# Patient Record
Sex: FEMALE | Race: BLACK OR AFRICAN AMERICAN | HISPANIC OR LATINO | Employment: PART TIME | URBAN - METROPOLITAN AREA
[De-identification: names, ages, dates, MRNs, and addresses within clinical notes are randomized per-mention and may not be internally consistent; named-entity substitution may affect disease eponyms.]

---

## 2017-01-04 ENCOUNTER — TELEPHONE (OUTPATIENT)
Dept: PEDIATRICS | Facility: CLINIC | Age: 22
End: 2017-01-04

## 2017-01-04 NOTE — TELEPHONE ENCOUNTER
Reason for Call:  Other - Call Back    Detailed comments: Patient called and stated she does not want an appointment but she wants to know if Dr. Millan could see her today for a moment to discuss something they discussed at her last visit. Patient prefers if Dr. Millan calls her back to discuss.     Phone Number Patient can be reached at: Cell number on file:    Telephone Information:   Mobile [phone]       Best Time: Anytime    Can we leave a detailed message on this number? YES    Call taken on 1/4/2017 at 12:48 PM by Mi Cueto

## 2017-03-21 ENCOUNTER — TELEPHONE (OUTPATIENT)
Dept: NURSING | Facility: CLINIC | Age: 22
End: 2017-03-21

## 2017-03-22 NOTE — TELEPHONE ENCOUNTER
Call Type: Triage Call    Presenting Problem: Patient c/o urinary symptoms, says she has  urgency but then is just able to go a little bit. Denies burning or  hematuria. No back or flank pain.  Triage Note:  Guideline Title: Urinary Symptoms - Female  Recommended Disposition: See Provider within 24 hours  Original Inclination: Wanted to speak with a nurse  Override Disposition:  Intended Action: See /Dmitriy Appt  Physician Contacted: No  Has one or more urinary tract symptoms AND has not been previously evaluated ?  YES  Blood in urine ? NO  Abnormal vaginal discharge (such as increased quantity, abnormal color,  consistency, odor) ? NO  Flank pain ? NO  New or worsening signs and symptoms that may indicate shock ? NO  Any temperature elevation in a frail elderly, immunocompromised or pregnant person  ? NO  Unbearable abdominal/pelvic pain ? NO  Current or recent urinary tract instrumentation AND urinary tract symptoms OR no  urine flow ? NO  Urinary tract symptoms AND any flank or low back pain ? NO  Urinary tract symptoms AND fever 101.5 F (38.6 C) or higher or vomiting ? NO  No urination for 12 or more hours ? NO  Any other unexpected urinary symptoms following urinary tract or abdominal surgery  within timeframe specified by provider ? NO  Physician Instructions:  Care Advice: Tell provider medical history of renal disease  especially if have only one kidney.  Call provider if you develop flank or low back pain, fever, generally feel  sick.  CAUTIONS  SYMPTOM / CONDITION MANAGEMENT  Call provider if urine is pink, red, smoky or cola colored.  Systemic Inflammatory Response Syndrome (SIRS):   Watch for signs of a  generalized, whole body infection. Occurs within days of a localized  infection, especially of the urinary, GI, respiratory or nervous systems  or after a traumatic injury or invasive procedure.   - Call  if  symptoms have worsened, such as increasing confusion or unusual drowsiness  cold and  clammy skin  no urine output  rapid respiration (>30/min.) or slow respiration (<10/min.)  struggling to breathe.   - Go to the ED immediately for early symptoms of  rapid pulse >90/min. or rapid breathing >20/min. at rest  chills  oral temperature >100.4 F (38 C) or <96.8 F (36 C) when associated with  conditions noted.  Limit carbonated, alcoholic, and caffeinated beverages such as coffee, tea  and soda.  Avoid nonprescription cold and allergy medications that contain  caffeine.  Limit intake of tomatoes, fruit juices (except for unsweetened  cranberry juice), dairy products, spicy foods, sugar, and artificial  sweeteners (aspartame or saccharine).  Stop or decrease smoking.  Reducing  exposure to bladder irritants may help lessen urgency.  Increase intake of fluids. Try to drink 8 oz. (.2 liter) every hour when  awake, unless on restricted fluids for other medical reasons. Include at  least two 8 oz. (.2 liter) glasses of unsweetened cranberry juice each day.  Take sips of fluid or eat ice chips if nauseated or vomiting.  Tell your provider if you are taking warfarin, Coumadin, Pradaxa, Xarelto,  or any other blood thinner and drinking cranberry juice or taking cranberry  capsules.  Analgesic/Antipyretic Advice - NSAIDs: Consider aspirin, ibuprofen,  naproxen or ketoprofen for pain or fever as directed on label or by  pharmacist/provider. PRECAUTIONS: - You should not take this medicine for  more than 10 days unless recommended by your provider. EXCEPTIONS: - Should  not be used if taking blood thinners or have bleeding problems. - Do not  use if have history of sensitivity/allergy to any of these medications  or history of cardiovascular, ulcer, kidney, liver disease or diabetes  unless approved by provider. - Do not exceed recommended dose or frequency.  Analgesic/Antipyretic Advice - Acetaminophen: Consider acetaminophen as  directed on label or by pharmacist/provider for pain or fever. PRECAUTIONS:  - Use if  there is no history of liver disease, alcoholism, or intake of  three or more alcohol drinks per day - Only if approved by provider during  pregnancy or when breastfeeding - Do not exceed recommended dose or  frequency. Do not take more than 3000 milligrams (mg) in 24 hours. Do not  take this medicine for more than 10 days unless recommended by your  provider. - During pregnancy, acetaminophen should not be taken more than 3  consecutive days without telling provider - To make sure you don't take too  much, check other medicines you take to see if they also contain  acetaminophen.

## 2017-03-28 DIAGNOSIS — F90.0 ATTENTION DEFICIT HYPERACTIVITY DISORDER (ADHD), PREDOMINANTLY INATTENTIVE TYPE: ICD-10-CM

## 2017-03-28 RX ORDER — DEXTROAMPHETAMINE SACCHARATE, AMPHETAMINE ASPARTATE MONOHYDRATE, DEXTROAMPHETAMINE SULFATE AND AMPHETAMINE SULFATE 5; 5; 5; 5 MG/1; MG/1; MG/1; MG/1
20 CAPSULE, EXTENDED RELEASE ORAL DAILY
Qty: 30 CAPSULE | Refills: 0 | Status: CANCELLED | OUTPATIENT
Start: 2017-03-28

## 2017-03-28 RX ORDER — DEXTROAMPHETAMINE SACCHARATE, AMPHETAMINE ASPARTATE MONOHYDRATE, DEXTROAMPHETAMINE SULFATE AND AMPHETAMINE SULFATE 5; 5; 5; 5 MG/1; MG/1; MG/1; MG/1
20 CAPSULE, EXTENDED RELEASE ORAL DAILY
Qty: 30 CAPSULE | Refills: 0 | Status: SHIPPED | OUTPATIENT
Start: 2017-03-28 | End: 2017-08-14

## 2017-03-28 NOTE — TELEPHONE ENCOUNTER
Received fax from [facility] asking for refill of adderall.  It does not state if Rx should be mailed to pharmacy or mom to .  Coleen Huggins RN            Last visit date 12-21-16:  ASSESSMENT/PLAN:      1. Attention deficit hyperactivity disorder (ADHD), predominantly inattentive type  Will renew Rx for medication at current dose. Patient will call one week before running out of medication so that we can send Rx to pharmacy.  - amphetamine-dextroamphetamine (ADDERALL XR) 20 MG per 24 hr capsule; Take 1 capsule (20 mg) by mouth daily Dispense: 30 capsule; Refill: 0     2. MAGDALENA (generalized anxiety disorder)  Will continue patient on current dose of escitaopram (5 mg po q D).      Spent 25 minutes in face-to-face health counseling regarding anxiety and stress reduction. Total visit time: 40 minutes.         3. Screen for STD (sexually transmitted disease)  Last sexual activity was in February of this year. No symptoms. Will do screen for GC/ chlamydia.     - NEISSERIA GONORRHOEA PCR  - CHLAMYDIA TRACHOMATIS PCR     FOLLOW UP: If not improving or if worsening     Jose uLis Millan MD

## 2017-03-28 NOTE — TELEPHONE ENCOUNTER
Rx: amphetamine-dextroamphetamine (ADDERALL XR) 20 MG per 24 hr capsule has been placed at the  for pick-up. LVM for mother.Beatrice Cannon,

## 2017-03-29 ENCOUNTER — OFFICE VISIT (OUTPATIENT)
Dept: PEDIATRICS | Facility: CLINIC | Age: 22
End: 2017-03-29
Payer: COMMERCIAL

## 2017-03-29 VITALS
DIASTOLIC BLOOD PRESSURE: 70 MMHG | HEART RATE: 64 BPM | SYSTOLIC BLOOD PRESSURE: 120 MMHG | HEIGHT: 65 IN | BODY MASS INDEX: 42.22 KG/M2 | TEMPERATURE: 97.5 F | WEIGHT: 253.4 LBS

## 2017-03-29 DIAGNOSIS — Z65.8 PSYCHOSOCIAL STRESSORS: ICD-10-CM

## 2017-03-29 DIAGNOSIS — Z11.3 SCREEN FOR STD (SEXUALLY TRANSMITTED DISEASE): Primary | ICD-10-CM

## 2017-03-29 DIAGNOSIS — F90.0 ATTENTION DEFICIT HYPERACTIVITY DISORDER (ADHD), PREDOMINANTLY INATTENTIVE TYPE: ICD-10-CM

## 2017-03-29 LAB
ALBUMIN UR-MCNC: 30 MG/DL
APPEARANCE UR: CLEAR
BILIRUB UR QL STRIP: NEGATIVE
COLOR UR AUTO: YELLOW
GLUCOSE UR STRIP-MCNC: NEGATIVE MG/DL
HGB UR QL STRIP: ABNORMAL
KETONES UR STRIP-MCNC: NEGATIVE MG/DL
LEUKOCYTE ESTERASE UR QL STRIP: NEGATIVE
MICRO REPORT STATUS: ABNORMAL
NITRATE UR QL: NEGATIVE
NON-SQ EPI CELLS #/AREA URNS LPF: ABNORMAL /LPF
PH UR STRIP: 5.5 PH (ref 5–7)
RBC #/AREA URNS AUTO: >100 /HPF (ref 0–2)
SP GR UR STRIP: >1.03 (ref 1–1.03)
SPECIMEN SOURCE: ABNORMAL
URN SPEC COLLECT METH UR: ABNORMAL
UROBILINOGEN UR STRIP-ACNC: 0.2 EU/DL (ref 0.2–1)
WBC #/AREA URNS AUTO: ABNORMAL /HPF (ref 0–2)
WET PREP SPEC: ABNORMAL

## 2017-03-29 PROCEDURE — 87210 SMEAR WET MOUNT SALINE/INK: CPT | Performed by: PEDIATRICS

## 2017-03-29 PROCEDURE — 99215 OFFICE O/P EST HI 40 MIN: CPT | Performed by: PEDIATRICS

## 2017-03-29 PROCEDURE — 87491 CHLMYD TRACH DNA AMP PROBE: CPT | Performed by: PEDIATRICS

## 2017-03-29 PROCEDURE — 81001 URINALYSIS AUTO W/SCOPE: CPT | Performed by: PEDIATRICS

## 2017-03-29 PROCEDURE — 87591 N.GONORRHOEAE DNA AMP PROB: CPT | Performed by: PEDIATRICS

## 2017-03-29 RX ORDER — DEXTROAMPHETAMINE SACCHARATE, AMPHETAMINE ASPARTATE MONOHYDRATE, DEXTROAMPHETAMINE SULFATE AND AMPHETAMINE SULFATE 5; 5; 5; 5 MG/1; MG/1; MG/1; MG/1
20 CAPSULE, EXTENDED RELEASE ORAL DAILY
Qty: 30 CAPSULE | Refills: 0 | Status: SHIPPED | OUTPATIENT
Start: 2017-03-29 | End: 2017-04-28

## 2017-03-29 RX ORDER — DEXTROAMPHETAMINE SACCHARATE, AMPHETAMINE ASPARTATE MONOHYDRATE, DEXTROAMPHETAMINE SULFATE AND AMPHETAMINE SULFATE 5; 5; 5; 5 MG/1; MG/1; MG/1; MG/1
20 CAPSULE, EXTENDED RELEASE ORAL DAILY
Qty: 30 CAPSULE | Refills: 0 | Status: SHIPPED | OUTPATIENT
Start: 2017-04-29 | End: 2017-05-29

## 2017-03-29 RX ORDER — DEXTROAMPHETAMINE SACCHARATE, AMPHETAMINE ASPARTATE MONOHYDRATE, DEXTROAMPHETAMINE SULFATE AND AMPHETAMINE SULFATE 5; 5; 5; 5 MG/1; MG/1; MG/1; MG/1
20 CAPSULE, EXTENDED RELEASE ORAL DAILY
Qty: 30 CAPSULE | Refills: 0 | Status: SHIPPED | OUTPATIENT
Start: 2017-05-30 | End: 2017-06-29

## 2017-03-29 NOTE — PROGRESS NOTES
SUBJECTIVE:                                                    Juanita Gonzalez is a 21 year old female who presents to clinic today with self because of:    Chief Complaint   Patient presents with     A.D.H.D     medication follow up     UTI     UTI concern x4 day ago, frequency urinating.         HPI:  ADHD Follow-Up  Date of last ADHD office visit: 12/21/16  Status since last visit: Improving, more discipline with homework  Taking controlled (daily) medications as prescribed: Yes           Concerns UTI concern, frequent feeling of urgency, but then unable to urinate. Started on 3/21, 2 days later began menses and symptoms stopped. Currently menstrating. No vaginal discharge, back pain, flank pain.                                                                ADHD Medication     Amphetamines Disp Start End    amphetamine-dextroamphetamine (ADDERALL XR) 20 MG per 24 hr capsule 30 capsule 3/28/2017     Sig - Route: Take 1 capsule (20 mg) by mouth daily - Oral    Class: [employer]        School:  Name of SCHOOL: [employer]  Grade: 2nd YEAR, considering further education/training after finishing certificate this summer  School Concerns/Teacher Feedback: Worse  School services/Modifications: none  Homework: Worse, behind of projects  Grades: Worse, 3 months behind on projects, had to start classes late because of [employer] application problems    Sleep: no problems, going to bed earlier, usually at 10pm, 1am at latest  Home/Family Concerns: Stable  Peer Concerns: Stable    Co-Morbid Diagnosis: Marijuana use, anxiety  Currently in counseling: No    Medication Benefits:   Controlled symptoms: Attention span, Distractability and Accepting limits  Uncontrolled symptoms: School failure    Medication side effects:  Parent/Patient Concerns with Medications: None  Denies: appetite suppression, weight loss, insomnia, palpitations, stomach ache and headache    Psychosocial History:  Home: lives at home with family, stressful  relationship with mother; difficulty paying bills, trying to get a better job or second job to move to own place by end of year  Education: as above  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 Part of her daily habit now. Drinks alcohol a few times per month to get drunk. Has ridden in car with drunk  in past, feels bad about this, now has plans to stay at the place where they are drinking.  Sexual activity: Not currently sexually active, but in past. Most recent about 6 mo ago.  Suicide and mood: Anxiety about financial stress and school especially, cries often. Denies suicidal ideation  Safety: Discussed driving with drunk drivers as above    ROS:  Negative for constitutional, eye, ear, nose, throat, skin, respiratory, cardiac, and gastrointestinal other than those outlined in the HPI.    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  Has been seen by Dr. Toni Whitten in peds dermatology.         Seasonal allergies 05/15/2011     Managed with loratadine         Acanthosis nigricans 11/04/2009     Has had a glucose tolerance test with Endocrinology.       Disturbance in sleep behavior 09/12/2007     Delayed sleep phase, negatve PSG for apnea 9/2007.  Takes melatonin  "which helps.  Chronic sleep deprivation but able to have good sleep onset if she allows herself to stop activities at a set bedtime.    Problem list name updated by automated process. Provider to review       Obesity 2006     Normal Lipid panel 2010.  Should be rescreened every 3-5 years.  Glucose was normal but fasting insulin was borderline.  Should repeated every 1-2 years.    Problem list name updated by automated process. Provider to review       Attention deficit hyperactivity disorder (ADHD) 2006     Followed by Dr. Clement  On Concerta.  Problem list name updated by automated process. Provider to review        MEDICATIONS:  Current Outpatient Prescriptions   Medication Sig Dispense Refill     amphetamine-dextroamphetamine (ADDERALL XR) 20 MG per 24 hr capsule Take 1 capsule (20 mg) by mouth daily 30 capsule 0     drospirenone-ethinyl estradiol (ARELIS 28) 3-0.03 MG per tablet Take 1 tablet by mouth daily (Patient not taking: Reported on 3/29/2017) 90 tablet 3      ALLERGIES:  Allergies   Allergen Reactions     Bactrim Hives       Problem list and histories reviewed & adjusted, as indicated.    OBJECTIVE:                                                    /70 (BP Location: Left arm, Patient Position: Chair)  Temp 97.5  F (36.4  C) (Oral)  Ht 5' 4.76\" (1.645 m)  Wt 253 lb 6.4 oz (114.9 kg)  LMP 2017  BMI 42.48 kg/m2   Blood pressure percentiles are 83 % systolic and 71 % diastolic based on NHBPEP's 4th Report. Blood pressure percentile targets: 90: 123/78, 95: 127/82, 99 + 5 mmH/94.    GENERAL:  Alert and interactive., EYES:  Normal extra-ocular movements.  PERRLA, LUNGS:  Clear, HEART:  Normal rate and rhythm.  Normal S1 and S2.  No murmurs., ABDOMEN:  Soft, non-tender, no organomegaly. and NEURO:  No tics or tremor.  Normal tone and strength. Normal gait and balance.     DIAGNOSTICS:   Results for orders placed or performed in visit on 17 (from the past 24 " hour(s))   Wet prep   Result Value Ref Range    Specimen Description Vagina     Wet Prep No Trichomonas seen No yeast seen Clue cells seen (A)     Micro Report Status FINAL 03/29/2017    UA with Microscopic reflex to Culture   Result Value Ref Range    Color Urine Yellow     Appearance Urine Clear     Glucose Urine Negative NEG mg/dL    Bilirubin Urine Negative NEG    Ketones Urine Negative NEG mg/dL    Specific Gravity Urine >1.030 1.003 - 1.035    pH Urine 5.5 5.0 - 7.0 pH    Protein Albumin Urine 30 (A) NEG mg/dL    Urobilinogen Urine 0.2 0.2 - 1.0 EU/dL    Nitrite Urine Negative NEG    Blood Urine Large (A) NEG    Leukocyte Esterase Urine Negative NEG    Source Midstream Urine     WBC Urine O - 2 0 - 2 /HPF    RBC Urine >100 (A) 0 - 2 /HPF    Squamous Epithelial /LPF Urine Few FEW /LPF       ASSESSMENT/PLAN:                                                      1. Screen for STD (sexually transmitted disease)    2. Attention deficit hyperactivity disorder (ADHD), predominantly inattentive type    3. Psychosocial stressors      ADHD--inattentive only  ADHD Medications:   Adderall XR 20 mg in the morning     No change in medication. Continue on current Rx.  Goal for measurement at Follow-up (specific criteria): Homework and Improvements in Grades    Due to persistent anxiety and marijuana use, will plan for close follow-up at Juanita's earliest convienence to talk about strategies for improving anxiety. Juanita expressed concern about the amount of marijuana she using and appears to be in the contemplation vs action phase of cutting back of stopping use. It is also possible that her substance use and uncontrolled anxiety could be playing a significant role in her inattention.    Juanita has significant social stressors including difficulties with finances, financial aide for school that interferred with her ability to register for classes and is now behind in school. I will place a referral for Social Work. Initially  "Juanita was hesitant as she didn't want to \"burden the system\" as someone who \"should learn to do it on her own,\" but with reassurance that she would be an excellent candidate for some extra short-term help, she agreed.    With her history of urinary urgency and hesitancy and past sexual activity, obtained UA, vaginal self swab for Gonorrhea and Chlamydia and wet prep. UA without signs of UTI. STD testing pending.    FOLLOW UP: 3 mo for ADHD, earliest convenience for anxiety, and SW to contact to set up appt    Dong Bird MD MA  Pediatrics, PL-2  Pager [phone]    Patient seen and discussed with Dr. Millan.    Attending:  Patient seen, examined and discussed with resident.  Agree with above assessment and plan.  Spent over 50% of the visit in face-to face counseling.  Total visit time: 40 minutes.      Jose Luis Millan MD      "

## 2017-03-29 NOTE — PROGRESS NOTES
SUBJECTIVE:                                                    Juanita Gonzalez is a 21 year old female who presents to clinic today with self because of:    Chief Complaint   Patient presents with     Recheck Medication     medication follow up     UTI     UTI concern x4 day ago, frequency urinating.         HPI:  General Follow Up    Concern:medication follow up. UTI concerns; frequency urinating.   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  Has been seen by Dr. Toni Whitten in peds dermatology.         Seasonal allergies 05/15/2011     Managed with loratadine         Acanthosis nigricans 11/04/2009     Has had a glucose tolerance test with Endocrinology.       Disturbance in sleep behavior 09/12/2007     Delayed sleep phase, negatve PSG for apnea 9/2007.  Takes melatonin which helps.  Chronic sleep deprivation but able to have good sleep onset if she allows herself to stop activities at a set bedtime.    Problem list name updated by automated process. Provider to review       Obesity 07/12/2006     Normal Lipid panel Feb, 2010.  Should be rescreened every 3-5 years.  Glucose was normal but fasting insulin was borderline.  Should repeated every  "1-2 years.    Problem list name updated by automated process. Provider to review       Attention deficit hyperactivity disorder (ADHD) 2006     Followed by Dr. Peña.  On Concerta.  Problem list name updated by automated process. Provider to review        MEDICATIONS:  Current Outpatient Prescriptions   Medication Sig Dispense Refill     amphetamine-dextroamphetamine (ADDERALL XR) 20 MG per 24 hr capsule Take 1 capsule (20 mg) by mouth daily 30 capsule 0     drospirenone-ethinyl estradiol (ARELIS 28) 3-0.03 MG per tablet Take 1 tablet by mouth daily (Patient not taking: Reported on 3/29/2017) 90 tablet 3      ALLERGIES:  Allergies   Allergen Reactions     Bactrim Hives       Problem list and histories reviewed & adjusted, as indicated.    OBJECTIVE:                                                    {Note vitals & weights}  /70 (BP Location: Left arm, Patient Position: Chair)  Temp 97.5  F (36.4  C) (Oral)  Ht 5' 4.76\" (1.645 m)  Wt 253 lb 6.4 oz (114.9 kg)  LMP 2017  BMI 42.48 kg/m2   Blood pressure percentiles are 83 % systolic and 71 % diastolic based on NHBPEP's 4th Report. Blood pressure percentile targets: 90: 123/78, 95: 127/82, 99 + 5 mmH/94.    {Exam choices:714103}    DIAGNOSTICS: {Diagnostics:809023::\"None\"}    ASSESSMENT/PLAN:                                                    {Diagnosis Options:744792}    FOLLOW UP: { :538741}    Jose Luis Millan MD    "

## 2017-03-29 NOTE — MR AVS SNAPSHOT
"              After Visit Summary   3/29/2017    Juanita Gonzalez    MRN: [id number]           Patient Information     Date Of Birth          1995        Visit Information        Provider Department      3/29/2017 2:00 PM Jose Luis Millan MD [facility]        Today's Diagnoses     Screen for STD (sexually transmitted disease)    -  1    Attention deficit hyperactivity disorder (ADHD), predominantly inattentive type           Follow-ups after your visit        Your next 10 appointments already scheduled     Apr 05, 2017  3:20 PM CDT   SHORT with Jose Luis Millan MD   [facility] ([facility]    [address] [place] [id number] [phone]              Who to contact     If you have questions or need follow up information about today's clinic visit or your schedule please contact [facility] directly at [phone].  Normal or non-critical lab and imaging results will be communicated to you by MyChart, letter or phone within 4 business days after the clinic has received the results. If you do not hear from us within 7 days, please contact the clinic through MyChart or phone. If you have a critical or abnormal lab result, we will notify you by phone as soon as possible.  Submit refill requests through Illumio or call your pharmacy and they will forward the refill request to us. Please allow 3 business days for your refill to be completed.          Additional Information About Your Visit        MyChart Information     Illumio lets you send messages to your doctor, view your test results, renew your prescriptions, schedule appointments and more. To sign up, go to [email or web address] . Click on \"Log in\" on the left side of the screen, which will take you to the Welcome page. Then click on \"Sign up Now\" on the right side of the page.     You will be asked to enter the access code " "listed below, as well as some personal information. Please follow the directions to create your username and password.     Your access code is: [email or web address]  Expires: 2017  3:35 PM     Your access code will  in 90 days. If you need help or a new code, please call your [place] clinic or [phone].        Care EveryWhere ID     This is your Care EveryWhere ID. This could be used by other organizations to access your [place] medical records  [id number]        Your Vitals Were     Pulse Temperature Height Last Period BMI (Body Mass Index)       64 97.5  F (36.4  C) (Oral) 5' 4.76\" (1.645 m) 2017 42.48 kg/m2        Blood Pressure from Last 3 Encounters:   17 120/70   16 122/80   16 116/81    Weight from Last 3 Encounters:   17 253 lb 6.4 oz (114.9 kg) (>99 %)*   16 249 lb 6.4 oz (113.1 kg) (>99 %)*   16 247 lb 9.6 oz (112.3 kg) (>99 %)*     * Growth percentiles are based on CDC 2-20 Years data.              We Performed the Following     Chlamydia trachomatis PCR     Neisseria gonorrhoeae PCR     UA with Microscopic reflex to Culture     Wet prep          Today's Medication Changes          These changes are accurate as of: 3/29/17  3:35 PM.  If you have any questions, ask your nurse or doctor.               These medicines have changed or have updated prescriptions.        Dose/Directions    * amphetamine-dextroamphetamine 20 MG per 24 hr capsule   Commonly known as:  ADDERALL XR   This may have changed:  Another medication with the same name was added. Make sure you understand how and when to take each.   Used for:  Attention deficit hyperactivity disorder (ADHD), predominantly inattentive type   Changed by:  Jose Luis Millan MD        Dose:  20 mg   Take 1 capsule (20 mg) by mouth daily   Quantity:  30 capsule   Refills:  0       * amphetamine-dextroamphetamine 20 MG per 24 hr capsule   Commonly known as:  ADDERALL XR   This may have changed:  You were already " taking a medication with the same name, and this prescription was added. Make sure you understand how and when to take each.   Used for:  Attention deficit hyperactivity disorder (ADHD), predominantly inattentive type   Changed by:  Jose Luis Millan MD        Dose:  20 mg   Take 1 capsule (20 mg) by mouth daily   Quantity:  30 capsule   Refills:  0       * amphetamine-dextroamphetamine 20 MG per 24 hr capsule   Commonly known as:  ADDERALL XR   This may have changed:  You were already taking a medication with the same name, and this prescription was added. Make sure you understand how and when to take each.   Used for:  Attention deficit hyperactivity disorder (ADHD), predominantly inattentive type   Changed by:  Jose Luis Millan MD        Dose:  20 mg   Start taking on:  4/29/2017   Take 1 capsule (20 mg) by mouth daily   Quantity:  30 capsule   Refills:  0       * amphetamine-dextroamphetamine 20 MG per 24 hr capsule   Commonly known as:  ADDERALL XR   This may have changed:  You were already taking a medication with the same name, and this prescription was added. Make sure you understand how and when to take each.   Used for:  Attention deficit hyperactivity disorder (ADHD), predominantly inattentive type   Changed by:  Jose Luis Millan MD        Dose:  20 mg   Start taking on:  5/30/2017   Take 1 capsule (20 mg) by mouth daily   Quantity:  30 capsule   Refills:  0       * Notice:  This list has 4 medication(s) that are the same as other medications prescribed for you. Read the directions carefully, and ask your doctor or other care provider to review them with you.         Where to get your medicines      Some of these will need a paper prescription and others can be bought over the counter.  Ask your nurse if you have questions.     Bring a paper prescription for each of these medications     amphetamine-dextroamphetamine 20 MG per 24 hr capsule    amphetamine-dextroamphetamine 20 MG per 24 hr capsule     amphetamine-dextroamphetamine 20 MG per 24 hr capsule                Primary Care Provider Office Phone # Fax #    Jose Luis Millan [phone]       [facility] [address] [place] [id number]        Thank you!     Thank you for choosing [facility]  for your care. Our goal is always to provide you with excellent care. Hearing back from our patients is one way we can continue to improve our services. Please take a few minutes to complete the written survey that you may receive in the mail after your visit with us. Thank you!             Your Updated Medication List - Protect others around you: Learn how to safely use, store and throw away your medicines at www.disposemymeds.org.          This list is accurate as of: 3/29/17  3:35 PM.  Always use your most recent med list.                   Brand Name Dispense Instructions for use    * amphetamine-dextroamphetamine 20 MG per 24 hr capsule    ADDERALL XR    30 capsule    Take 1 capsule (20 mg) by mouth daily       * amphetamine-dextroamphetamine 20 MG per 24 hr capsule    ADDERALL XR    30 capsule    Take 1 capsule (20 mg) by mouth daily       * amphetamine-dextroamphetamine 20 MG per 24 hr capsule   Start taking on:  4/29/2017    ADDERALL XR    30 capsule    Take 1 capsule (20 mg) by mouth daily       * amphetamine-dextroamphetamine 20 MG per 24 hr capsule   Start taking on:  5/30/2017    ADDERALL XR    30 capsule    Take 1 capsule (20 mg) by mouth daily       drospirenone-ethinyl estradiol 3-0.03 MG per tablet    ARELIS 28    90 tablet    Take 1 tablet by mouth daily       * Notice:  This list has 4 medication(s) that are the same as other medications prescribed for you. Read the directions carefully, and ask your doctor or other care provider to review them with you.

## 2017-03-30 LAB
C TRACH DNA SPEC QL NAA+PROBE: NORMAL
N GONORRHOEA DNA SPEC QL NAA+PROBE: NORMAL
SPECIMEN SOURCE: NORMAL
SPECIMEN SOURCE: NORMAL

## 2017-04-03 ENCOUNTER — CARE COORDINATION (OUTPATIENT)
Dept: CARE COORDINATION | Facility: CLINIC | Age: 22
End: 2017-04-03

## 2017-04-03 NOTE — PROGRESS NOTES
Clinic Care Coordination Contact  OUTREACH    Referral Information:  Referral Source: PCP  Reason for Contact:  contact patient to follow up on referral for help with financial planning, organization and moving out. Patient states she does not need help with this at this time and stated that PCP had recommended she see someone to talk about her anxiety, but that she doesn't feel she needs to do that at this time. Patient stated she would like to take down 's phone number and call in the future with questions.   Care Conference: No     Universal Utilization:   ED Visits in last year: 0  Hospital visits in last year: 0  Last PCP appointment: 03/29/17  Concerns: no  Multiple Providers or Specialists: no    Clinical Concerns:  Current Medical Concerns: none discussed.    Current Behavioral Concerns: Per PCP note, patient struggling with ADHD, anxiety and utilizing marijuana to help with anxiety. She will be following up with PCP regarding these issues.       Functional Status:  Mobility Status: Independent  Equipment Currently Used at Home: none  Transportation: drives      Psychosocial:  Current living arrangement:: I live in a private home with family  Financial/Insurance: Limited finances, currently in school. No medical insurance listed.      Resources and Interventions:  Current Resources: Other (Comments);    Advanced Care Plans/Directives on file:: No    Patient/Caregiver understanding: Patient stated she does not want to discuss anxiety issues at this time. She also does not feel she needs help with resources at this time, but stated she will call in the future as needed.   Frequency of Care Coordination: as needed-patient would like to call when she is ready  Upcoming appointment: 04/05/17     Plan:   Patient to continue to see PCP. She will call  with needs.    Lucinda Quigley [facility]  Social Work Care Coordinator  [facility]  Ph:  [phone]

## 2017-05-23 ENCOUNTER — OFFICE VISIT (OUTPATIENT)
Dept: PEDIATRICS | Facility: CLINIC | Age: 22
End: 2017-05-23

## 2017-05-23 VITALS
HEART RATE: 80 BPM | TEMPERATURE: 98.4 F | WEIGHT: 256.2 LBS | HEIGHT: 65 IN | SYSTOLIC BLOOD PRESSURE: 125 MMHG | DIASTOLIC BLOOD PRESSURE: 69 MMHG | BODY MASS INDEX: 42.69 KG/M2

## 2017-05-23 DIAGNOSIS — Z11.3 SCREEN FOR STD (SEXUALLY TRANSMITTED DISEASE): ICD-10-CM

## 2017-05-23 DIAGNOSIS — F90.0 ATTENTION DEFICIT HYPERACTIVITY DISORDER (ADHD), PREDOMINANTLY INATTENTIVE TYPE: Primary | ICD-10-CM

## 2017-05-23 DIAGNOSIS — F32.0 MAJOR DEPRESSIVE DISORDER, SINGLE EPISODE, MILD (H): ICD-10-CM

## 2017-05-23 DIAGNOSIS — E23.6 RATHKE'S CLEFT CYST (H): ICD-10-CM

## 2017-05-23 PROCEDURE — 99215 OFFICE O/P EST HI 40 MIN: CPT | Performed by: PEDIATRICS

## 2017-05-23 PROCEDURE — 87591 N.GONORRHOEAE DNA AMP PROB: CPT | Performed by: PEDIATRICS

## 2017-05-23 PROCEDURE — 87491 CHLMYD TRACH DNA AMP PROBE: CPT | Performed by: PEDIATRICS

## 2017-05-23 NOTE — PROGRESS NOTES
SUBJECTIVE:                                                    Juanita Gonzalez is a 21 year old female who presents to clinic today with self because of:    Chief Complaint   Patient presents with     RECHECK     ADHD        HPI:  ADHD Follow-Up    Date of last ADHD office visit: 12/21/16  Status since last visit: Improving  Taking controlled (daily) medications as prescribed: Yes                                                                           ADHD Medication     Amphetamines Disp Start End    amphetamine-dextroamphetamine (ADDERALL XR) 20 MG per 24 hr capsule 30 capsule 3/28/2017     Sig - Route: Take 1 capsule (20 mg) by mouth daily - Oral    Class: Local Print    amphetamine-dextroamphetamine (ADDERALL XR) 20 MG per 24 hr capsule 30 capsule 4/29/2017 5/29/2017    Sig - Route: Take 1 capsule (20 mg) by mouth daily - Oral    Patient not taking:  Reported on 5/23/2017       Class: Local Print    amphetamine-dextroamphetamine (ADDERALL XR) 20 MG per 24 hr capsule 30 capsule 5/30/2017 6/29/2017    Sig - Route: Take 1 capsule (20 mg) by mouth daily - Oral    Patient not taking:  Reported on 5/23/2017       Class: Local Print          School:  Name of SCHOOL: [employer], doing 2-year certificate in Herbal medicine.  Passed all her classes except for one, which she needs to redo this summer.  Grade: 2nd   School Concerns/Teacher Feedback: Stable  School services/Modifications: none  Homework: Stable  Grades: Stable    Sleep: no problems  Home/Family Concerns: Improving  Peer Concerns: None    Co-Morbid Diagnosis: Depression, resolved.  Doing very well.  PHQ-2 is 0.    Currently in counseling: No      Medication Benefits:   Controlled symptoms: Attention span, Distractability and Finishing tasks  Uncontrolled symptoms: None    Medication side effects:  Parent/Patient Concerns with Medications: None  Denies: appetite suppression, weight loss, insomnia, tics, palpitations, stomach ache, headache, emotional  "lability, rebound irritability, drowsiness, \"zombie\" effect, growth suppression and dry mouth    Other:    2)  Would like screen for STD.  Had new exposure a week ago.  Used condom, but wants to be sure she didn't  an infection.  LMP was three weeks ago.      3) Rathke's cleft cyst.  Incidental benign finding on MRI of head.  No further work-up needed.    4)  Mild major depressive disorder.  In remission.  Doing well.  On no medication for this.  Is not in therapy.  PHQ-2 is 0 today.        ROS:  GENERAL: Fever - no; Poor appetite - no; Sleep disruption - no  SKIN: Rash - No; Hives - No; Eczema - No;  EYE: Pain - No; Discharge - No; Redness - No; Itching - No; Vision Problems - No;  ENT: Ear Pain - No; Runny nose - No; Congestion - No; Sore Throat - No;  RESP: Cough - No; Wheezing - No; Difficulty Breathing - No;  GI: Vomiting - No; Diarrhea - No; Abdominal Pain - No; Constipation - No;  NEURO: Headache - No; Weakness - No;      PROBLEM LIST:  Patient Active Problem List    Diagnosis Date Noted     MAGDALENA (generalized anxiety disorder) 12/21/2016     Priority: Medium     Rathke's cleft cyst (H) 03/09/2015     Priority: Medium     Osteoma 05/03/2015     Café au lait spot 05/03/2015     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  Has been seen by Dr. Toni Whitten in peds dermatology.         Seasonal allergies 05/15/2011     Managed with loratadine         Acanthosis nigricans 11/04/2009     Has had a glucose tolerance test with Endocrinology.       Disturbance in sleep behavior 09/12/2007     Delayed sleep phase, negatve PSG for apnea 9/2007.  Takes melatonin which helps.  Chronic sleep " "deprivation but able to have good sleep onset if she allows herself to stop activities at a set bedtime.    Problem list name updated by automated process. Provider to review       Obesity 2006     Normal Lipid panel 2010.  Should be rescreened every 3-5 years.  Glucose was normal but fasting insulin was borderline.  Should repeated every 1-2 years.    Problem list name updated by automated process. Provider to review       Attention deficit hyperactivity disorder (ADHD) 2006     Followed by Dr. Clement  On Concerta.  Problem list name updated by automated process. Provider to review        MEDICATIONS:  Current Outpatient Prescriptions   Medication Sig Dispense Refill     amphetamine-dextroamphetamine (ADDERALL XR) 20 MG per 24 hr capsule Take 1 capsule (20 mg) by mouth daily 30 capsule 0     amphetamine-dextroamphetamine (ADDERALL XR) 20 MG per 24 hr capsule Take 1 capsule (20 mg) by mouth daily (Patient not taking: Reported on 2017) 30 capsule 0     [START ON 2017] amphetamine-dextroamphetamine (ADDERALL XR) 20 MG per 24 hr capsule Take 1 capsule (20 mg) by mouth daily (Patient not taking: Reported on 2017) 30 capsule 0     drospirenone-ethinyl estradiol (ARELIS 28) 3-0.03 MG per tablet Take 1 tablet by mouth daily (Patient not taking: Reported on 2017) 90 tablet 3      ALLERGIES:  Allergies   Allergen Reactions     Bactrim Hives       Problem list and histories reviewed & adjusted, as indicated.    OBJECTIVE:                                                        /69 (BP Location: Left arm, Patient Position: Chair, Cuff Size: Adult Large)  Pulse 80  Temp 98.4  F (36.9  C) (Oral)  Ht 5' 4.57\" (1.64 m)  Wt 256 lb 3.2 oz (116.2 kg)  BMI 43.21 kg/m2   Blood pressure percentiles are 93 % systolic and 70 % diastolic based on NHBPEP's 4th Report. Blood pressure percentile targets: 90: 123/77, 95: 127/81, 99 + 5 mmH/94.    GENERAL: Active, alert, in no acute " distress.  SKIN: Clear. No significant rash, abnormal pigmentation or lesions  HEAD: Normocephalic.  EYES:  No discharge or erythema. Normal pupils and EOM.  EARS: Normal canals. Tympanic membranes are normal; gray and translucent.  NOSE: Normal without discharge.  MOUTH/THROAT: Clear. No oral lesions. Teeth intact without obvious abnormalities.  NECK: Supple, no masses.  LYMPH NODES: No adenopathy  LUNGS: Clear. No rales, rhonchi, wheezing or retractions  HEART: Regular rhythm. Normal S1/S2. No murmurs.  ABDOMEN: Soft, non-tender, not distended, no masses or hepatosplenomegaly. Bowel sounds normal.     DIAGNOSTICS: None    ASSESSMENT/PLAN:                                                    1. Attention deficit hyperactivity disorder (ADHD), predominantly inattentive type  Will continue on current medication.  Has not filled recent Rx because of change in insurance.  Will be able to refill medication in the next week or so.  Is out of school right now, and so not concerned about being off medication.  Will be doing summer school, starting in August.    2. Screen for STD (sexually transmitted disease)  No obvious symptoms, but new exposure last week.  Will send the following.  - Neisseria gonorrhoeae PCR  - Chlamydia trachomatis PCR    3. Rathke's cleft cyst (H)  Incidental finding on MRI of head in the past. No further evaluation needed.      4. Major depressive disorder, single episode, mild (H)  In remission.  PHQ-2 is 0 today.    Spent over 50% in face-to-face health counseling.  Total visit time: 40  minutes.  Follow up in 3 months, or sooner as needed.    FOLLOW UP: in 3 month(s)    Jose Luis Millan MD

## 2017-05-23 NOTE — MR AVS SNAPSHOT
"              After Visit Summary   2017    Juanita Gonzalez    MRN: [id number]           Patient Information     Date Of Birth          1995        Visit Information        Provider Department      2017 2:20 PM Jose Luis Millan MD [facility]         Follow-ups after your visit        Who to contact     If you have questions or need follow up information about today's clinic visit or your schedule please contact [facility] directly at [phone].  Normal or non-critical lab and imaging results will be communicated to you by MyChart, letter or phone within 4 business days after the clinic has received the results. If you do not hear from us within 7 days, please contact the clinic through [email or web address] or phone. If you have a critical or abnormal lab result, we will notify you by phone as soon as possible.  Submit refill requests through TrueLens or call your pharmacy and they will forward the refill request to us. Please allow 3 business days for your refill to be completed.          Additional Information About Your Visit        [email or web address] Information     TrueLens lets you send messages to your doctor, view your test results, renew your prescriptions, schedule appointments and more. To sign up, go to [email or web address] . Click on \"Log in\" on the left side of the screen, which will take you to the Welcome page. Then click on \"Sign up Now\" on the right side of the page.     You will be asked to enter the access code listed below, as well as some personal information. Please follow the directions to create your username and password.     Your access code is: [email or web address]  Expires: 2017  3:35 PM     Your access code will  in 90 days. If you need help or a new code, please call your [facility] or [phone].        Care EveryWhere ID     This is your Care EveryWhere ID. This could be used by other organizations to access your [place] " "medical records  [id number]        Your Vitals Were     Pulse Temperature Height BMI (Body Mass Index)          80 98.4  F (36.9  C) (Oral) 5' 4.57\" (1.64 m) 43.21 kg/m2         Blood Pressure from Last 3 Encounters:   05/23/17 125/69   04/05/17 120/70   03/29/17 120/70    Weight from Last 3 Encounters:   05/23/17 256 lb 3.2 oz (116.2 kg) (>99 %)*   04/05/17 255 lb 6.4 oz (115.8 kg) (>99 %)*   03/29/17 253 lb 6.4 oz (114.9 kg) (>99 %)*     * Growth percentiles are based on [facility] 2-20 Years data.              Today, you had the following     No orders found for display       Primary Care Provider Office Phone # Fax #    Jose Luis Millan [phone]       [facility] [address] [place] [address]  [facility] [id number]        Thank you!     Thank you for choosing [facility]  for your care. Our goal is always to provide you with excellent care. Hearing back from our patients is one way we can continue to improve our services. Please take a few minutes to complete the written survey that you may receive in the mail after your visit with us. Thank you!             Your Updated Medication List - Protect others around you: Learn how to safely use, store and throw away your medicines at www.disposemymeds.org.          This list is accurate as of: 5/23/17  2:45 PM.  Always use your most recent med list.                   Brand Name Dispense Instructions for use    * amphetamine-dextroamphetamine 20 MG per 24 hr capsule    ADDERALL XR    30 capsule    Take 1 capsule (20 mg) by mouth daily       * amphetamine-dextroamphetamine 20 MG per 24 hr capsule    ADDERALL XR    30 capsule    Take 1 capsule (20 mg) by mouth daily       * amphetamine-dextroamphetamine 20 MG per 24 hr capsule   Start taking on:  5/30/2017    ADDERALL XR    30 capsule    Take 1 capsule (20 mg) by mouth daily       drospirenone-ethinyl estradiol 3-0.03 MG per tablet    ARELIS 28    90 tablet    Take 1 tablet by " mouth daily       * Notice:  This list has 3 medication(s) that are the same as other medications prescribed for you. Read the directions carefully, and ask your doctor or other care provider to review them with you.

## 2017-05-23 NOTE — NURSING NOTE
"Chief Complaint   Patient presents with     RECHECK     ADHD       Initial /69 (BP Location: Left arm, Patient Position: Chair, Cuff Size: Adult Large)  Pulse 80  Temp 98.4  F (36.9  C) (Oral)  Ht 5' 4.57\" (1.64 m)  Wt 256 lb 3.2 oz (116.2 kg)  BMI 43.21 kg/m2 Estimated body mass index is 43.21 kg/(m^2) as calculated from the following:    Height as of this encounter: 5' 4.57\" (1.64 m).    Weight as of this encounter: 256 lb 3.2 oz (116.2 kg).  Medication Reconciliation: complete   Liza Dee      "

## 2017-05-30 ENCOUNTER — OFFICE VISIT (OUTPATIENT)
Dept: DERMATOLOGY | Facility: CLINIC | Age: 22
End: 2017-05-30
Attending: DERMATOLOGY

## 2017-05-30 DIAGNOSIS — L81.0 POST-INFLAMMATORY HYPERPIGMENTATION: Primary | ICD-10-CM

## 2017-05-30 DIAGNOSIS — L70.0 ACNE VULGARIS: ICD-10-CM

## 2017-05-30 PROCEDURE — 99212 OFFICE O/P EST SF 10 MIN: CPT | Mod: ZF

## 2017-05-30 RX ORDER — DROSPIRENONE AND ETHINYL ESTRADIOL 0.03MG-3MG
1 KIT ORAL DAILY
Qty: 84 TABLET | Refills: 3 | Status: SHIPPED | OUTPATIENT
Start: 2017-05-30 | End: 2017-08-30

## 2017-05-30 NOTE — NURSING NOTE
"Chief Complaint   Patient presents with     RECHECK     follow up Acne, and medication refill        Initial There were no vitals taken for this visit. Estimated body mass index is 43.21 kg/(m^2) as calculated from the following:    Height as of 5/23/17: 5' 4.57\" (164 cm).    Weight as of 5/23/17: 256 lb 3.2 oz (116.2 kg).  Medication Reconciliation: complete   Monica Feng LPN      "

## 2017-05-30 NOTE — PATIENT INSTRUCTIONS
[facility]- Pediatric Dermatology  Dr. Grace Whitten, Dr. Kristine Garrido, Dr. Luis Miguel Joy, Dr. Marisela Estes, Dr. Jimbo Lopez       Pediatric Appointment Scheduling and Call Center [phone]     Non Urgent -Triage Voicemail Line; [phone]- [place] and [place] RN's. Messages are checked periodically throughout the day and are returned as soon as possible.      Clinic Fax number: [phone]    If you need a prescription refill, please contact your pharmacy. They will send us an electronic request. Refills are approved or denied by our Physicians during normal business hours, Monday through Fridays    Per office policy, refills will not be granted if you have not been seen within the past year (or sooner depending on your child's condition)    *Radiology Scheduling- [phone]  *Sedation Unit Scheduling- [phone]  *[place] Scheduling- General [phone]; Pediatric Dermatology [phone]  *Main  Services: [phone]   Pakistani: [phone]   Turkmen: [phone]   Hmong/Turkish/Alfonso: [phone]    For urgent matters that cannot wait until the next business day, is over a holiday and/or a weekend please call [phone] and ask for the Dermatology Resident On-Call to be paged.

## 2017-05-30 NOTE — MR AVS SNAPSHOT
After Visit Summary   5/30/2017    Juanita Gonzalez    MRN: [id number]           Patient Information     Date Of Birth          1995        Visit Information        Provider Department      5/30/2017 3:15 PM Grace Whitten MD Peds Dermatology        Today's Diagnoses     Post-inflammatory hyperpigmentation    -  1    Acne vulgaris          Care Instructions    [facility]- Pediatric Dermatology  Dr. Grace Whitten, Dr. Kristine Garrido, Dr. Luis Miguel Joy, Dr. Marisela Estes, Dr. Jimbo Lopez       Pediatric Appointment Scheduling and Call Center [phone]     Non Urgent -Triage Voicemail Line; [phone]- [place] and [place] RN's. Messages are checked periodically throughout the day and are returned as soon as possible.      Clinic Fax number: [phone]    If you need a prescription refill, please contact your pharmacy. They will send us an electronic request. Refills are approved or denied by our Physicians during normal business hours, Monday through Fridays    Per office policy, refills will not be granted if you have not been seen within the past year (or sooner depending on your child's condition)    *Radiology Scheduling- [phone]  *Sedation Unit Scheduling- [phone]  *[facility]uling- General [phone]; Pediatric Dermatology [phone]  *Main  Services: [phone]   Luxembourgish: [phone]   Citizen of [place]: [phone]   Hmong/Montserratian/Uzbek: [phone]    For urgent matters that cannot wait until the next business day, is over a holiday and/or a weekend please call [phone] and ask for the Dermatology Resident On-Call to be paged.                         Follow-ups after your visit        Follow-up notes from your care team     Return for Dr. Quach for follow-up facial peel.      Who to contact     Please call your clinic at [phone] to:    Ask questions about your health    Make or cancel  appointments    Discuss your medicines    Learn about your test results    Speak to your doctor   If you have compliments or concerns about an experience at your clinic, or if you wish to file a complaint, please contact [facility] Patient Relations at [phone] or email us at [email or web address]         Additional Information About Your Visit        Pollsbhart Information     [employer] is an electronic gateway that provides easy, online access to your medical records. With [employer], you can request a clinic appointment, read your test results, renew a prescription or communicate with your care team.     To sign up for [employer] visit the website at [email or web address]   You will be asked to enter the access code listed below, as well as some personal information. Please follow the directions to create your username and password.     Your access code is: [email or web address]  Expires: 2017  3:35 PM     Your access code will  in 90 days. If you need help or a new code, please contact your [facility] or call [phone] for assistance.        Care EveryWhere ID     This is your Care EveryWhere ID. This could be used by other organizations to access your [place] medical records  [id number]         Blood Pressure from Last 3 Encounters:   17 125/69   17 120/70   17 120/70    Weight from Last 3 Encounters:   17 256 lb 3.2 oz (116.2 kg) (>99 %)*   17 255 lb 6.4 oz (115.8 kg) (>99 %)*   17 253 lb 6.4 oz (114.9 kg) (>99 %)*     * Growth percentiles are based on CDC 2-20 Years data.              Today, you had the following     No orders found for display         Where to get your medicines      These medications were sent to [facility] - [place], [place] - [address] [place] [address] Rd.  [address] [facility] [address]     Phone:  [phone]     drospirenone-ethinyl estradiol 3-0.03 MG  per tablet          Primary Care Provider Office Phone # Fax #    Jose Luis Millan [phone]       [facility] [address] [address]        Equal Access to Services     JASON GARRISON : Hadii aad ku hadyelenaswapnil Soenocali, waaxda luqadaha, qaybta kaalmada adeegyada, laina boyerdawood butterfieldtheresa fordeseth rendon. So [place] -[phone].    ATENCIÓN: Si habla español, tiene a braswell disposición servicios gratuitos de asistencia lingüística. Llame al [phone].    We comply with applicable federal civil rights laws and [place] laws. We do not discriminate on the basis of race, color, national origin, age, disability sex, sexual orientation or gender identity.            Thank you!     Thank you for choosing PEDS DERMATOLOGY  for your care. Our goal is always to provide you with excellent care. Hearing back from our patients is one way we can continue to improve our services. Please take a few minutes to complete the written survey that you may receive in the mail after your visit with us. Thank you!             Your Updated Medication List - Protect others around you: Learn how to safely use, store and throw away your medicines at www.disposemymeds.org.          This list is accurate as of: 5/30/17 11:59 PM.  Always use your most recent med list.                   Brand Name Dispense Instructions for use Diagnosis    * amphetamine-dextroamphetamine 20 MG per 24 hr capsule    ADDERALL XR    30 capsule    Take 1 capsule (20 mg) by mouth daily    Attention deficit hyperactivity disorder (ADHD), predominantly inattentive type       * amphetamine-dextroamphetamine 20 MG per 24 hr capsule    ADDERALL XR    30 capsule    Take 1 capsule (20 mg) by mouth daily    Attention deficit hyperactivity disorder (ADHD), predominantly inattentive type       drospirenone-ethinyl estradiol 3-0.03 MG per tablet    ARELIS 28    84 tablet    Take 1 tablet by mouth daily    Acne vulgaris       * Notice:   This list has 2 medication(s) that are the same as other medications prescribed for you. Read the directions carefully, and ask your doctor or other care provider to review them with you.

## 2017-05-30 NOTE — LETTER
5/30/2017      RE: Juanita Gonzalez  [address]  [facility] [address]       PEDIATRIC DERMATOLOGY FOLLOW-UP VISIT    HISTORY OF PRESENT ILLNESS:  Juanita is a 21 year old who returns to Pediatric Dermatology Clinic today for followup of acne.  She was last seen 08/16/2016, at which time she was restarted on Zoraida.  We also counseled her about being evaluated by a gynecologist, as she was maturing and considering changing her sexual behaviors.  Today she reports that her acne continues to be very well controlled.  She reports that her insurance has been inconsistent, so she has discontinued the Zoraida; however, she is interested in restarting this if possible.  She is most concerned about some pigment change on her face as well as a small papular component and whether or not this would respond to peels.  She has read about this online.      PAST MEDICAL HISTORY AND SOCIAL HISTORY:  Unchanged.      REVIEW OF SYSTEMS:  No history of fevers, chills, weight loss or gain, nausea, vomiting, diarrhea, chronic cough, bone or joint pain, headaches or urinary problems.  No other skin complaints other than noted in HPI.      OBJECTIVE:  There were no vitals taken for this visit.  She is well appearing, in no acute distress.  Skin exam of the scalp, face, neck, chest, abdomen and back was completed today.  Acne was clear.  She had very minimal dyspigmentation of the cheeks and chin area.  She had a few very small, white, atrophic papules.      ASSESSMENT AND PLAN:     1.  History of acne vulgaris with minimal post inflammatory pigment change and minimal scarring.  We reviewed that therapeutic peels can be beneficial for mild acne as well as for chronic changes related to a history of acne.  I think that this is reasonable for Juanita to investigate.  I do not personally perform these peels in our clinic, but my colleague Dr. Marisela Quach does.  I have referred Juanita to Dr. Quach, and we will discuss Juanita  with Dr. Quach prior to her appointment.  I did  that the risk of doing peels in patients with more skin pigment can be a risk of darkening the pigment or changing the pigment for quite some time.  The risk of post inflammatory pigment change is quite high.  Juanita understood this and would like to continue.   2.  Continued control of acne vulgaris.  We will represcribe Zoraida for her.  As this is part of health maintenance, this may be covered at a flat fee for her regardless of her insurance.      Thank you for allowing me to participate in her care.  Followup was arranged in 1 year unless she has any issues earlier.     Grace Whitten MD   , Departments of Dermatology & Pediatrics   Director, Pediatric Dermatology  [facility]  [phone]

## 2017-06-20 NOTE — PROGRESS NOTES
PEDIATRIC DERMATOLOGY FOLLOW-UP VISIT    HISTORY OF PRESENT ILLNESS:  Juanita is a 21 year old who returns to Pediatric Dermatology Clinic today for followup of acne.  She was last seen 08/16/2016, at which time she was restarted on Zoraida.  We also counseled her about being evaluated by a gynecologist, as she was maturing and considering changing her sexual behaviors.  Today she reports that her acne continues to be very well controlled.  She reports that her insurance has been inconsistent, so she has discontinued the Zoraida; however, she is interested in restarting this if possible.  She is most concerned about some pigment change on her face as well as a small papular component and whether or not this would respond to peels.  She has read about this online.      PAST MEDICAL HISTORY AND SOCIAL HISTORY:  Unchanged.      REVIEW OF SYSTEMS:  No history of fevers, chills, weight loss or gain, nausea, vomiting, diarrhea, chronic cough, bone or joint pain, headaches or urinary problems.  No other skin complaints other than noted in HPI.      OBJECTIVE:  There were no vitals taken for this visit.  She is well appearing, in no acute distress.  Skin exam of the scalp, face, neck, chest, abdomen and back was completed today.  Acne was clear.  She had very minimal dyspigmentation of the cheeks and chin area.  She had a few very small, white, atrophic papules.      ASSESSMENT AND PLAN:     1.  History of acne vulgaris with minimal post inflammatory pigment change and minimal scarring.  We reviewed that therapeutic peels can be beneficial for mild acne as well as for chronic changes related to a history of acne.  I think that this is reasonable for Juanita to investigate.  I do not personally perform these peels in our clinic, but my colleague Dr. Marisela Quach does.  I have referred Juanita to Dr. Quach, and we will discuss Juanita with Dr. Quach prior to her appointment.  I did  that the risk of doing peels in  patients with more skin pigment can be a risk of darkening the pigment or changing the pigment for quite some time.  The risk of post inflammatory pigment change is quite high.  Juanita understood this and would like to continue.   2.  Continued control of acne vulgaris.  We will represcribe Zoraida for her.  As this is part of health maintenance, this may be covered at a flat fee for her regardless of her insurance.      Thank you for allowing me to participate in her care.  Followup was arranged in 1 year unless she has any issues earlier.     Grace Whitten MD   , Departments of Dermatology & Pediatrics   Director, Pediatric Dermatology  [facility]  [phone]

## 2017-07-15 ENCOUNTER — NURSE TRIAGE (OUTPATIENT)
Dept: NURSING | Facility: CLINIC | Age: 22
End: 2017-07-15

## 2017-07-16 NOTE — TELEPHONE ENCOUNTER
Started taking BCP about 2 weeks ago. Has been using condoms for now until pill has some time to become effective. However, last night the condom broke. Pt asks if she needs to be concerned about pregnancy and should she get emergency contraception. Disc'd w/ pt that it is recommended that back up method of BC be used until pt has been on BCP for 1 full month. Difficult to say how much risk there is but there is some risk. Would recommend discussing possible ECP w/ provider asap, within 72 hrs of unprotected sex (condom breaking). Carmen Lala RN/FNA    Additional Information    Negative: [1] Vaginal bleeding AND [2] not on birth control pills    Negative: Vaginal discharge (other than bleeding) is main symptom    Negative: [1] SEVERE pain (e.g., excruciating) AND [2] present > 1 hour    Negative: [1] Pregnant AND [2] MODERATE-SEVERE abdominal pain    Negative: [1] Pregnant AND [2] MODERATE-SEVERE vaginal bleeding    Negative: SEVERE vaginal bleeding (i.e., soaking 2 pads or tampons per hour and present 2 or more hours)    Negative: Patient sounds very sick or weak to the triager    Negative: [1] Constant abdominal pain AND [2] present > 2 hours    Negative: DVT suspected (teen with unilateral painful thigh or calf AND edema distal to pain)    Negative: [1] Pregnant AND [2] MILD symptoms (e.g., vaginal spotting, mild abdominal cramping)    Negative: Caller has urgent question and triager unable to answer question    Negative: MODERATE vaginal bleeding (i.e., soaking 1 pad or tampon per hour and present > 6 hours)    Negative: Caller requests refill for birth control pills    Negative: Caller has NON-URGENT question and triager unable to answer question    Negative: [1] Recent unprotected sex (within last 5 days) AND [2] history of missed pills/inconsistent pill use    Negative: [1] Pregnant AND [2] NO vaginal bleeding or abdominal pain    Negative: [1] Vaginal bleeding with > 6 soaked pads or tampons per day AND  "[2] lasts > 7 days    Negative: Caller is concerned about a sexually transmitted infection (STI)    Negative: Missed 3 or more \"active\" pills in a cycle    Negative: [1] Vaginal bleeding with > 6 soaked pads or tampons per day BUT [2] lasts 7 days or less    Negative: Vaginal bleeding lasts > 7 days    Negative: [1] No monthly bleeding AND [2] stopped taking birth control pills 6 or more months ago    Negative: [1] Bleeding or spotting between regular periods AND [2] continues more than 3 months on birth control pills    Negative: [1] No monthly bleeding AND [2] taking progestin-only birth control pills (all triage questions negative)    Negative: [1] No monthly bleeding (or irregular periods) AND [2] stopped taking birth control pills < 6 months ago (all triage questions negative)    Negative: [1] Irregular vaginal bleeding or spotting AND [2] taking birth control pills with NO missed doses (all triage questions negative)    Negative: [1] Irregular vaginal bleeding or spotting AND [2] taking birth control pills and has missed doses (all triage questions negative)    Negative: Missed 1 or more pills, questions about (all triage questions negative)    Birth control pills, questions about    Protocols used: CONTRACEPTION - BIRTH CONTROL PILLS-PEDIATRIC-    "

## 2017-07-24 ENCOUNTER — CARE COORDINATION (OUTPATIENT)
Dept: DERMATOLOGY | Facility: CLINIC | Age: 22
End: 2017-07-24

## 2017-07-24 NOTE — PROGRESS NOTES
"Spoke with call center staff as they are trying to assist Juanita in scheduling a chemical peel and want to make sure it is done correctly. Upon review of the last visit note:    \"History of acne vulgaris with minimal post inflammatory pigment change and minimal scarring.  We reviewed that therapeutic peels can be beneficial for mild acne as well as for chronic changes related to a history of acne.  I think that this is reasonable for Juanita to investigate.  I do not personally perform these peels in our clinic, but my colleague Dr. Marisela Quach does.  I have referred Juanita to Dr. Quach, and we will discuss Juanita with Dr. Quach prior to her appointment.  I did  that the risk of doing peels in patients with more skin pigment can be a risk of darkening the pigment or changing the pigment for quite some time.  The risk of post inflammatory pigment change is quite high.  Juanita understood this and would like to continue.\"    Relayed to call center staff that RN would call patient back later this week after getting advisement from Dr. Quach on how to proceed. Information routed to Dr. Quach for advisement.  "

## 2017-08-01 NOTE — PROGRESS NOTES
Called patient and left message for patient to call back. Please advise her of cost of the chemical peel and please send to [facility] for scheduling. Lissy Wild MA

## 2017-08-08 NOTE — PROGRESS NOTES
Called home number and spoke with Mother Nina. She advised patient not available.  Asked for Juanita to call us back and left clinic number. Lissy Wild MA

## 2017-08-14 ENCOUNTER — TELEPHONE (OUTPATIENT)
Dept: PEDIATRICS | Facility: CLINIC | Age: 22
End: 2017-08-14

## 2017-08-14 DIAGNOSIS — F90.0 ATTENTION DEFICIT HYPERACTIVITY DISORDER (ADHD), PREDOMINANTLY INATTENTIVE TYPE: ICD-10-CM

## 2017-08-14 NOTE — TELEPHONE ENCOUNTER
Last visit 5-23-17  ASSESSMENT/PLAN:      1. Attention deficit hyperactivity disorder (ADHD), predominantly inattentive type  Will continue on current medication.  Has not filled recent Rx because of change in insurance.  Will be able to refill medication in the next week or so.  Is out of school right now, and so not concerned about being off medication.  Will be doing summer school, starting in August.  FOLLOW UP: in 3 month(s)     Jose Luis Millan MD

## 2017-08-15 RX ORDER — DEXTROAMPHETAMINE SACCHARATE, AMPHETAMINE ASPARTATE MONOHYDRATE, DEXTROAMPHETAMINE SULFATE AND AMPHETAMINE SULFATE 5; 5; 5; 5 MG/1; MG/1; MG/1; MG/1
20 CAPSULE, EXTENDED RELEASE ORAL DAILY
Qty: 30 CAPSULE | Refills: 0 | Status: SHIPPED | OUTPATIENT
Start: 2017-08-15 | End: 2017-09-06

## 2017-08-15 NOTE — TELEPHONE ENCOUNTER
Rx: amphetamine-dextroamphetamine (ADDERALL XR) 20 MG per 24 hr capsule has been placed at the  for pick-up. Unable to leave voicemail message on home number listed.Beatrice Cannon,

## 2017-08-16 NOTE — TELEPHONE ENCOUNTER
Patient calling in to state that she would like her script for adderall sent to the [facility].  She states she normally picks it up there.   If there are any questions please call Juanita on her cell phone:  [phone].  Thanks.    Serenity TOURE  Patient Representative  [place]

## 2017-08-30 ENCOUNTER — TELEPHONE (OUTPATIENT)
Dept: DERMATOLOGY | Facility: CLINIC | Age: 22
End: 2017-08-30

## 2017-08-30 DIAGNOSIS — L70.0 ACNE VULGARIS: ICD-10-CM

## 2017-08-30 RX ORDER — DROSPIRENONE AND ETHINYL ESTRADIOL 0.03MG-3MG
1 KIT ORAL DAILY
Qty: 90 TABLET | Refills: 3 | Status: SHIPPED | OUTPATIENT
Start: 2017-08-30 | End: 2018-08-08

## 2017-08-30 NOTE — TELEPHONE ENCOUNTER
----- Message from Namrata Brown sent at 8/30/2017  1:07 PM CDT -----  Regarding: Prescription Refill  Is an  Needed: no  Callers Name: Juanita  Callers Phone Number: [phone]  Relationship to Patient: Self  Best time of day to call: anytime  Is it ok to leave a detailed voicemail on this number: yes  Name of Medication: Zoraida  Name of Pharmacy(include location): [facility] [address]  Is this a Refill Request: Yes  Juanita Short was calling to request a medication refill for this medication. Thanks!!    Namrata

## 2017-08-30 NOTE — TELEPHONE ENCOUNTER
Returned phone call to pt who explained the pharmacy has only been dispensing one month at a time and now refills are out. Explained to pt she should have received 3 month supply each refill and should of had almost 1 years worth. But would pend new orders to Dr. Whitten to be signed tomorrow. Pt will follow up with her pharmacy tomorrow, pt verbalized understanding and denied questions or concerns. See separate RX encounter.

## 2017-09-06 ENCOUNTER — OFFICE VISIT (OUTPATIENT)
Dept: PEDIATRICS | Facility: CLINIC | Age: 22
End: 2017-09-06
Payer: COMMERCIAL

## 2017-09-06 VITALS
WEIGHT: 251.4 LBS | DIASTOLIC BLOOD PRESSURE: 69 MMHG | HEART RATE: 82 BPM | BODY MASS INDEX: 42.92 KG/M2 | SYSTOLIC BLOOD PRESSURE: 117 MMHG | TEMPERATURE: 97.8 F | HEIGHT: 64 IN

## 2017-09-06 DIAGNOSIS — F90.0 ATTENTION DEFICIT HYPERACTIVITY DISORDER (ADHD), PREDOMINANTLY INATTENTIVE TYPE: Primary | ICD-10-CM

## 2017-09-06 DIAGNOSIS — N92.6 IRREGULAR MENSES: ICD-10-CM

## 2017-09-06 DIAGNOSIS — F43.9 STRESS: ICD-10-CM

## 2017-09-06 DIAGNOSIS — Z11.3 SCREENING FOR STD (SEXUALLY TRANSMITTED DISEASE): ICD-10-CM

## 2017-09-06 PROCEDURE — 87591 N.GONORRHOEAE DNA AMP PROB: CPT | Performed by: PEDIATRICS

## 2017-09-06 PROCEDURE — 87491 CHLMYD TRACH DNA AMP PROBE: CPT | Performed by: PEDIATRICS

## 2017-09-06 PROCEDURE — 99215 OFFICE O/P EST HI 40 MIN: CPT | Performed by: PEDIATRICS

## 2017-09-06 RX ORDER — DEXTROAMPHETAMINE SACCHARATE, AMPHETAMINE ASPARTATE MONOHYDRATE, DEXTROAMPHETAMINE SULFATE AND AMPHETAMINE SULFATE 5; 5; 5; 5 MG/1; MG/1; MG/1; MG/1
20 CAPSULE, EXTENDED RELEASE ORAL DAILY
Qty: 30 CAPSULE | Refills: 0 | Status: SHIPPED | OUTPATIENT
Start: 2017-09-06 | End: 2017-11-28

## 2017-09-06 NOTE — PATIENT INSTRUCTIONS
I think you are dealing with an extreme amount of stress right now.  First off, I think you are handling things extremely well given how much you have going on.  I do recommend continuing to exercise and get at least 8 hours of sleep a night, journal (both good and bad journals) daily, and get re-established with your therapist.  Second we will do some basic testing today and will be in touch with those results.  Please continue to use protective measures in any encounters you have in your new relationship.  Lastly, given your irregular bleeding I have referred you to an Ob/Gyn for further assessment and possible testing at this time.  As you transition I recommend seeing either Dr. Jessica Corral at [facility] or myself at [facility].  Please call with questions or concerns!    Richar Santiago MD  [facility]

## 2017-09-06 NOTE — MR AVS SNAPSHOT
After Visit Summary   9/6/2017    Juanita Gonzalez    MRN: [id number]           Patient Information     Date Of Birth          1995        Visit Information        Provider Department      9/6/2017 5:20 PM Jose Luis Millan MD [facility]        Today's Diagnoses     Attention deficit hyperactivity disorder (ADHD), predominantly inattentive type    -  1    Screening for STD (sexually transmitted disease)        Irregular menses          Care Instructions    I think you are dealing with an extreme amount of stress right now.  First off, I think you are handling things extremely well given how much you have going on.  I do recommend continuing to exercise and get at least 8 hours of sleep a night, journal (both good and bad journals) daily, and get re-established with your therapist.  Second we will do some basic testing today and will be in touch with those results.  Please continue to use protective measures in any encounters you have in your new relationship.  Lastly, given your irregular bleeding I have referred you to an Ob/Gyn for further assessment and possible testing at this time.  As you transition I recommend seeing either Dr. Jessica Corral at [facility] or myself at [facility].  Please call with questions or concerns!    Richar Santiago MD  [facility]          Follow-ups after your visit        Additional Services     OB/GYN REFERRAL       Your provider has referred you to:  [facility]: [facility] [phone]   [email or web address]    Please be aware that coverage of these services is subject to the terms and limitations of your health insurance plan.  Call member services at your health plan with any benefit or coverage questions.      Please bring the following with you to your appointment:    (1) Any X-Rays, CTs or MRIs which have been performed.  Contact  "the facility where they were done to arrange for  prior to your scheduled appointment.   (2) List of current medications   (3) This referral request   (4) Any documents/labs given to you for this referral                  Who to contact     If you have questions or need follow up information about today's clinic visit or your schedule please contact [facility] directly at [phone].  Normal or non-critical lab and imaging results will be communicated to you by MyChart, letter or phone within 4 business days after the clinic has received the results. If you do not hear from us within 7 days, please contact the clinic through [employer] or phone. If you have a critical or abnormal lab result, we will notify you by phone as soon as possible.  Submit refill requests through [employer] or call your pharmacy and they will forward the refill request to us. Please allow 3 business days for your refill to be completed.          Additional Information About Your Visit        [employer] Information     [employer] lets you send messages to your doctor, view your test results, renew your prescriptions, schedule appointments and more. To sign up, go to [email or web address] . Click on \"Log in\" on the left side of the screen, which will take you to the Welcome page. Then click on \"Sign up Now\" on the right side of the page.     You will be asked to enter the access code listed below, as well as some personal information. Please follow the directions to create your username and password.     Your access code is: [email or web address]  Expires: 2017  6:35 PM     Your access code will  in 90 days. If you need help or a new code, please call your [facility] or [phone].        Care EveryWhere ID     This is your Care EveryWhere ID. This could be used by other organizations to access your [place] medical records  [id number]        Your Vitals Were     Pulse Temperature Height BMI (Body Mass " "Index)          82 97.8  F (36.6  C) (Oral) 5' 4.37\" (1.635 m) 42.66 kg/m2         Blood Pressure from Last 3 Encounters:   09/06/17 117/69   05/23/17 125/69   04/05/17 120/70    Weight from Last 3 Encounters:   09/06/17 251 lb 6.4 oz (114 kg) (>99 %)*   05/23/17 256 lb 3.2 oz (116.2 kg) (>99 %)*   04/05/17 255 lb 6.4 oz (115.8 kg) (>99 %)*     * Growth percentiles are based on [facility] 2-20 Years data.              We Performed the Following     CHLAMYDIA TRACHOMATIS PCR     NEISSERIA GONORRHOEA PCR     OB/GYN REFERRAL          Where to get your medicines      Some of these will need a paper prescription and others can be bought over the counter.  Ask your nurse if you have questions.     Bring a paper prescription for each of these medications     amphetamine-dextroamphetamine 20 MG per 24 hr capsule          Primary Care Provider Office Phone # Fax #    Jose Luis Millan [phone]        [place] [address]        Equal Access to Services     [facility]: Hadii jason Parker, juju fortune, qamisael merinoalmajarod bar, laina timmons . So [facility] [phone].    ATENCIÓN: Si habla español, tiene a braswell disposición servicios gratuitos de asistencia lingüística. LlJoint [facility] [phone].    We comply with applicable federal civil rights laws and [place] laws. We do not discriminate on the basis of race, color, national origin, age, disability sex, sexual orientation or gender identity.            Thank you!     Thank you for choosing [facility]  for your care. Our goal is always to provide you with excellent care. Hearing back from our patients is one way we can continue to improve our services. Please take a few minutes to complete the written survey that you may receive in the mail after your visit with us. Thank you!             Your Updated Medication List - Protect others around you: Learn how to safely use, store and " throw away your medicines at www.disposemymeds.org.          This list is accurate as of: 9/6/17  6:35 PM.  Always use your most recent med list.                   Brand Name Dispense Instructions for use Diagnosis    amphetamine-dextroamphetamine 20 MG per 24 hr capsule    ADDERALL XR    30 capsule    Take 1 capsule (20 mg) by mouth daily    Attention deficit hyperactivity disorder (ADHD), predominantly inattentive type       drospirenone-ethinyl estradiol 3-0.03 MG per tablet    ARELIS 28    90 tablet    Take 1 tablet by mouth daily DISPENSE 3 MONTH supply at a time please    Acne vulgaris

## 2017-09-06 NOTE — NURSING NOTE
"Chief Complaint   Patient presents with     A.D.H.D     med check       Initial /69  Pulse 82  Temp 97.8  F (36.6  C) (Oral)  Ht 5' 4.37\" (1.635 m)  Wt 251 lb 6.4 oz (114 kg)  BMI 42.66 kg/m2 Estimated body mass index is 42.66 kg/(m^2) as calculated from the following:    Height as of this encounter: 5' 4.37\" (1.635 m).    Weight as of this encounter: 251 lb 6.4 oz (114 kg).  Medication Reconciliation: complete    "

## 2017-09-06 NOTE — PROGRESS NOTES
SUBJECTIVE:                                                    Juanita Gonzalez is a 21 year old female who presents to clinic today  self because of:    Chief Complaint   Patient presents with     A.D.H.D     med check        HPI:  ADHD Follow-Up    Date of last ADHD office visit: 05/23/2017  Status since last visit: Improving  Taking controlled (daily) medications as prescribed: Yes                                                                           ADHD Medication     Amphetamines Disp Start End    amphetamine-dextroamphetamine (ADDERALL XR) 20 MG per 24 hr capsule 30 capsule 9/6/2017     Sig - Route: Take 1 capsule (20 mg) by mouth daily - Oral    Class: Local Print        She is having increased difficulty with attention span and being more figidity however not significantly more so than before.   She does say being forgetful than before; increased stress from being a  for [employer].  She is also missing a ton of appointments however improving with a planner.  She is most concerned during activities with prolonged sitting and listening specifically in class.  She is compliant with her medications taking the dose around 10 am.  Denies headaches, abdominal pain, issues with sleep.      She is actively trying to enroll in [employer] because the rest of her credits will need to be obtained here as opposed to [employer] and this has been a stressful task to accomplish.  She endorses lots of changes occurring really quickly which has been overwhelming.  She does have a therapist however does not see her regularly.  She does engage in postive thoughts and breathing techniques to help with her stress.  She goes to the gym a couple times a week.  She is happy with her sleep schedule currently 8 hours a night, appropriate sleep pattern.  She is having issues on her new car which is adding even more stress.        School:  Name of SCHOOL: [employer]  Grade:  3rd year   School Concerns/Teacher  Feedback: Stable  School services/Modifications: none  Homework: Stable  Grades: Stable    Sleep: no problems  Home/Family Concerns: Stable  Peer Concerns: Stable    Co-Morbid Diagnosis: Marijuana once daily and alcohol consumption at least once a week    Currently in counseling: Yes, but not regularly seeing a physician      Medication Benefits:   Controlled symptoms: Attention span and Finishing tasks  Uncontrolled symptoms: Short term memory    Medication side effects:  Parent/Patient Concerns with Medications: None  Denies: appetite suppression, insomnia, stomach ache, headache and drowsiness    She endorses a new sexual partner, unprotected activity at time.  No recent STD testing during new relationship.  Denies itching, irritation, vaginal discharge, spotting, and lower abdominal pain.  She has a regular menstrual cycle, lasting 7 days, heavy in nature.  She endorses 2 episodes of vaginal bleeding in between her cycle  Over the past year lasting one day and resulting in 1/2 cup of blood loss.      ROS:  Negative for constitutional, eye, ear, nose, throat, skin, respiratory, cardiac, and gastrointestinal other than those outlined in the HPI.    PROBLEM LIST:  Patient Active Problem List    Diagnosis Date Noted     Osteoma 05/03/2015     Priority: Medium     Café au lait spot 05/03/2015     Priority: Medium     Enchondroma of wrist or hand 04/10/2013     Priority: Medium     Anisometropia 04/17/2012     Priority: Medium     Dysmetabolic syndrome X 04/17/2012     Priority: Medium     Myopia 04/17/2012     Priority: Medium     Insulin resistance 01/04/2012     Priority: Medium     Passed oral glucose tolerance test         Vitamin D deficiency 10/25/2011     Priority: Medium     Eczema 09/02/2011     Priority: Medium     Acne 05/15/2011     Priority: Medium     Improved with use of acne creams.  Has been seen by Dr. Toni Whitten in peds dermatology.         Seasonal allergies 05/15/2011     Priority: Medium      "Managed with loratadine         Acanthosis nigricans 2009     Priority: Medium     Has had a glucose tolerance test with Endocrinology.       Disturbance in sleep behavior 2007     Priority: Medium     Delayed sleep phase, negatve PSG for apnea 2007.  Takes melatonin which helps.  Chronic sleep deprivation but able to have good sleep onset if she allows herself to stop activities at a set bedtime.    Problem list name updated by automated process. Provider to review       Obesity 2006     Priority: Medium     Normal Lipid panel 2010.  Should be rescreened every 3-5 years.  Glucose was normal but fasting insulin was borderline.  Should repeated every 1-2 years.    Problem list name updated by automated process. Provider to review       Attention deficit hyperactivity disorder (ADHD) 2006     Priority: Medium     Followed by Dr. Clement  On Concerta.  Problem list name updated by automated process. Provider to review        MEDICATIONS:  Current Outpatient Prescriptions   Medication Sig Dispense Refill     amphetamine-dextroamphetamine (ADDERALL XR) 20 MG per 24 hr capsule Take 1 capsule (20 mg) by mouth daily 30 capsule 0     drospirenone-ethinyl estradiol (ARELIS 28) 3-0.03 MG per tablet Take 1 tablet by mouth daily DISPENSE 3 MONTH supply at a time please 90 tablet 3      ALLERGIES:  Allergies   Allergen Reactions     Bactrim Hives       Problem list and histories reviewed & adjusted, as indicated.    OBJECTIVE:                                                      /69  Pulse 82  Temp 97.8  F (36.6  C) (Oral)  Ht 5' 4.37\" (1.635 m)  Wt 251 lb 6.4 oz (114 kg)  BMI 42.66 kg/m2   Blood pressure percentiles are 79 % systolic and 72 % diastolic based on NHBPEP's 4th Report. Blood pressure percentile targets: 90: 122/77, 95: 126/81, 99 + 5 mmH/93.    GENERAL:  Alert and interactive., EYES:  Normal extra-ocular movements.  PERRLA, LUNGS:  Clear, HEART:  Normal rate and " rhythm.  Normal S1 and S2.  No murmurs., ABDOMEN:  Soft, non-tender, no organomegaly. and NEURO:  No tics or tremor.  Normal tone and strength. Normal gait and balance. Skin: Hyperpigmented skin around nape of neck consistent with acanthosis nigrcans.      DIAGNOSTICS: GC vaginal swab pending.      ASSESSMENT/PLAN:                                                      1. Attention deficit hyperactivity disorder (ADHD), predominantly inattentive type    2. Screening for STD (sexually transmitted disease)    3. Irregular menses    4. Stress    - refilled adderall XR 20 mg once daily for ADHD, inattentive type  - lifestyle modifications including improved sleep hygiene with blue light filter/night mode on phone, increased physical activity, breathing/counting techniques, journaling, and therapist  - screen for GC, vaginal swab given new sexual partner  - Ob/Gyn referral given irregular menses despite endorsed compliance with OCP concerning for HPA axis dysregulation eg PCOS, low likelihood for trauma or local pathologic process at this time based on lack of signs/symptoms    FOLLOW UP: in 4-6 week(s) if symptoms not improving    The patient was seen and discussed with Dr. Jose Luis Millan, the attending, who agrees with the plan as stated above.    Richar Santiago MD  IM/PEDS, PGY3  pager [phone]    Attending:  Patient seen, examined and discussed with resident.  Agree with above assessment and plan.  Spent over 50% of the visit in face-to face counseling.  Total visit time: 40 minutes.    Jose Luis Millan MD

## 2017-09-07 LAB
C TRACH DNA SPEC QL NAA+PROBE: NEGATIVE
N GONORRHOEA DNA SPEC QL NAA+PROBE: NEGATIVE
SPECIMEN SOURCE: NORMAL
SPECIMEN SOURCE: NORMAL

## 2017-09-15 ENCOUNTER — TELEPHONE (OUTPATIENT)
Dept: PEDIATRICS | Facility: CLINIC | Age: 22
End: 2017-09-15

## 2017-11-28 DIAGNOSIS — F90.0 ATTENTION DEFICIT HYPERACTIVITY DISORDER (ADHD), PREDOMINANTLY INATTENTIVE TYPE: ICD-10-CM

## 2017-11-28 RX ORDER — DEXTROAMPHETAMINE SACCHARATE, AMPHETAMINE ASPARTATE MONOHYDRATE, DEXTROAMPHETAMINE SULFATE AND AMPHETAMINE SULFATE 5; 5; 5; 5 MG/1; MG/1; MG/1; MG/1
20 CAPSULE, EXTENDED RELEASE ORAL DAILY
Qty: 30 CAPSULE | Refills: 0 | Status: SHIPPED | OUTPATIENT
Start: 2017-11-28 | End: 2019-07-11

## 2017-11-28 NOTE — TELEPHONE ENCOUNTER
Reason for Call:  Medication or medication refill: Refill    Do you use a [facility]?  Yes  Name of the pharmacy and phone number for the current request:  [facility]    Name of the medication requested:  Adderall    Other request: Patient will  the script at the [facility]     Can we leave a detailed message on this number? YES    Phone number patient can be reached at: Cell number on file:    Telephone Information:   Mobile [phone]       Best Time: Any     Call taken on 11/28/2017 at 4:40 PM by Nellie Diop

## 2017-11-29 NOTE — TELEPHONE ENCOUNTER
Rx: amphetamine-dextroamphetamine (ADDERALL XR) 20 MG per 24 hr capsule has been placed at the  for pick-up..Beatrice Cannon,

## 2018-02-21 DIAGNOSIS — F90.0 ATTENTION DEFICIT HYPERACTIVITY DISORDER (ADHD), PREDOMINANTLY INATTENTIVE TYPE: ICD-10-CM

## 2018-02-21 RX ORDER — DEXTROAMPHETAMINE SACCHARATE, AMPHETAMINE ASPARTATE MONOHYDRATE, DEXTROAMPHETAMINE SULFATE AND AMPHETAMINE SULFATE 5; 5; 5; 5 MG/1; MG/1; MG/1; MG/1
20 CAPSULE, EXTENDED RELEASE ORAL DAILY
Qty: 30 CAPSULE | Refills: 0 | Status: CANCELLED | OUTPATIENT
Start: 2018-02-21

## 2018-02-27 ENCOUNTER — OFFICE VISIT (OUTPATIENT)
Dept: PEDIATRICS | Facility: CLINIC | Age: 23
End: 2018-02-27
Payer: MEDICAID

## 2018-02-27 VITALS
HEART RATE: 100 BPM | HEIGHT: 65 IN | SYSTOLIC BLOOD PRESSURE: 120 MMHG | BODY MASS INDEX: 42.69 KG/M2 | DIASTOLIC BLOOD PRESSURE: 75 MMHG | WEIGHT: 256.2 LBS | TEMPERATURE: 99.7 F

## 2018-02-27 DIAGNOSIS — F90.0 ATTENTION DEFICIT HYPERACTIVITY DISORDER (ADHD), PREDOMINANTLY INATTENTIVE TYPE: ICD-10-CM

## 2018-02-27 PROCEDURE — 99215 OFFICE O/P EST HI 40 MIN: CPT | Performed by: PEDIATRICS

## 2018-02-27 RX ORDER — DEXTROAMPHETAMINE SACCHARATE, AMPHETAMINE ASPARTATE MONOHYDRATE, DEXTROAMPHETAMINE SULFATE AND AMPHETAMINE SULFATE 5; 5; 5; 5 MG/1; MG/1; MG/1; MG/1
20 CAPSULE, EXTENDED RELEASE ORAL DAILY
Qty: 30 CAPSULE | Refills: 0 | Status: SHIPPED | OUTPATIENT
Start: 2018-05-02 | End: 2018-06-01

## 2018-02-27 RX ORDER — DEXTROAMPHETAMINE SACCHARATE, AMPHETAMINE ASPARTATE MONOHYDRATE, DEXTROAMPHETAMINE SULFATE AND AMPHETAMINE SULFATE 5; 5; 5; 5 MG/1; MG/1; MG/1; MG/1
20 CAPSULE, EXTENDED RELEASE ORAL DAILY
Qty: 30 CAPSULE | Refills: 0 | Status: SHIPPED | OUTPATIENT
Start: 2018-04-01 | End: 2018-05-01

## 2018-02-27 RX ORDER — DEXTROAMPHETAMINE SACCHARATE, AMPHETAMINE ASPARTATE MONOHYDRATE, DEXTROAMPHETAMINE SULFATE AND AMPHETAMINE SULFATE 5; 5; 5; 5 MG/1; MG/1; MG/1; MG/1
20 CAPSULE, EXTENDED RELEASE ORAL DAILY
Qty: 30 CAPSULE | Refills: 0 | Status: SHIPPED | OUTPATIENT
Start: 2018-03-01 | End: 2018-03-31

## 2018-02-27 NOTE — PROGRESS NOTES
"SUBJECTIVE:   Juanita Gonzalez is a 22 year old female who presents to clinic today on her own because of:    Chief Complaint   Patient presents with     Recheck Medication     ADHD        HPI  ADHD Follow-Up    Date of last ADHD office visit: 09/06/17  Status since last visit: Stable  Taking controlled (daily) medications as prescribed: Yes                       Parent/Patient Concerns with Medications: None  ADHD Medication     Amphetamines Disp Start End    amphetamine-dextroamphetamine (ADDERALL XR) 20 MG per 24 hr capsule 30 capsule 11/28/2017     Sig - Route: Take 1 capsule (20 mg) by mouth daily - Oral    Class: [employer]          School:  Name of  : [employer]  Grade: 3rd year    School services/Modifications: none  Homework: Improving  Grades: Improving    Sleep: no problems  Home/Family Concerns: Improving  Peer Concerns: Improving    Co-Morbid Diagnosis: None  Currently in counseling: No      Medication Benefits:   Controlled symptoms: Attention span, Distractability and Finishing tasks  Uncontrolled symptoms: None    Medication side effects:  Side effects noted: none  Denies: appetite suppression, weight loss, insomnia, tics, palpitations, stomach ache, headache, emotional lability, rebound irritability, drowsiness, \"zombie\" effect, growth suppression and dry mouth      No other concerns.     ROS  GENERAL:  NEGATIVE for fever, poor appetite, and sleep disruption.  SKIN:  NEGATIVE for rash, hives, and eczema.  EYE:  NEGATIVE for pain, discharge, redness, itching and vision problems.  ENT:  NEGATIVE for ear pain, runny nose, congestion and sore throat.  RESP:  NEGATIVE for cough, wheezing, and difficulty breathing.  CARDIAC:  NEGATIVE for chest pain and cyanosis.   GI:  NEGATIVE for vomiting, diarrhea, abdominal pain and constipation.  :  NEGATIVE for urinary problems.  NEURO:  NEGATIVE for headache and weakness.  ALLERGY:  As in Allergy History  MSK:  NEGATIVE for muscle problems and " joint problems.    PROBLEM LIST  Patient Active Problem List    Diagnosis Date Noted     Osteoma 05/03/2015     Priority: Medium     Café au lait spot 05/03/2015     Priority: Medium     Enchondroma of wrist or hand 04/10/2013     Priority: Medium     Anisometropia 04/17/2012     Priority: Medium     Dysmetabolic syndrome X 04/17/2012     Priority: Medium     Myopia 04/17/2012     Priority: Medium     Insulin resistance 01/04/2012     Priority: Medium     Passed oral glucose tolerance test         Vitamin D deficiency 10/25/2011     Priority: Medium     Eczema 09/02/2011     Priority: Medium     Acne 05/15/2011     Priority: Medium     Improved with use of acne creams.  Has been seen by Dr. Toni Whitten in peds dermatology.         Seasonal allergies 05/15/2011     Priority: Medium     Managed with loratadine         Acanthosis nigricans 11/04/2009     Priority: Medium     Has had a glucose tolerance test with Endocrinology.       Disturbance in sleep behavior 09/12/2007     Priority: Medium     Delayed sleep phase, negatve PSG for apnea 9/2007.  Takes melatonin which helps.  Chronic sleep deprivation but able to have good sleep onset if she allows herself to stop activities at a set bedtime.    Problem list name updated by automated process. Provider to review       Obesity 07/12/2006     Priority: Medium     Normal Lipid panel Feb, 2010.  Should be rescreened every 3-5 years.  Glucose was normal but fasting insulin was borderline.  Should repeated every 1-2 years.    Problem list name updated by automated process. Provider to review       Attention deficit hyperactivity disorder (ADHD) 07/12/2006     Priority: Medium     Followed by Dr. Peña.  On Concerta.  Problem list name updated by automated process. Provider to review        MEDICATIONS  Current Outpatient Prescriptions   Medication Sig Dispense Refill     amphetamine-dextroamphetamine (ADDERALL XR) 20 MG per 24 hr capsule Take 1 capsule (20 mg) by mouth  "daily 30 capsule 0     drospirenone-ethinyl estradiol (ARELIS 28) 3-0.03 MG per tablet Take 1 tablet by mouth daily DISPENSE 3 MONTH supply at a time please 90 tablet 3      ALLERGIES  Allergies   Allergen Reactions     Bactrim Hives       Reviewed and updated as needed this visit by clinical staff  Tobacco  Allergies  Meds  Med Hx  Surg Hx  Fam Hx  Soc Hx        Reviewed and updated as needed this visit by Provider       OBJECTIVE:       /75  Pulse 100  Temp 99.7  F (37.6  C) (Oral)  Ht 5' 4.57\" (1.64 m)  Wt 256 lb 3.2 oz (116.2 kg)  BMI 43.21 kg/m2  Normalized stature-for-age data not available for patients older than 20 years.  Normalized weight-for-age data not available for patients older than 20 years.  Normalized BMI data available only for age 0 to 20 years.  Normalized stature-for-age data not available for patients older than 20 years.    GENERAL: Active, alert, in no acute distress.  SKIN: Clear. No significant rash, abnormal pigmentation or lesions  HEAD: Normocephalic.  EYES:  No discharge or erythema. Normal pupils and EOM.  EARS: Normal canals. Tympanic membranes are normal; gray and translucent.  NOSE: Normal without discharge.  MOUTH/THROAT: Clear. No oral lesions. Teeth intact without obvious abnormalities.  NECK: Supple, no masses.  LYMPH NODES: No adenopathy  LUNGS: Clear. No rales, rhonchi, wheezing or retractions  HEART: Regular rhythm. Normal S1/S2. No murmurs.  ABDOMEN: Soft, non-tender, not distended, no masses or hepatosplenomegaly. Bowel sounds normal.     DIAGNOSTICS: None    ASSESSMENT/PLAN:   1. Attention deficit hyperactivity disorder (ADHD), predominantly inattentive type    - amphetamine-dextroamphetamine (ADDERALL XR) 20 MG per 24 hr capsule; Take 1 capsule (20 mg) by mouth daily  Dispense: 30 capsule; Refill: 0  - amphetamine-dextroamphetamine (ADDERALL XR) 20 MG per 24 hr capsule; Take 1 capsule (20 mg) by mouth daily  Dispense: 30 capsule; Refill: 0  - " amphetamine-dextroamphetamine (ADDERALL XR) 20 MG per 24 hr capsule; Take 1 capsule (20 mg) by mouth daily  Dispense: 30 capsule; Refill: 0    FOLLOW UP: As Juanita is 22 years old, she will be establishing with an Internist for care moving forward.    Spent over 50% in face-to-face lifestyle (sleep, nutrition, stress reduction, future life plans) counseling.  Total visit time: 40 minutes.     Jose Luis Millan MD

## 2018-02-27 NOTE — MR AVS SNAPSHOT
"              After Visit Summary   2/27/2018    Juanita Gonzalez    MRN: [id number]           Patient Information     Date Of Birth          1995        Visit Information        Provider Department      2/27/2018 5:20 PM Jose Luis Millan MD [facility]        Today's Diagnoses     Attention deficit hyperactivity disorder (ADHD), predominantly inattentive type           Follow-ups after your visit        Who to contact     If you have questions or need follow up information about today's clinic visit or your schedule please contact [facility] directly at [phone].  Normal or non-critical lab and imaging results will be communicated to you by Trust Micohart, letter or phone within 4 business days after the clinic has received the results. If you do not hear from us within 7 days, please contact the clinic through Evochat or phone. If you have a critical or abnormal lab result, we will notify you by phone as soon as possible.  Submit refill requests through [facility] Mailing or call your pharmacy and they will forward the refill request to us. Please allow 3 business days for your refill to be completed.          Additional Information About Your Visit        MyChart Information     [facility] Mailing gives you secure access to your electronic health record. If you see a primary care provider, you can also send messages to your care team and make appointments. If you have questions, please call your primary care clinic.  If you do not have a primary care provider, please call [phone] and they will assist you.        Care EveryWhere ID     This is your Care EveryWhere ID. This could be used by other organizations to access your [facility] medical records  [id number]        Your Vitals Were     Pulse Temperature Height BMI (Body Mass Index)          100 99.7  F (37.6  C) (Oral) 5' 4.57\" (1.64 m) 43.21 kg/m2         Blood Pressure from Last 3 Encounters:   02/27/18 120/75 "   09/06/17 117/69   05/23/17 125/69    Weight from Last 3 Encounters:   02/27/18 256 lb 3.2 oz (116.2 kg)   09/06/17 251 lb 6.4 oz (114 kg) (>99 %)*   05/23/17 256 lb 3.2 oz (116.2 kg) (>99 %)*     * Growth percentiles are based on [facility] 2-20 Years data.              Today, you had the following     No orders found for display         Today's Medication Changes          These changes are accurate as of 2/27/18  6:08 PM.  If you have any questions, ask your nurse or doctor.               These medicines have changed or have updated prescriptions.        Dose/Directions    * amphetamine-dextroamphetamine 20 MG per 24 hr capsule   Commonly known as:  ADDERALL XR   This may have changed:  Another medication with the same name was added. Make sure you understand how and when to take each.   Used for:  Attention deficit hyperactivity disorder (ADHD), predominantly inattentive type   Changed by:  Jose Luis Millan MD        Dose:  20 mg   Take 1 capsule (20 mg) by mouth daily   Quantity:  30 capsule   Refills:  0       * amphetamine-dextroamphetamine 20 MG per 24 hr capsule   Commonly known as:  ADDERALL XR   This may have changed:  You were already taking a medication with the same name, and this prescription was added. Make sure you understand how and when to take each.   Used for:  Attention deficit hyperactivity disorder (ADHD), predominantly inattentive type   Changed by:  Jose Luis Millan MD        Dose:  20 mg   Start taking on:  3/1/2018   Take 1 capsule (20 mg) by mouth daily   Quantity:  30 capsule   Refills:  0       * amphetamine-dextroamphetamine 20 MG per 24 hr capsule   Commonly known as:  ADDERALL XR   This may have changed:  You were already taking a medication with the same name, and this prescription was added. Make sure you understand how and when to take each.   Used for:  Attention deficit hyperactivity disorder (ADHD), predominantly inattentive type   Changed by:  Jose Luis Millan MD        Dose:  20 mg   Start  taking on:  4/1/2018   Take 1 capsule (20 mg) by mouth daily   Quantity:  30 capsule   Refills:  0       * amphetamine-dextroamphetamine 20 MG per 24 hr capsule   Commonly known as:  ADDERALL XR   This may have changed:  You were already taking a medication with the same name, and this prescription was added. Make sure you understand how and when to take each.   Used for:  Attention deficit hyperactivity disorder (ADHD), predominantly inattentive type   Changed by:  Jose Luis Millan MD        Dose:  20 mg   Start taking on:  5/2/2018   Take 1 capsule (20 mg) by mouth daily   Quantity:  30 capsule   Refills:  0       * Notice:  This list has 4 medication(s) that are the same as other medications prescribed for you. Read the directions carefully, and ask your doctor or other care provider to review them with you.         Where to get your medicines      Some of these will need a paper prescription and others can be bought over the counter.  Ask your nurse if you have questions.     Bring a paper prescription for each of these medications     amphetamine-dextroamphetamine 20 MG per 24 hr capsule    amphetamine-dextroamphetamine 20 MG per 24 hr capsule    amphetamine-dextroamphetamine 20 MG per 24 hr capsule                Primary Care Provider Office Phone # Fax #    Jose Luis Millan [phone]       [address] [place] [address]        Equal Access to Services     JASON GARRISON : Lisa winslowo Sogirish, waaxda luqadaha, qaybta kaalmada adeegyada, laina rendon. So [facility] [phone].    ATENCIÓN: Si habla español, tiene a braswell disposición servicios gratuitos de asistencia lingüística. Llame al [phone].    We comply with applicable federal civil rights laws and [place] laws. We do not discriminate on the basis of race, color, national origin, age, disability, sex, sexual orientation, or gender identity.            Thank you!     Thank you for choosing  [facility]  for your care. Our goal is always to provide you with excellent care. Hearing back from our patients is one way we can continue to improve our services. Please take a few minutes to complete the written survey that you may receive in the mail after your visit with us. Thank you!             Your Updated Medication List - Protect others around you: Learn how to safely use, store and throw away your medicines at www.disposemymeds.org.          This list is accurate as of 2/27/18  6:08 PM.  Always use your most recent med list.                   Brand Name Dispense Instructions for use Diagnosis    * amphetamine-dextroamphetamine 20 MG per 24 hr capsule    ADDERALL XR    30 capsule    Take 1 capsule (20 mg) by mouth daily    Attention deficit hyperactivity disorder (ADHD), predominantly inattentive type       * amphetamine-dextroamphetamine 20 MG per 24 hr capsule   Start taking on:  3/1/2018    ADDERALL XR    30 capsule    Take 1 capsule (20 mg) by mouth daily    Attention deficit hyperactivity disorder (ADHD), predominantly inattentive type       * amphetamine-dextroamphetamine 20 MG per 24 hr capsule   Start taking on:  4/1/2018    ADDERALL XR    30 capsule    Take 1 capsule (20 mg) by mouth daily    Attention deficit hyperactivity disorder (ADHD), predominantly inattentive type       * amphetamine-dextroamphetamine 20 MG per 24 hr capsule   Start taking on:  5/2/2018    ADDERALL XR    30 capsule    Take 1 capsule (20 mg) by mouth daily    Attention deficit hyperactivity disorder (ADHD), predominantly inattentive type       drospirenone-ethinyl estradiol 3-0.03 MG per tablet    ARELIS 28    90 tablet    Take 1 tablet by mouth daily DISPENSE 3 MONTH supply at a time please    Acne vulgaris       * Notice:  This list has 4 medication(s) that are the same as other medications prescribed for you. Read the directions carefully, and ask your doctor or other care provider to review  them with you.

## 2018-03-12 ENCOUNTER — THERAPY VISIT (OUTPATIENT)
Dept: CHIROPRACTIC MEDICINE | Facility: CLINIC | Age: 23
End: 2018-03-12
Payer: COMMERCIAL

## 2018-03-12 DIAGNOSIS — M99.03 SEGMENTAL DYSFUNCTION OF LUMBAR REGION: ICD-10-CM

## 2018-03-12 DIAGNOSIS — M54.50 LUMBAGO: ICD-10-CM

## 2018-03-12 DIAGNOSIS — M62.838 SPASM OF MUSCLE: ICD-10-CM

## 2018-03-12 DIAGNOSIS — M99.05 SEGMENTAL DYSFUNCTION OF PELVIC REGION: Primary | ICD-10-CM

## 2018-03-12 DIAGNOSIS — M99.02 THORACIC SEGMENT DYSFUNCTION: ICD-10-CM

## 2018-03-12 PROCEDURE — 98941 CHIROPRACT MANJ 3-4 REGIONS: CPT | Mod: AT | Performed by: CHIROPRACTOR

## 2018-03-12 PROCEDURE — 99203 OFFICE O/P NEW LOW 30 MIN: CPT | Mod: 25 | Performed by: CHIROPRACTOR

## 2018-03-12 NOTE — PROGRESS NOTES
Initial Chiropractic Clinic Visit    PCP: Jose Luis Millan Susannah Gonzalez is a 22 year old female who is seen  as a self referral presenting with low back pain . Patient reports that the onset was a few months ago insidiously. When asked, patient denies:, falling, slipping, bending and reaching or sleeping awkwardly. Juanita reports that she just started having low back pain and then after a week or so the pain subsided  She is her today because she is still having some residual stiffness..     Injury: none    Location of Pain: lower lumbar at the following level(s) L4  and L5   Duration of Pain: few month(s)  Rating of Pain at worst: 7/10  Rating of Pain Currently: 0/10  Symptoms are better with: Rest  Symptoms are worse with: sitting and standing  Additional Features:      Health History  as reported by the patient:    How does the patient rate their own health:   Good    Current or past medical history:   Asthma and Migraines/headaches    Medical allergies  Other: bactrim    Past Traumas/Surgeries  Orthopedic: hand surgery    Family History  The family history includes Asthma in her mother; CANCER in her maternal grandfather; Glaucoma in her maternal aunt and another family member; Hypertension in her maternal grandmother and paternal grandmother; Obesity in her father. There is no history of Macular Degeneration.    Medications:  other:  Birth control, adderall    Occupation:  barista    Primary job tasks:   Driving, Lifting/carrying, Prolonged standing and Repetitive tasks    Barriers as home/work:   none    Additional health Issues:                 Juanita was asked to complete the Oswestry Low Back Disability Index and Sophia Start Back screening tool, today in the office.  Disability score: 6%. Keel Start Total Score:1 Sub Score: 0     Review of Systems  Musculoskeletal: as above  Remainder of review of systems is negative including constitutional, CV, pulmonary, GI, Skin and Neurologic except as noted in  "HPI or medical history.    Past Medical History:   Diagnosis Date     Allergic conjunctivitis      Anisometropia      Asthma      Attention deficit disorder with hyperactivity(314.01)     stopped medication 1 year ago     Myopia      Nonorganic enuresis     voiding dysfunction      Obesity, unspecified      OCD (obsessive compulsive disorder)      Wears contact lenses      Past Surgical History:   Procedure Laterality Date     DENTAL SURGERY       GRAFT BONE TO FINGER  3/18/2013    Procedure: GRAFT BONE TO FINGER;  Right Second Metacarpal Curettage and Allograft;  Surgeon: Jeremiah Yu MD;  Location: [facility] OR     HC TOOTH EXTRACTION W/FORCEP       LE FORT ONE N/A 6/11/2015    Procedure: LE FORT ONE;  Surgeon: Vitaliy Yun DDS;  Location: [facility] OR     LE FORT ONE , OSTEOTOMY SAGITTAL SPLIT, COMBINED N/A 12/3/2014    Procedure: COMBINED LE FORT ONE, OSTEOTOMY SAGITTAL SPLIT;  Surgeon: Vitaliy Yun DDS;  Location: [facility] OR     TONGUE SURGERY  9/17/2014    tongue lesion removed     Objective  There were no vitals taken for this visit.      GENERAL APPEARANCE: healthy, alert and no distress   GAIT: NORMAL  SKIN: no suspicious lesions or rashes  NEURO: Normal strength and tone, mentation intact and speech normal  PSYCH:  mentation appears normal and affect normal/bright    Low back exam:    Inspection:  \"     no visible deformity in the low back       normal skin\",    ROM:       full flexion       full extension    Tender:       paraspinal muscles    Non Tender:       remainder of lumbar spine    Strength:       hip flexion 5/5 Normal       knee extension 5/5 Normal       ankle dorsiflexion 5/5 Normal       ankle plantarflexion 5/5 Normal       dorsiflexion of the great toe 5/5 Normal    Reflexes:       patellar (L3, L4) 2 bilaterally    Sensation:      grossly intact throughout lower extremities    Special tests:  SLR - Right negative and Left negative, Fabere - Right negative and Left negative, Yeoman's - " Right negative and Left negative, Tim - Right negative and Left negative and Ely's - Right negative and Left negative    Segmental spinal dysfunction/restrictions found at::  T10 Extension restriction  T11 Extension restriction  T12 Extension restriction  L4 Right rotation restricted  L5 Right rotation restricted  PSIS Right Extension restriction.    The following soft tissue hypotonicities were observed:Piriformis: right, referred pain: no    Trigger points were found in:Lumbar erector spine and Piriformis    Muscle spasm found in:Lumbar erector spine and Piriformis      Radiology:      Assessment:    1. Segmental dysfunction of pelvic region    2. Lumbago    3. Segmental dysfunction of lumbar region    4. Spasm of muscle    5. Thoracic segment dysfunction        RX ordered/plan of care  Anticipated outcomes  Possible risks and side effects    After discussing the risk and benefits of care, patient consented to treatment    Prognosis: Good      Patient's condition:  Patient had restrictions pre-manipulation    Treatment effectiveness:  Post manipulation there is better intersegmental movement and Patient claims to feel looser post manipulation      Plan:    Procedures:  Evaluation and Management:  59285 Moderate level exam 30 min    CMT:  19596 Chiropractic manipulative treatment 3-4 regions performed   Thoracic: Diversified, T10, T11, T12, Prone  Lumbar: Activator, L4, L5, Prone  Pelvis: Drop Table, PSIS Right , Prone    Modalities:  24853: Heat:   For 5 min to Piriformis  10931: MSTM:  To Piriformis  for 5 min    Therapeutic procedures:  None    Response to Treatment  Reduction in symptoms as reported by patient      Treatment plan and goals:  Goals:  NED: To change NED score from 14 to 0    Frequency of care  Duration of care is estimated to be 6 weeks, from the initial treatment.  It is estimated that the patient will need a total of 6 visits to resolve this episode.  For the initial therapeutic trial of  care, the frequency is recommended at 1 X week, once daily.  A reevaluation would be clinically appropriate in 6 visits, to determine progress and further course of care.    In-Office Treatment  Evaluation  Spinal Chiropractic Manipulative Therapy:    Modalities: Heat and MSTM        Recommendations:    Instructions:ice 20 minutes every other hour as needed    Follow-up:  Return to care in one week.       Discussed the assessment with the patient.      Disclaimer: This note consists of symbols derived from keyboarding, dictation and/or voice recognition software. As a result, there may be errors in the script that have gone undetected. Please consider this when interpreting information found in this chart.

## 2018-03-21 ENCOUNTER — THERAPY VISIT (OUTPATIENT)
Dept: CHIROPRACTIC MEDICINE | Facility: CLINIC | Age: 23
End: 2018-03-21
Payer: COMMERCIAL

## 2018-03-21 DIAGNOSIS — M99.02 THORACIC SEGMENT DYSFUNCTION: ICD-10-CM

## 2018-03-21 DIAGNOSIS — M62.838 SPASM OF MUSCLE: ICD-10-CM

## 2018-03-21 DIAGNOSIS — M54.50 LUMBAGO: ICD-10-CM

## 2018-03-21 DIAGNOSIS — M99.05 SEGMENTAL DYSFUNCTION OF PELVIC REGION: Primary | ICD-10-CM

## 2018-03-21 DIAGNOSIS — M99.03 SEGMENTAL DYSFUNCTION OF LUMBAR REGION: ICD-10-CM

## 2018-03-21 PROCEDURE — 98941 CHIROPRACT MANJ 3-4 REGIONS: CPT | Mod: AT | Performed by: CHIROPRACTOR

## 2018-03-21 NOTE — PROGRESS NOTES
Visit #:  2 of 6, based on treatment plan    Subjective:  Juanita Gonzalez is a 22 year old female who is seen in f/u up for:        Segmental dysfunction of pelvic region  Lumbago  Segmental dysfunction of lumbar region  Spasm of muscle  Thoracic segment dysfunction.     Since last visit on 3/12/2018,  Juanita Gonzalez reports the following changes: Pain immediately after last treatment: 0/10 and their pain level today 0/10.  Juanita reports that she has been feeling pretty good all week, she is just a little stiff today.    Area of chief complaint:  Lumbar :  Symptoms are graded at 0/10. The quality is described as stiff.  Motion has increased, but is still not normal. Patient feels that they are improved due to a reduction in symptoms.        Objective:  The following was observed:    P: palpatory tenderness, Piriformis R>>L    A: static palpation demonstrates intersegmental asymmetry , thoracic, lumbar, pelvis    R: motion palpation notes restricted motion, :  T10 Extension restriction  T11 Extension restriction  T12 Extension restriction  L4 Right rotation restricted  L5 Right rotation restricted  PSIS Right Extension restriction    T: hypertonicity at: Piriformis R>>L      Assessment:    Segmental spinal dysfunction/restrictions found at:  T10  T11  T12  L4  L5  PSIS Right    Diagnoses:      1. Segmental dysfunction of pelvic region    2. Lumbago    3. Segmental dysfunction of lumbar region    4. Spasm of muscle    5. Thoracic segment dysfunction        Patient's condition:  Patient had restrictions pre-manipulation    Treatment effectiveness:  Post manipulation there is better intersegmental movement and Patient claims to feel looser post manipulation      Procedures:  CMT:  02472 Chiropractic manipulative treatment 3-4 regions performed   Thoracic: Diversified, T10, T11, T12, Prone  Lumbar: Activator, L4, L5, Prone  Pelvis: Drop Table, PSIS Right , Prone    Modalities:  42863: MSTM:  To Piriformis   for 5 min    Therapeutic procedures:  None      Prognosis: Good    Progress towards Goals: Patient is making progress towards the goal     Response to Treatment:   Reduction in symptoms as reported by patient      Recommendations:    Instructions:ice 20 minutes every other hour as needed    Follow-up:   Return to care in two weeks.

## 2018-04-04 ENCOUNTER — THERAPY VISIT (OUTPATIENT)
Dept: CHIROPRACTIC MEDICINE | Facility: CLINIC | Age: 23
End: 2018-04-04
Payer: COMMERCIAL

## 2018-04-04 DIAGNOSIS — M54.50 LUMBAGO: ICD-10-CM

## 2018-04-04 DIAGNOSIS — M99.03 SEGMENTAL DYSFUNCTION OF LUMBAR REGION: ICD-10-CM

## 2018-04-04 DIAGNOSIS — M99.05 SEGMENTAL DYSFUNCTION OF PELVIC REGION: Primary | ICD-10-CM

## 2018-04-04 DIAGNOSIS — M99.02 THORACIC SEGMENT DYSFUNCTION: ICD-10-CM

## 2018-04-04 DIAGNOSIS — M62.838 SPASM OF MUSCLE: ICD-10-CM

## 2018-04-04 PROCEDURE — 98941 CHIROPRACT MANJ 3-4 REGIONS: CPT | Mod: AT | Performed by: CHIROPRACTOR

## 2018-04-04 NOTE — PROGRESS NOTES
Visit #:  4 of 6, based on treatment plan    Subjective:  Juanita Gonzalez is a 22 year old female who is seen in f/u up for:        Segmental dysfunction of pelvic region  Lumbago  Segmental dysfunction of lumbar region  Spasm of muscle  Thoracic segment dysfunction.     Since last visit on 3/21/2018,  Juanita Gonzalez reports the following changes: Pain immediately after last treatment: 0/10 and their pain level today 0/10.  Juanita reports that she has been feeling pretty good s  Area of chief complaint:steve last visit.  She does not have pain but she is feeling some pressure in her low back.  She reports that she feels like it need to 'crack'.      Lumbar :  Symptoms are graded at 0/10. The quality is described as stiff.  Motion has increased, but is still not normal. Patient feels that they are improved due to a reduction in symptoms.        Objective:  The following was observed:    P: palpatory tenderness, Piriformis R>>L    A: static palpation demonstrates intersegmental asymmetry , thoracic, lumbar, pelvis    R: motion palpation notes restricted motion, :  T10 Extension restriction  T11 Extension restriction  T12 Extension restriction  L4 Right rotation restricted  L5 Right rotation restricted  PSIS Right Extension restriction    T: hypertonicity at: Piriformis R>>L      Assessment:    Segmental spinal dysfunction/restrictions found at:  T10  T11  T12  L4  L5  PSIS Right    Diagnoses:      1. Segmental dysfunction of pelvic region    2. Lumbago    3. Segmental dysfunction of lumbar region    4. Spasm of muscle    5. Thoracic segment dysfunction        Patient's condition:  Patient had restrictions pre-manipulation    Treatment effectiveness:  Post manipulation there is better intersegmental movement and Patient claims to feel looser post manipulation      Procedures:  CMT:  42417 Chiropractic manipulative treatment 3-4 regions performed   Thoracic: Diversified, T10, T11, T12, Prone  Lumbar:  Activator, L4, L5, Prone  Pelvis: Drop Table, PSIS Right , Prone    Modalities:  36179: MSTM:  To Piriformis  for 5 min    Therapeutic procedures:  None      Prognosis: Good    Progress towards Goals: Patient is making progress towards the goal     Response to Treatment:   Reduction in symptoms as reported by patient      Recommendations:    Instructions:ice 20 minutes every other hour as needed    Follow-up:   Return to care in two weeks.

## 2018-04-10 ENCOUNTER — OFFICE VISIT (OUTPATIENT)
Dept: UROLOGY | Facility: CLINIC | Age: 23
End: 2018-04-10
Payer: COMMERCIAL

## 2018-04-10 VITALS — HEART RATE: 92 BPM | SYSTOLIC BLOOD PRESSURE: 127 MMHG | OXYGEN SATURATION: 98 % | DIASTOLIC BLOOD PRESSURE: 78 MMHG

## 2018-04-10 DIAGNOSIS — R35.0 URINARY FREQUENCY: ICD-10-CM

## 2018-04-10 DIAGNOSIS — N39.41 URGENCY INCONTINENCE: Primary | ICD-10-CM

## 2018-04-10 LAB
ALBUMIN UR-MCNC: NEGATIVE MG/DL
APPEARANCE UR: CLEAR
BILIRUB UR QL STRIP: NEGATIVE
COLOR UR AUTO: YELLOW
GLUCOSE UR STRIP-MCNC: NEGATIVE MG/DL
HGB UR QL STRIP: ABNORMAL
KETONES UR STRIP-MCNC: NEGATIVE MG/DL
LEUKOCYTE ESTERASE UR QL STRIP: NEGATIVE
NITRATE UR QL: NEGATIVE
NON-SQ EPI CELLS #/AREA URNS LPF: NORMAL /LPF
PH UR STRIP: 7 PH (ref 5–7)
RBC #/AREA URNS AUTO: NORMAL /HPF
SOURCE: ABNORMAL
SP GR UR STRIP: 1.02 (ref 1–1.03)
UROBILINOGEN UR STRIP-ACNC: 0.2 EU/DL (ref 0.2–1)
WBC #/AREA URNS AUTO: NORMAL /HPF

## 2018-04-10 PROCEDURE — 51798 US URINE CAPACITY MEASURE: CPT | Performed by: UROLOGY

## 2018-04-10 PROCEDURE — 81001 URINALYSIS AUTO W/SCOPE: CPT | Performed by: UROLOGY

## 2018-04-10 PROCEDURE — 99204 OFFICE O/P NEW MOD 45 MIN: CPT | Mod: 25 | Performed by: UROLOGY

## 2018-04-10 NOTE — PROGRESS NOTES
CC:    HPI:  Juanita Gonzalez is a 22 year old female who was seen in consultation  for urinary urgency.  This problem has been going on for about a year and has been getting worse.  It is associated with occasional urgency incontinence.  She has 0-1 episodes of incontinence per day .  She has  had no previous treatment for her condition in the past .  The patient voids q2 hours, nocturia X 0.  She drinks normally.  She denies any dysuria, nocturia, hematuria, pyuria, hesitency, intermittency, feeling of incomplete emptying, or any recent hx of UTI's or stones.    The patient has no constipation or splinting.  She is sexually  active and denies any dyspareunia or pelvic pain.   She denies any vaginal bulge. She has no Neurological or balance problems          Current Outpatient Prescriptions   Medication Sig Dispense Refill     amphetamine-dextroamphetamine (ADDERALL XR) 20 MG per 24 hr capsule Take 1 capsule (20 mg) by mouth daily 30 capsule 0     [START ON 5/2/2018] amphetamine-dextroamphetamine (ADDERALL XR) 20 MG per 24 hr capsule Take 1 capsule (20 mg) by mouth daily 30 capsule 0     amphetamine-dextroamphetamine (ADDERALL XR) 20 MG per 24 hr capsule Take 1 capsule (20 mg) by mouth daily 30 capsule 0     drospirenone-ethinyl estradiol (ARELIS 28) 3-0.03 MG per tablet Take 1 tablet by mouth daily DISPENSE 3 MONTH supply at a time please 90 tablet 3     Allergies   Allergen Reactions     Bactrim Hives     Past Medical History:   Diagnosis Date     Allergic conjunctivitis      Anisometropia      Asthma      Attention deficit disorder with hyperactivity(314.01)     stopped medication 1 year ago     Myopia      Nonorganic enuresis     voiding dysfunction      Obesity, unspecified      OCD (obsessive compulsive disorder)      Wears contact lenses      Past Surgical History:   Procedure Laterality Date     DENTAL SURGERY       GRAFT BONE TO FINGER  3/18/2013    Procedure: GRAFT BONE TO FINGER;  Right Second  Metacarpal Curettage and Allograft;  Surgeon: Jeremiah Yu MD;  Location: [facility]     HC TOOTH EXTRACTION W/FORCEP       LE FORT ONE N/A 6/11/2015    Procedure: LE FORT ONE;  Surgeon: Vitaliy Yun DDS;  Location: [facility]     LE FORT ONE , OSTEOTOMY SAGITTAL SPLIT, COMBINED N/A 12/3/2014    Procedure: COMBINED LE FORT ONE, OSTEOTOMY SAGITTAL SPLIT;  Surgeon: Vitaliy Yun DDS;  Location: [facility]     TONGUE SURGERY  9/17/2014    tongue lesion removed      Family History   Problem Relation Age of Onset     Asthma Mother      Obesity Father      Hypertension Maternal Grandmother      CANCER Maternal Grandfather      Hypertension Paternal Grandmother      Glaucoma Other      Mat Great Aunt/blindness     Glaucoma Maternal Aunt      Macular Degeneration No family hx of      Social History     Social History     Marital status: Single     Spouse name: N/A     Number of children: N/A     Years of education: N/A     Social History Main Topics     Smoking status: Never Smoker     Smokeless tobacco: Never Used     Alcohol use Yes      Comment: Occasional with friends.     Drug use: Yes      Comment: occas marijuana     Sexual activity: Yes     Partners: Male     Birth control/ protection: Condom      Comment: GC/ chlamydia sent 12/21/16.     Other Topics Concern     None     Social History Narrative    Lives with her mom. In college via [employer] technically a senior in high school       REVIEW OF SYSTEMS  =================  C: NEGATIVE for fever, chills, change in weight  I: NEGATIVE for worrisome rashes, moles or lesions  E/M: NEGATIVE for ear, mouth and throat problems  R: NEGATIVE for significant cough or SHORTNESS OF BREATH  CV:  NEGATIVE for chest pain, palpitations or peripheral edema  GI: NEGATIVE for nausea, abdominal pain, heartburn, or change in bowel habits  NEURO: NEGATIVE numbness/weakness  : see HPI  PSYCH: NEGATIVE depression/anxiety  LYmph: no new enlarged lymph nodes  Ortho: no new  trauma/movements      Physical Exam:  /78 (BP Location: Right arm, Patient Position: Chair, Cuff Size: Adult Large)  Pulse 92  SpO2 98%   Patient is pleasant, in no acute distress, good general condition.  HEENT:  Normalcephalic, atraumatic  Lung: no evidence of respiratory distress    Abdomen: Soft, nondistended, non tender. No masses. No rebound or guarding.  :  Normal external genitalia and introitus.     Neuro: grossly normal  Psych normal mood and affect  Musculoskeletal  moving all extremities    RU: minimal residual urine (42 ml)    UA dipstick: neg        Office Visit on 09/06/2017   Component Date Value Ref Range Status     Specimen Descrip 09/06/2017 Vagina   Final     N Gonorrhea PCR 09/06/2017 Negative  NEG^Negative Final    Comment: Negative for N. gonorrhoeae rRNA by transcription mediated amplification.  A negative result by transcription mediated amplification does not preclude   the presence of N. gonorrhoeae infection because results are dependent on   proper and adequate collection, absence of inhibitors, and sufficient rRNA to   be detected.       Specimen Description 09/06/2017 Vagina   Final     Chlamydia Trachomatis PCR 09/06/2017 Negative  NEG^Negative Final    Comment: Negative for C. trachomatis rRNA by transcription mediated amplification.  A negative result by transcription mediated amplification does not preclude   the presence of C. trachomatis infection because results are dependent on   proper and adequate collection, absence of inhibitors, and sufficient rRNA to   be detected.         IMAGING:    ASSESSMENT and PLAN:  This is a 22 year old female with OAB symptoms.  She has some weight gain this year.    Different management options were discussed with the patient including observation, Kegel exercises, behavior therapy biofeedback, PT, medication, PTNS, Botox, and Interstim.    Stop caffeine/alcohol/tea intake.    Referral made to physical therapy.    Discussed weight and  correlation to bladder symptoms.    Christopher Sosa MD

## 2018-04-10 NOTE — MR AVS SNAPSHOT
"              After Visit Summary   4/10/2018    Juanita Gonzalez    MRN: [id number]           Patient Information     Date Of Birth          1995        Visit Information        Provider Department      4/10/2018 3:15 PM Christopher Sosa MD [facility]        Today's Diagnoses     Urgency incontinence    -  1    Urinary frequency           Follow-ups after your visit        Additional Services     PHYSICAL THERAPY REFERRAL       *This therapy referral will be filtered to a centralized scheduling office at [facility] and the patient will receive a call to schedule an appointment at a [facility] location most convenient for them. *     [facility] provides Physical Therapy evaluation and treatment and many specialty services across the [facility] system.  If requesting a specialty program, please choose from the list below.    If you have not heard from the scheduling office within 2 business days, please call [phone] for all locations, with the exception of [place], please call [phone] and [facility], please call [phone]  Treatment: Evaluation & Treatment  Special Instructions/Modalities: Incontinence  Special Programs: Incontinence Pelvic Floor Program    Please be aware that coverage of these services is subject to the terms and limitations of your health insurance plan.  Call member services at your health plan with any benefit or coverage questions.      **Note to Provider:  If you are referring outside of [facility] for the therapy appointment, please list the name of the location in the \"special instructions\" above, print the referral and give to the patient to schedule the appointment.                  Your next 10 appointments already scheduled     Apr 11, 2018  2:30 PM CDT   [facility] Chiropractor with JELLY Dahl (NAKUL Velasco)    [id number]  [place] [id number] "   [phone]              Who to contact     If you have questions or need follow up information about today's clinic visit or your schedule please contact [facility] C[facility] directly at [phone].  Normal or non-critical lab and imaging results will be communicated to you by MyChart, letter or phone within 4 business days after the clinic has received the results. If you do not hear from us within 7 days, please contact the clinic through MyChart or phone. If you have a critical or abnormal lab result, we will notify you by phone as soon as possible.  Submit refill requests through [employer] or call your pharmacy and they will forward the refill request to us. Please allow 3 business days for your refill to be completed.          Additional Information About Your Visit        [employer] Information     [employer] gives you secure access to your electronic health record. If you see a primary care provider, you can also send messages to your care team and make appointments. If you have questions, please call your primary care clinic.  If you do not have a primary care provider, please call [phone] and they will assist you.        Care EveryWhere ID     This is your Care EveryWhere ID. This could be used by other organizations to access your [place] medical records  [id number]        Your Vitals Were     Pulse Pulse Oximetry                92 98%           Blood Pressure from Last 3 Encounters:   04/10/18 127/78   02/27/18 120/75   09/06/17 117/69    Weight from Last 3 Encounters:   02/27/18 116.2 kg (256 lb 3.2 oz)   09/06/17 114 kg (251 lb 6.4 oz) (>99 %)*   05/23/17 116.2 kg (256 lb 3.2 oz) (>99 %)*     * Growth percentiles are based on CDC 2-20 Years data.              We Performed the Following     MEASURE POST-VOID RESIDUAL URINE/BLADDER CAPACITY, US NON-IMAGING     PHYSICAL THERAPY REFERRAL     UA reflex to Microscopic and Culture     Urine Microscopic        Primary Care Provider Office Phone # Fax  #    Leticiai MD Monty [phone]       [address] [address]        Equal Access to Services     JASON GARRISON : Lisa Parker, juju fortune, amanda jimenesmajarod bar, waxnelia rinkuin hayaadawood butterfieldtheresa couch shanelle rendon. So [facility] [phone].    ATENCIÓN: Si habla español, tiene a braswell disposición servicios gratuitos de asistencia lingüística. Llame al [phone].    We comply with applicable federal civil rights laws and [place] laws. We do not discriminate on the basis of race, color, national origin, age, disability, sex, sexual orientation, or gender identity.            Thank you!     Thank you for choosing [facility]  for your care. Our goal is always to provide you with excellent care. Hearing back from our patients is one way we can continue to improve our services. Please take a few minutes to complete the written survey that you may receive in the mail after your visit with us. Thank you!             Your Updated Medication List - Protect others around you: Learn how to safely use, store and throw away your medicines at www.disposemymeds.org.          This list is accurate as of 4/10/18  4:25 PM.  Always use your most recent med list.                   Brand Name Dispense Instructions for use Diagnosis    * amphetamine-dextroamphetamine 20 MG per 24 hr capsule    ADDERALL XR    30 capsule    Take 1 capsule (20 mg) by mouth daily    Attention deficit hyperactivity disorder (ADHD), predominantly inattentive type       * amphetamine-dextroamphetamine 20 MG per 24 hr capsule    ADDERALL XR    30 capsule    Take 1 capsule (20 mg) by mouth daily    Attention deficit hyperactivity disorder (ADHD), predominantly inattentive type       * amphetamine-dextroamphetamine 20 MG per 24 hr capsule   Start taking on:  5/2/2018    ADDERALL XR    30 capsule    Take 1 capsule (20 mg) by mouth daily    Attention deficit hyperactivity disorder (ADHD), predominantly  inattentive type       drospirenone-ethinyl estradiol 3-0.03 MG per tablet    ARELIS 28    90 tablet    Take 1 tablet by mouth daily DISPENSE 3 MONTH supply at a time please    Acne vulgaris       * Notice:  This list has 3 medication(s) that are the same as other medications prescribed for you. Read the directions carefully, and ask your doctor or other care provider to review them with you.

## 2018-04-11 ENCOUNTER — THERAPY VISIT (OUTPATIENT)
Dept: CHIROPRACTIC MEDICINE | Facility: CLINIC | Age: 23
End: 2018-04-11
Payer: COMMERCIAL

## 2018-04-11 DIAGNOSIS — M62.838 SPASM OF MUSCLE: ICD-10-CM

## 2018-04-11 DIAGNOSIS — M99.03 SEGMENTAL DYSFUNCTION OF LUMBAR REGION: ICD-10-CM

## 2018-04-11 DIAGNOSIS — M54.6 PAIN IN THORACIC SPINE: ICD-10-CM

## 2018-04-11 DIAGNOSIS — M99.02 THORACIC SEGMENT DYSFUNCTION: Primary | ICD-10-CM

## 2018-04-11 DIAGNOSIS — M99.05 SEGMENTAL DYSFUNCTION OF PELVIC REGION: ICD-10-CM

## 2018-04-11 PROCEDURE — 98941 CHIROPRACT MANJ 3-4 REGIONS: CPT | Mod: AT | Performed by: CHIROPRACTOR

## 2018-04-11 NOTE — PROGRESS NOTES
Visit #:  5 of 6, based on treatment plan    Subjective:  Juanita Gonzalez is a 22 year old female who is seen in f/u up for:        Segmental dysfunction of pelvic region  Lumbago  Segmental dysfunction of lumbar region  Spasm of muscle  Thoracic segment dysfunction.     Since last visit on 3/21/2018,  Juanita Gonzalez reports the following changes: Pain immediately after last treatment: 0/10 and their pain level today 0/10.  Juanita reports that she has been feeling pretty good.  She is feeling some muscle tension I her upper back and her lower back is feeling pretty good.      Thoracic :  Symptoms are graded at 1/10. The quality is described as stiff.  Motion has increased, but is still not normal. Patient feels that they are improved due to a reduction in symptoms.        Objective:  The following was observed:    P: palpatory tenderness, Piriformis R>>L    A: static palpation demonstrates intersegmental asymmetry , thoracic, lumbar, pelvis    R: motion palpation notes restricted motion, :  T3 Extension restriction  T4 Extension restriction  T5 Extension restriction  L4 Right rotation restricted  L5 Right rotation restricted  PSIS Right Extension restriction    T: hypertonicity at: Piriformis R>>L      Assessment:    Segmental spinal dysfunction/restrictions found at:  T3  T4  T5  L4  L5  PSIS Right    Diagnoses:      1. Segmental dysfunction of pelvic region    2. Lumbago    3. Segmental dysfunction of lumbar region    4. Spasm of muscle    5. Thoracic segment dysfunction        Patient's condition:  Patient had restrictions pre-manipulation    Treatment effectiveness:  Post manipulation there is better intersegmental movement and Patient claims to feel looser post manipulation      Procedures:  CMT:  59002 Chiropractic manipulative treatment 3-4 regions performed   Thoracic: Diversified, T10, T11, T12, Prone  Lumbar: Activator, L4, L5, Prone  Pelvis: Drop Table, PSIS Right ,  Prone    Modalities:  82386: MSTM:  To traps  for 5 min    Therapeutic procedures:  None      Prognosis: Good    Progress towards Goals: Patient is making progress towards the goal     Response to Treatment:   Reduction in symptoms as reported by patient      Recommendations:    Instructions:ice 20 minutes every other hour as needed    Follow-up:   Return to care in two weeks.

## 2018-04-25 ENCOUNTER — THERAPY VISIT (OUTPATIENT)
Dept: CHIROPRACTIC MEDICINE | Facility: CLINIC | Age: 23
End: 2018-04-25
Payer: COMMERCIAL

## 2018-04-25 DIAGNOSIS — M99.05 SEGMENTAL DYSFUNCTION OF PELVIC REGION: ICD-10-CM

## 2018-04-25 DIAGNOSIS — M54.50 LUMBAGO: ICD-10-CM

## 2018-04-25 DIAGNOSIS — M62.838 SPASM OF MUSCLE: ICD-10-CM

## 2018-04-25 DIAGNOSIS — M99.03 SEGMENTAL DYSFUNCTION OF LUMBAR REGION: ICD-10-CM

## 2018-04-25 DIAGNOSIS — M54.6 PAIN IN THORACIC SPINE: ICD-10-CM

## 2018-04-25 DIAGNOSIS — M99.02 THORACIC SEGMENT DYSFUNCTION: Primary | ICD-10-CM

## 2018-04-25 PROCEDURE — 98941 CHIROPRACT MANJ 3-4 REGIONS: CPT | Mod: AT | Performed by: CHIROPRACTOR

## 2018-04-25 NOTE — PROGRESS NOTES
Visit #:  6 of 6, based on treatment plan    Subjective:  Juanita Gonzalez is a 22 year old female who is seen in f/u up for:        Segmental dysfunction of pelvic region  Lumbago  Segmental dysfunction of lumbar region  Spasm of muscle  Thoracic segment dysfunction.     Since last visit on 4/11/2018,  Juanita Gonzalez reports the following changes: Pain immediately after last treatment: 0/10 and their pain level today 4/10.  Juanita reports that she has been feeling pretty good until this past couple of days and they her back began to get stiff and sore.  She has been traveling for the past week in [place] and has been doing a lot of sitting and sleeping in different beds.  She feels this may be contributing to her symptoms.      Thoracic :  Symptoms are graded at 1/10. The quality is described as stiff.  Motion has increased, but is still not normal. Patient feels that they are improved due to a reduction in symptoms.        Objective:  The following was observed:    P: palpatory tenderness, Piriformis R>>L    A: static palpation demonstrates intersegmental asymmetry , thoracic, lumbar, pelvis    R: motion palpation notes restricted motion, :  T3 Extension restriction  T4 Extension restriction  T5 Extension restriction  L4 Right rotation restricted  L5 Right rotation restricted  PSIS Right Extension restriction    T: hypertonicity at: Piriformis R>>L      Assessment:    Segmental spinal dysfunction/restrictions found at:  T3  T4  T5  L4  L5  PSIS Right    Diagnoses:      1. Segmental dysfunction of pelvic region    2. Lumbago    3. Segmental dysfunction of lumbar region    4. Spasm of muscle    5. Thoracic segment dysfunction        Patient's condition:  Patient had restrictions pre-manipulation    Treatment effectiveness:  Post manipulation there is better intersegmental movement and Patient claims to feel looser post manipulation      Procedures:  CMT:  85459 Chiropractic manipulative treatment 3-4  regions performed   Thoracic: Diversified, T10, T11, T12, Prone  Lumbar: Activator, L4, L5, Prone  Pelvis: Drop Table, PSIS Right , Prone    Modalities:  75556: MSTM:  To traps  for 5 min    Therapeutic procedures:  None      Prognosis: Good    Progress towards Goals: Patient is making progress towards the goal     Response to Treatment:   Reduction in symptoms as reported by patient      Recommendations:    Instructions:ice 20 minutes every other hour as needed    Follow-up:   Return to care in one week.

## 2018-05-02 ENCOUNTER — THERAPY VISIT (OUTPATIENT)
Dept: CHIROPRACTIC MEDICINE | Facility: CLINIC | Age: 23
End: 2018-05-02
Payer: COMMERCIAL

## 2018-05-02 DIAGNOSIS — M54.50 LUMBAGO: ICD-10-CM

## 2018-05-02 DIAGNOSIS — M99.05 SEGMENTAL DYSFUNCTION OF PELVIC REGION: Primary | ICD-10-CM

## 2018-05-02 DIAGNOSIS — M62.838 SPASM OF MUSCLE: ICD-10-CM

## 2018-05-02 DIAGNOSIS — M99.03 SEGMENTAL DYSFUNCTION OF LUMBAR REGION: ICD-10-CM

## 2018-05-02 DIAGNOSIS — M99.02 THORACIC SEGMENT DYSFUNCTION: ICD-10-CM

## 2018-05-02 PROCEDURE — 98941 CHIROPRACT MANJ 3-4 REGIONS: CPT | Mod: AT | Performed by: CHIROPRACTOR

## 2018-05-02 NOTE — PROGRESS NOTES
Visit #:  7    Subjective:  Juanita Gonzalez is a 22 year old female who is seen in f/u up for:        Segmental dysfunction of pelvic region  Lumbago  Segmental dysfunction of lumbar region  Spasm of muscle  Thoracic segment dysfunction.     Since last visit on 4/25/2018,  Juanita Gonzalez reports the following changes: Pain immediately after last treatment: 0/10 and their pain level today 4/10.  Juanita reports that she has been feeling pretty good until this past couple of days and then her back began to get stiff and sore.  She is feeling some tightness in her upper thoracic region on the right.  Her low back is a little stiff and sore.    Thoracic :  Symptoms are graded at 4/10. The quality is described as stiff.  Motion has increased, but is still not normal. Patient feels that they are improved due to a reduction in symptoms.        Objective:  The following was observed:    P: palpatory tenderness, Piriformis R>>L    A: static palpation demonstrates intersegmental asymmetry , thoracic, lumbar, pelvis    R: motion palpation notes restricted motion, :  T3 Extension restriction  T4 Extension restriction  T5 Extension restriction  L4 Right rotation restricted  L5 Right rotation restricted  PSIS Right Extension restriction    T: hypertonicity at: Piriformis R>>L      Assessment:    Segmental spinal dysfunction/restrictions found at:  T3  T4  T5  L4  L5  PSIS Right    Diagnoses:      1. Segmental dysfunction of pelvic region    2. Lumbago    3. Segmental dysfunction of lumbar region    4. Spasm of muscle    5. Thoracic segment dysfunction        Patient's condition:  Patient had restrictions pre-manipulation    Treatment effectiveness:  Post manipulation there is better intersegmental movement and Patient claims to feel looser post manipulation      Procedures:  CMT:  43957 Chiropractic manipulative treatment 3-4 regions performed   Thoracic: Diversified, T10, T11, T12, Prone  Lumbar: Activator, L4, L5,  Prone  Pelvis: Drop Table, PSIS Right , Prone    Modalities:  34735: MSTM:  To traps  for 5 min    Therapeutic procedures:  None      Prognosis: Good    Progress towards Goals: Patient is making progress towards the goal     Response to Treatment:   Reduction in symptoms as reported by patient      Recommendations:    Instructions:ice 20 minutes every other hour as needed    Follow-up:   Return to care in one week.

## 2018-05-16 ENCOUNTER — THERAPY VISIT (OUTPATIENT)
Dept: CHIROPRACTIC MEDICINE | Facility: CLINIC | Age: 23
End: 2018-05-16
Payer: COMMERCIAL

## 2018-05-16 DIAGNOSIS — M62.838 SPASM OF MUSCLE: ICD-10-CM

## 2018-05-16 DIAGNOSIS — M99.02 THORACIC SEGMENT DYSFUNCTION: Primary | ICD-10-CM

## 2018-05-16 PROCEDURE — 98940 CHIROPRACT MANJ 1-2 REGIONS: CPT | Mod: AT | Performed by: CHIROPRACTOR

## 2018-05-16 NOTE — PROGRESS NOTES
Visit #:  7    Subjective:  Juanita Gonzalez is a 22 year old female who is seen in f/u up for:        Segmental dysfunction of pelvic region  Lumbago  Segmental dysfunction of lumbar region  Spasm of muscle  Thoracic segment dysfunction.     Since last visit on 5/2/2018,  Juanita Gonzalez reports the following changes: Pain immediately after last treatment: 0/10 and their pain level today 0/10.  Juanita reports that she has been feeling pretty good since last visit and is not having any pain.  She is slightly stiff but that is minimal.  Overall she is feeling very good.    Thoracic :  Symptoms are graded at 0/10. The quality is described as stiff at times.  Motion has increased, and has been consistent since last visit. Patient feels that they are improved due to a reduction in symptoms.        Objective:  The following was observed:    P: palpatory tenderness, T-spine para-spinal R>>L    A: static palpation demonstrates intersegmental asymmetry , thoracic, lumbar, pelvis    R: motion palpation notes restricted motion, :  T3 Extension restriction  T4 Extension restriction  T5 Extension restriction        T: hypertonicity at: T-Spine paraspinal R>>L      Assessment:    Segmental spinal dysfunction/restrictions found at:  T3  T4  T5    Diagnoses:      1. Segmental dysfunction of thoracic region    2. Spasm of muscle                   Patient's condition:  Patient had restrictions pre-manipulation    Treatment effectiveness:  Post manipulation there is better intersegmental movement and Patient claims to feel looser post manipulation      Procedures:  CMT:  71082 Chiropractic manipulative treatment 1-2 regions performed   Thoracic: Diversified, T3, T4, T5, Prone      Modalities:  none    Therapeutic procedures:  None      Prognosis: Good    Progress towards Goals: Patient is making progress towards the goal     Response to Treatment:   Reduction in symptoms as reported by  patient      Recommendations:    Instructions:ice 20 minutes every other hour as needed    Follow-up:   Return to care in one week.if symptoms persist

## 2018-05-25 ENCOUNTER — TELEPHONE (OUTPATIENT)
Dept: PEDIATRICS | Facility: CLINIC | Age: 23
End: 2018-05-25

## 2018-05-25 NOTE — TELEPHONE ENCOUNTER
Reason for Call:  Medication or medication refill: Adderall 20mg, 1 tablet daily        Do you use a [facility]?  Name of the pharmacy and phone number for the current request:  [facility]    Name of the medication requested: Adderall    Other request: patient states she has not yet established with another provider but intends to as discussed at her last office visit in February.  Hoping Dr Millan can refill one additional month until she establishes care elsewhere    Can we leave a detailed message on this number? YES    Phone number patient can be reached at: Home number on file [phone]  Best Time: any    Call taken on 5/25/2018 at 4:22 PM by Allyson Dexter

## 2018-05-25 NOTE — TELEPHONE ENCOUNTER
Dr. Millan, please advise. I called Juanita to inform her that you would not be in the clinic until Tuesday.  Jia Middleton RN

## 2018-05-29 NOTE — TELEPHONE ENCOUNTER
Millan, Nimi, MD   [facility] R[facility] Triage 3 minutes ago (1:18 PM)                 Please call patient and inform her that she needs to get this Rx from her new doctor.  Thanks.     Jose Luis (Routing comment)               Relayed this info to Juanita.  Juanita Mcfarland, RN

## 2018-06-05 ENCOUNTER — THERAPY VISIT (OUTPATIENT)
Dept: CHIROPRACTIC MEDICINE | Facility: CLINIC | Age: 23
End: 2018-06-05
Payer: COMMERCIAL

## 2018-06-05 DIAGNOSIS — M62.838 SPASM OF MUSCLE: ICD-10-CM

## 2018-06-05 DIAGNOSIS — M99.02 THORACIC SEGMENT DYSFUNCTION: Primary | ICD-10-CM

## 2018-06-05 DIAGNOSIS — M54.6 PAIN IN THORACIC SPINE: ICD-10-CM

## 2018-06-05 PROCEDURE — 98940 CHIROPRACT MANJ 1-2 REGIONS: CPT | Mod: AT | Performed by: CHIROPRACTOR

## 2018-06-05 NOTE — PROGRESS NOTES
Visit #:  8    Subjective:  Juanita Gonzalez is a 22 year old female who is seen in f/u up for:        Segmental dysfunction of pelvic region  Lumbago  Segmental dysfunction of lumbar region  Spasm of muscle  Thoracic segment dysfunction.     Since last visit on 5/16/2018,  Juanita Gonzalez reports the following changes: Pain immediately after last treatment: 0/10 and their pain level today 6/10.  Juanita reports that she has been feeling pretty good until last week when the pain and stiffness came back.  There was no trauma and she says that she did not do anything in particular to cause the pain.  She feels sleeping may be an issue.  She is also under a lot more stress as she started college.     Thoracic :  Symptoms are graded at 6/10. The quality is described as stiff at times.  Motion has increased, and has been consistent since last visit. Patient feels that they are improved due to a reduction in symptoms.        Objective:  The following was observed:    P: palpatory tenderness, T-spine para-spinal R>>L    A: static palpation demonstrates intersegmental asymmetry , thoracic, lumbar, pelvis    R: motion palpation notes restricted motion, :  T3 Extension restriction  T4 Extension restriction  T5 Extension restriction        T: hypertonicity at: T-Spine paraspinal R>>L      Assessment:    Segmental spinal dysfunction/restrictions found at:  T3  T4  T5    Diagnoses:      1. Segmental dysfunction of thoracic region    2. Spasm of muscle                   Patient's condition:  Patient had restrictions pre-manipulation    Treatment effectiveness:  Post manipulation there is better intersegmental movement and Patient claims to feel looser post manipulation      Procedures:  CMT:  03006 Chiropractic manipulative treatment 1-2 regions performed   Thoracic: Diversified, T3, T4, T5, Prone      Modalities:  none    Therapeutic procedures:  None      Prognosis: Good    Progress towards Goals: Patient is making  progress towards the goal     Response to Treatment:   Reduction in symptoms as reported by patient      Recommendations:    Instructions:ice 20 minutes every other hour as needed    Follow-up:   Return to care in one week.if symptoms persist

## 2018-06-14 ENCOUNTER — THERAPY VISIT (OUTPATIENT)
Dept: CHIROPRACTIC MEDICINE | Facility: CLINIC | Age: 23
End: 2018-06-14
Payer: COMMERCIAL

## 2018-06-14 DIAGNOSIS — M99.02 THORACIC SEGMENT DYSFUNCTION: Primary | ICD-10-CM

## 2018-06-14 DIAGNOSIS — M62.838 SPASM OF MUSCLE: ICD-10-CM

## 2018-06-14 DIAGNOSIS — M54.6 PAIN IN THORACIC SPINE: ICD-10-CM

## 2018-06-14 PROCEDURE — 98940 CHIROPRACT MANJ 1-2 REGIONS: CPT | Mod: AT | Performed by: CHIROPRACTOR

## 2018-06-14 NOTE — PROGRESS NOTES
Visit #:  9    Subjective:  Juanita Gonzalez is a 22 year old female who is seen in f/u up for:        Segmental dysfunction of pelvic region  Lumbago  Segmental dysfunction of lumbar region  Spasm of muscle  Thoracic segment dysfunction.     Since last visit on 6/5/2018,  Juanita Gonzalez reports the following changes: Pain immediately after last treatment: 0/10 and their pain level today 6/10.  Juanita reports that she has been feeling pretty good after last visit until today, when she got very stiff and locked up in her thoracic spine.  This could be related to posture and studying in a hunched over position.    Thoracic :  Symptoms are graded at 6/10. The quality is described as stiff at times.  Motion has increased, and has been consistent since last visit. Patient feels that they are improved due to a reduction in symptoms.        Objective:  The following was observed:    P: palpatory tenderness, T-spine para-spinal R>>L    A: static palpation demonstrates intersegmental asymmetry , thoracic, lumbar, pelvis    R: motion palpation notes restricted motion, :  T3 Extension restriction  T4 Extension restriction  T5 Extension restriction        T: hypertonicity at: T-Spine paraspinal R>>L      Assessment:    Segmental spinal dysfunction/restrictions found at:  T3  T4  T5    Diagnoses:      1. Segmental dysfunction of thoracic region    2. Spasm of muscle                   Patient's condition:  Patient had restrictions pre-manipulation    Treatment effectiveness:  Post manipulation there is better intersegmental movement and Patient claims to feel looser post manipulation      Procedures:  CMT:  10833 Chiropractic manipulative treatment 1-2 regions performed   Thoracic: Diversified, T3, T4, T5, Prone      Modalities:  none    Therapeutic procedures:  None      Prognosis: Good    Progress towards Goals: Patient is making progress towards the goal     Response to Treatment:   Reduction in symptoms as  reported by patient      Recommendations:    Instructions:ice 20 minutes every other hour as needed    Follow-up:   Return to care in one week.if symptoms persist

## 2018-06-21 ENCOUNTER — THERAPY VISIT (OUTPATIENT)
Dept: CHIROPRACTIC MEDICINE | Facility: CLINIC | Age: 23
End: 2018-06-21
Payer: COMMERCIAL

## 2018-06-21 DIAGNOSIS — M54.6 PAIN IN THORACIC SPINE: ICD-10-CM

## 2018-06-21 DIAGNOSIS — M99.02 THORACIC SEGMENT DYSFUNCTION: Primary | ICD-10-CM

## 2018-06-21 DIAGNOSIS — M62.838 SPASM OF MUSCLE: ICD-10-CM

## 2018-06-21 PROCEDURE — 98940 CHIROPRACT MANJ 1-2 REGIONS: CPT | Mod: AT | Performed by: CHIROPRACTOR

## 2018-06-21 NOTE — PROGRESS NOTES
Visit #:  10    Subjective:  Juanita Gonzalez is a 22 year old female who is seen in f/u up for:        Segmental dysfunction of pelvic region  Lumbago  Segmental dysfunction of lumbar region  Spasm of muscle  Thoracic segment dysfunction.     Since last visit on 6/14/2018,  Juanita Gonzalez reports the following changes: Pain immediately after last treatment: 0/10 and their pain level today 6/10.  Juanita reports that she has been feeling pretty good but is still having some pain in her mid thoracic region.  She thinks this is from studying and sitting in her car.      Thoracic :  Symptoms are graded at 6/10. The quality is described as stiff at times.  Motion has increased, and has been consistent since last visit. Patient feels that they are improved due to a reduction in symptoms.        Objective:  The following was observed:    P: palpatory tenderness, T-spine para-spinal R>>L    A: static palpation demonstrates intersegmental asymmetry , thoracic, lumbar, pelvis    R: motion palpation notes restricted motion, :  T3 Extension restriction  T4 Extension restriction  T5 Extension restriction        T: hypertonicity at: T-Spine paraspinal R>>L      Assessment:    Segmental spinal dysfunction/restrictions found at:  T3  T4  T5    Diagnoses:      1. Segmental dysfunction of thoracic region    2. Spasm of muscle                   Patient's condition:  Patient had restrictions pre-manipulation    Treatment effectiveness:  Post manipulation there is better intersegmental movement and Patient claims to feel looser post manipulation      Procedures:  CMT:  26842 Chiropractic manipulative treatment 1-2 regions performed   Thoracic: Diversified, T3, T4, T5, Prone      Modalities:  none    Therapeutic procedures:  None      Prognosis: Good    Progress towards Goals: Patient is making progress towards the goal     Response to Treatment:   Reduction in symptoms as reported by  patient      Recommendations:    Instructions:ice 20 minutes every other hour as needed    Follow-up:   Return to care in one week.if symptoms persist

## 2018-06-28 ENCOUNTER — THERAPY VISIT (OUTPATIENT)
Dept: CHIROPRACTIC MEDICINE | Facility: CLINIC | Age: 23
End: 2018-06-28
Payer: COMMERCIAL

## 2018-06-28 DIAGNOSIS — M54.6 PAIN IN THORACIC SPINE: ICD-10-CM

## 2018-06-28 DIAGNOSIS — M62.838 SPASM OF MUSCLE: ICD-10-CM

## 2018-06-28 DIAGNOSIS — M99.02 THORACIC SEGMENT DYSFUNCTION: Primary | ICD-10-CM

## 2018-06-28 PROCEDURE — 98940 CHIROPRACT MANJ 1-2 REGIONS: CPT | Mod: AT | Performed by: CHIROPRACTOR

## 2018-06-28 NOTE — PROGRESS NOTES
Visit #:  11    Subjective:  Juanita Gonzalez is a 22 year old female who is seen in f/u up for:        Segmental dysfunction of pelvic region  Lumbago  Segmental dysfunction of lumbar region  Spasm of muscle  Thoracic segment dysfunction.     Since last visit on 6/21/2018,  Juanita Gonzalez reports the following changes: Pain immediately after last treatment: 0/10 and their pain level today 2/10.  Juanita reports that she has been feeling better.  She has been focusing on standing up straight and watching out for her posture.  She has been in less pain and is able to do more.      Thoracic :  Symptoms are graded at 210. The quality is described as stiff at times.  Motion has increased, and has been consistent since last visit. Patient feels that they are improved due to a reduction in symptoms.        Objective:  The following was observed:    P: palpatory tenderness, T-spine para-spinal R>>L    A: static palpation demonstrates intersegmental asymmetry , thoracic, lumbar, pelvis    R: motion palpation notes restricted motion, :  T3 Extension restriction  T4 Extension restriction  T5 Extension restriction        T: hypertonicity at: T-Spine paraspinal R>>L      Assessment:    Segmental spinal dysfunction/restrictions found at:  T3  T4  T5    Diagnoses:      1. Segmental dysfunction of thoracic region    2. Spasm of muscle                   Patient's condition:  Patient had restrictions pre-manipulation    Treatment effectiveness:  Post manipulation there is better intersegmental movement and Patient claims to feel looser post manipulation      Procedures:  CMT:  77940 Chiropractic manipulative treatment 1-2 regions performed   Thoracic: Diversified, T3, T4, T5, Prone      Modalities:  none    Therapeutic procedures:  None      Prognosis: Good    Progress towards Goals: Patient is making progress towards the goal     Response to Treatment:   Reduction in symptoms as reported by  patient      Recommendations:    Instructions:ice 20 minutes every other hour as needed    Follow-up:   Return to care in one week.if symptoms persist

## 2018-07-12 ENCOUNTER — THERAPY VISIT (OUTPATIENT)
Dept: CHIROPRACTIC MEDICINE | Facility: CLINIC | Age: 23
End: 2018-07-12
Payer: COMMERCIAL

## 2018-07-12 DIAGNOSIS — M99.02 THORACIC SEGMENT DYSFUNCTION: Primary | ICD-10-CM

## 2018-07-12 DIAGNOSIS — M54.6 PAIN IN THORACIC SPINE: ICD-10-CM

## 2018-07-12 DIAGNOSIS — M62.838 SPASM OF MUSCLE: ICD-10-CM

## 2018-07-12 PROCEDURE — 98940 CHIROPRACT MANJ 1-2 REGIONS: CPT | Mod: AT | Performed by: CHIROPRACTOR

## 2018-07-12 NOTE — PROGRESS NOTES
Visit #:  12    Subjective:  Juanita Gonzalez is a 22 year old female who is seen in f/u up for:        Segmental dysfunction of pelvic region  Lumbago  Segmental dysfunction of lumbar region  Spasm of muscle  Thoracic segment dysfunction.     Since last visit on 6/28/2018,  Juanita Gonzalez reports the following changes: Pain immediately after last treatment: 0/10 and their pain level today 2/10.  Juanita reports that she has been feeling better.  She does report that she is having some muscular pain in between her shoulders.  She get relief but then the pain returns a few days later.    Thoracic :  Symptoms are graded at 2/0. The quality is described as stiff at times.  Motion has increased, and has been consistent since last visit. Patient feels that they are improved due to a reduction in symptoms.        Objective:  The following was observed:    P: palpatory tenderness, T-spine para-spinal R>>L    A: static palpation demonstrates intersegmental asymmetry , thoracic, lumbar, pelvis    R: motion palpation notes restricted motion, :  T3 Extension restriction  T4 Extension restriction  T5 Extension restriction        T: hypertonicity at: T-Spine paraspinal R>>L      Assessment:    Segmental spinal dysfunction/restrictions found at:  T3  T4  T5    Diagnoses:      1. Segmental dysfunction of thoracic region    2. Spasm of muscle                   Patient's condition:  Patient had restrictions pre-manipulation    Treatment effectiveness:  Post manipulation there is better intersegmental movement and Patient claims to feel looser post manipulation      Procedures:  CMT:  86868 Chiropractic manipulative treatment 1-2 regions performed   Thoracic: Diversified, T3, T4, T5, Prone      Modalities:  none    Therapeutic procedures:  None      Prognosis: Good    Progress towards Goals: Patient is making progress towards the goal     Response to Treatment:   Reduction in symptoms as reported by  patient      Recommendations:    Instructions:ice 20 minutes every other hour as needed    Follow-up:   Return to care in two weeks  Follow up with PT

## 2018-07-19 ENCOUNTER — THERAPY VISIT (OUTPATIENT)
Dept: PHYSICAL THERAPY | Facility: CLINIC | Age: 23
End: 2018-07-19
Payer: COMMERCIAL

## 2018-07-19 DIAGNOSIS — M54.6 BILATERAL THORACIC BACK PAIN: Primary | ICD-10-CM

## 2018-07-19 DIAGNOSIS — M54.2 NECK PAIN: ICD-10-CM

## 2018-07-19 PROCEDURE — 97161 PT EVAL LOW COMPLEX 20 MIN: CPT | Mod: GP | Performed by: PHYSICAL THERAPIST

## 2018-07-19 PROCEDURE — 97110 THERAPEUTIC EXERCISES: CPT | Mod: GP | Performed by: PHYSICAL THERAPIST

## 2018-07-19 PROCEDURE — 97112 NEUROMUSCULAR REEDUCATION: CPT | Mod: GP | Performed by: PHYSICAL THERAPIST

## 2018-07-19 NOTE — PROGRESS NOTES
[facility] Initial Evaluation -- Thoracic    Evaluation Date: July 19, 2018  Juanita Gonzalez is a 22 year old female with a thoracic spine condition.   Referral: Ajit Pinto DC  Work mechanical stresses:  - Standing, lifting, pushing/pulling  Employment status:  Working normal hours  Leisure mechanical stresses: No formal exercise  VAS score (0-10): 3/10  Patient goals/expectations:  Reduce pain and stiffness in neck/upper back    HISTORY:    Present symptoms: (B) mid thoracic/scapular region, (B) lower neck  Pain quality (sharp/shooting/stabbing/aching/burning/cramping):  Achy, tight  Paresthesia (yes/no):  No    Present since (onset date): March 2018. Symptoms (improving/unchanging/worsening):  unchanging.  Symptoms commenced as a result of: Bending forward to stretch   Condition occurred in the following environment: Home     Symptoms at onset: As above  Constant symptoms: None  Intermittent symptoms: As above    Symptoms are made worse with the following: Always Bending, Always Sitting, Always Rising, Always Turning trunk, Always Lying and Time of day - Always PM   Symptoms are made better with the following: Always On the move    Disturbed sleep (yes/no):  Yes, 1-2 x night    Pillows:  2  Sleeping postures(prone/sup/side R/L): Sides, back    Previous episodes (0/1-5/6-10/11+):  0  Year of first episode: NA    Previous history: No  Previous treatments: Chiro - Initially relieved but now plateaued    Specific Questions:   Cough/Sneeze/Deep Breath (pos/neg):  Neg  Gait (normal/abnormal):  Normal  Medications (nil/NSAIDS/analg/steroids/anticoag/other):  Other - Birth control  Medical allergies:  Bactrim  General health (excellent/good/fair/poor):  Good  Pertinent medical history:  Asthma, Overweight and Incontinence  Imaging (NA/Xray/MRI):  None  Recent or major surgery (yes/no):  Hand  Night pain (yes/no):  No  Accidents (yes/no):  No  Unexplained weight loss (yes/no):   No  Barriers at home:  None  Other red flags:  Incontinence - Bed wetter as child but still has increased frequency and urgency during day.  MD aware and states weakness in pelvic floor due to being overweight.    EXAMINATION    Posture:   Sitting (good/fair/poor): Fair   Standing (good/fair/poor): Fair  Protruded head (yes/no): No  Kyphosis (red/acc/normal): Acc    Correction of posture (better/worse/no effect): Better  Other observations:  NT    Neurological:    Motor deficit:  NT    Reflexes:  NT  Sensory deficit:  NT    Dural signs:  NT    Movement Loss:   Anderson Mod Min Nil Pain   Flexion   X  Tight   Extension  X   (B) rib cage   Rotation R   X  (R) thoracic   Rotation L   X  (L) thoracic   Other: Cervical         Ext - Min loss  Rotation - Rt WNL, Lt Min loss  SB - Rt WNL, Lt Min loss/ERP     Cervical Differential Testing:  Rep Pro    Rep Ret    Rep Ret Ext NE, NE, Inc ROM (B) rotation and SB w/less pain   Rep SB - R    Rep SB - L    Rep Rot - R    Rep Rot - L    Rep Flex      Test Movements:   During: produces, abolishes, increases, decreases, no effect, centralizing, peripheralizing  After: better, worse, no better, no worse, no effect, centralized, peripheralized    Pretest symptoms sittin/10 pain   Symptoms During Symptoms After ROM increased ROM decreased No Effect   FLEX        Rep FLEX        EXT Produces  (B) rib cage    No Worse         Rep EXT Produces  (B) rib cage then moves to central T/S with reps    No Worse    X     Pretest symptoms lying:     Symptoms During Symptoms After ROM increased ROM decreased No Effect   EIL (prone)        Rep EIL (prone)        EIL (supine)        Rep EIL (supine)        Pretest symptoms sitting:     Symptoms During Symptoms After ROM increased ROM decreased No Effect   ROT - R        Rep ROT - R        ROT - L        Rep ROT - L        Other          Static Tests:  Flexion:       Rotation R:    Extension (prone/supine):    Rotation L:       Other Tests:  NT    Provisional Classification: Derangement    Principle of Management:  Education:  Posture, centralization, specificity of exercise    Equipment provided:  None.  Use of rolled towel for posture correction  Mechanical therapy (Y/N):  Y    Extension principle:  Repeated C/S retr/Ext, T/S ext x 10 reps ea, every 2 hrs        ASSESSMENT/PLAN:    Patient is a 22 year old female with cervical and thoracic complaints.    Patient has the following significant findings with corresponding treatment plan.                Diagnosis 1:  Neck/Upper Back Pain    Pain -  self management, education, directional preference exercise and home program  Decreased ROM/flexibility - manual therapy, therapeutic exercise and home program  Decreased joint mobility - manual therapy, therapeutic exercise and home program  Decreased strength - therapeutic exercise, therapeutic activities and home program  Impaired muscle performance - neuro re-education and home program  Decreased function - therapeutic activities and home program  Impaired posture - neuro re-education and home program    Therapy Evaluation Codes:   1) History comprised of:   Personal factors that impact the plan of care:      None.    Comorbidity factors that impact the plan of care are:      Asthma and Overweight.     Medications impacting care: None.  2) Examination of Body Systems comprised of:   Body structures and functions that impact the plan of care:      Cervical spine and Thoracic Spine.   Activity limitations that impact the plan of care are:      Bending, Driving and Sitting.  3) Clinical presentation characteristics are:   Stable/Uncomplicated.  4) Decision-Making    Low complexity using standardized patient assessment instrument and/or measureable assessment of functional outcome.  Cumulative Therapy Evaluation is: Low complexity.    Previous and current functional limitations:  (See Goal Flow Sheet for this information)    Short term and Long term goals: (See  Goal Flow Sheet for this information)     Communication ability:  Patient appears to be able to clearly communicate and understand verbal and written communication and follow directions correctly.  Treatment Explanation - The following has been discussed with the patient:   RX ordered/plan of care  Anticipated outcomes  Possible risks and side effects  This patient would benefit from PT intervention to resume normal activities.   Rehab potential is good.    Frequency:  1 X week, once daily  Duration:  for 6 weeks  Discharge Plan:  Achieve all LTG.  Independent in home treatment program.  Reach maximal therapeutic benefit.    Please refer to the daily flowsheet for treatment today, total treatment time and time spent performing 1:1 timed codes.

## 2018-07-26 ENCOUNTER — THERAPY VISIT (OUTPATIENT)
Dept: CHIROPRACTIC MEDICINE | Facility: CLINIC | Age: 23
End: 2018-07-26
Payer: COMMERCIAL

## 2018-07-26 ENCOUNTER — THERAPY VISIT (OUTPATIENT)
Dept: PHYSICAL THERAPY | Facility: CLINIC | Age: 23
End: 2018-07-26
Payer: COMMERCIAL

## 2018-07-26 DIAGNOSIS — M54.2 NECK PAIN: ICD-10-CM

## 2018-07-26 DIAGNOSIS — M54.6 PAIN IN THORACIC SPINE: ICD-10-CM

## 2018-07-26 DIAGNOSIS — M62.838 SPASM OF MUSCLE: ICD-10-CM

## 2018-07-26 DIAGNOSIS — M99.02 THORACIC SEGMENT DYSFUNCTION: Primary | ICD-10-CM

## 2018-07-26 DIAGNOSIS — M54.6 BILATERAL THORACIC BACK PAIN: ICD-10-CM

## 2018-07-26 PROCEDURE — 97112 NEUROMUSCULAR REEDUCATION: CPT | Mod: GP | Performed by: PHYSICAL THERAPY ASSISTANT

## 2018-07-26 PROCEDURE — 97110 THERAPEUTIC EXERCISES: CPT | Mod: GP | Performed by: PHYSICAL THERAPY ASSISTANT

## 2018-07-26 PROCEDURE — 98940 CHIROPRACT MANJ 1-2 REGIONS: CPT | Mod: AT | Performed by: CHIROPRACTOR

## 2018-07-26 NOTE — PROGRESS NOTES
Visit #:  13    Subjective:  Juanita Gonzalez is a 22 year old female who is seen in f/u up for:        Segmental dysfunction of pelvic region  Lumbago  Segmental dysfunction of lumbar region  Spasm of muscle  Thoracic segment dysfunction.     Since last visit on 7/12/2018,  Juanita Gonzlaez reports the following changes: Pain immediately after last treatment: 0/10 and their pain level today 0-1/10.  Juanita reports that she has been feeling better. She has been to pt and has had a couple of session.  This has been very beneficial for her and she is not in as much pain.    Thoracic :  Symptoms are graded at 0-1/0. The quality is described as stiff at times.  Motion has increased, and has been consistent since last visit. Patient feels that they are improved due to a reduction in symptoms.        Objective:  The following was observed:    P: palpatory tenderness, T-spine para-spinal R>>L    A: static palpation demonstrates intersegmental asymmetry , thoracic, lumbar, pelvis    R: motion palpation notes restricted motion, :  T3 Extension restriction  T4 Extension restriction  T5 Extension restriction        T: hypertonicity at: T-Spine paraspinal R>>L      Assessment:    Segmental spinal dysfunction/restrictions found at:  T3  T4  T5    Diagnoses:      1. Segmental dysfunction of thoracic region    2. Spasm of muscle                   Patient's condition:  Patient had restrictions pre-manipulation    Treatment effectiveness:  Post manipulation there is better intersegmental movement and Patient claims to feel looser post manipulation      Procedures:  CMT:  18018 Chiropractic manipulative treatment 1-2 regions performed   Thoracic: Diversified, T3, T4, T5, Prone      Modalities:  none    Therapeutic procedures:  None      Prognosis: Good    Progress towards Goals: Patient is making progress towards the goal     Response to Treatment:   Reduction in symptoms as reported by  patient      Recommendations:    Instructions:ice 20 minutes every other hour as needed    Follow-up:   Return to care if symptoms persist

## 2018-08-02 ENCOUNTER — THERAPY VISIT (OUTPATIENT)
Dept: PHYSICAL THERAPY | Facility: CLINIC | Age: 23
End: 2018-08-02
Payer: COMMERCIAL

## 2018-08-02 DIAGNOSIS — M54.6 BILATERAL THORACIC BACK PAIN: ICD-10-CM

## 2018-08-02 DIAGNOSIS — M54.2 NECK PAIN: ICD-10-CM

## 2018-08-02 PROCEDURE — 97112 NEUROMUSCULAR REEDUCATION: CPT | Mod: GP | Performed by: PHYSICAL THERAPY ASSISTANT

## 2018-08-02 PROCEDURE — 97110 THERAPEUTIC EXERCISES: CPT | Mod: GP | Performed by: PHYSICAL THERAPY ASSISTANT

## 2018-08-08 ENCOUNTER — RADIANT APPOINTMENT (OUTPATIENT)
Dept: GENERAL RADIOLOGY | Facility: CLINIC | Age: 23
End: 2018-08-08
Attending: FAMILY MEDICINE
Payer: COMMERCIAL

## 2018-08-08 ENCOUNTER — OFFICE VISIT (OUTPATIENT)
Dept: FAMILY MEDICINE | Facility: CLINIC | Age: 23
End: 2018-08-08
Payer: COMMERCIAL

## 2018-08-08 VITALS
TEMPERATURE: 98.5 F | DIASTOLIC BLOOD PRESSURE: 70 MMHG | BODY MASS INDEX: 42.34 KG/M2 | SYSTOLIC BLOOD PRESSURE: 118 MMHG | OXYGEN SATURATION: 97 % | HEART RATE: 76 BPM | WEIGHT: 248 LBS | HEIGHT: 64 IN

## 2018-08-08 DIAGNOSIS — B37.2 YEAST INFECTION OF THE SKIN: ICD-10-CM

## 2018-08-08 DIAGNOSIS — Z00.01 ENCOUNTER FOR ROUTINE ADULT HEALTH EXAMINATION WITH ABNORMAL FINDINGS: Primary | ICD-10-CM

## 2018-08-08 DIAGNOSIS — R06.02 SOB (SHORTNESS OF BREATH): ICD-10-CM

## 2018-08-08 DIAGNOSIS — Z11.4 SCREENING FOR HIV (HUMAN IMMUNODEFICIENCY VIRUS): ICD-10-CM

## 2018-08-08 DIAGNOSIS — R05.9 COUGH: ICD-10-CM

## 2018-08-08 DIAGNOSIS — J30.2 SEASONAL ALLERGIC RHINITIS, UNSPECIFIED CHRONICITY, UNSPECIFIED TRIGGER: ICD-10-CM

## 2018-08-08 DIAGNOSIS — F90.0 ATTENTION DEFICIT HYPERACTIVITY DISORDER (ADHD), PREDOMINANTLY INATTENTIVE TYPE: ICD-10-CM

## 2018-08-08 DIAGNOSIS — Z11.3 SCREENING EXAMINATION FOR VENEREAL DISEASE: ICD-10-CM

## 2018-08-08 DIAGNOSIS — L98.9 BUMPS ON SKIN: ICD-10-CM

## 2018-08-08 DIAGNOSIS — J45.20 MILD INTERMITTENT ASTHMA WITHOUT COMPLICATION: ICD-10-CM

## 2018-08-08 DIAGNOSIS — L70.0 ACNE VULGARIS: ICD-10-CM

## 2018-08-08 LAB
FEF 25/75: NORMAL
FEV-1: NORMAL
FEV1/FVC: NORMAL
FVC: NORMAL

## 2018-08-08 PROCEDURE — 94010 BREATHING CAPACITY TEST: CPT | Performed by: FAMILY MEDICINE

## 2018-08-08 PROCEDURE — 71046 X-RAY EXAM CHEST 2 VIEWS: CPT | Mod: FY

## 2018-08-08 PROCEDURE — 99395 PREV VISIT EST AGE 18-39: CPT | Performed by: FAMILY MEDICINE

## 2018-08-08 PROCEDURE — 99214 OFFICE O/P EST MOD 30 MIN: CPT | Mod: 25 | Performed by: FAMILY MEDICINE

## 2018-08-08 RX ORDER — ALBUTEROL SULFATE 90 UG/1
2 AEROSOL, METERED RESPIRATORY (INHALATION) EVERY 6 HOURS
Qty: 1 INHALER | Refills: 0 | Status: SHIPPED | OUTPATIENT
Start: 2018-08-08 | End: 2019-07-11

## 2018-08-08 RX ORDER — MONTELUKAST SODIUM 10 MG/1
10 TABLET ORAL AT BEDTIME
Qty: 90 TABLET | Refills: 0 | Status: SHIPPED | OUTPATIENT
Start: 2018-08-08 | End: 2019-07-11

## 2018-08-08 RX ORDER — DEXTROAMPHETAMINE SACCHARATE, AMPHETAMINE ASPARTATE MONOHYDRATE, DEXTROAMPHETAMINE SULFATE AND AMPHETAMINE SULFATE 5; 5; 5; 5 MG/1; MG/1; MG/1; MG/1
20 CAPSULE, EXTENDED RELEASE ORAL DAILY
Qty: 30 CAPSULE | Refills: 0 | Status: CANCELLED | OUTPATIENT
Start: 2018-08-08

## 2018-08-08 RX ORDER — CETIRIZINE HYDROCHLORIDE 10 MG/1
10 TABLET ORAL DAILY
Qty: 30 TABLET | Refills: 1 | Status: SHIPPED | OUTPATIENT
Start: 2018-08-08 | End: 2019-07-11

## 2018-08-08 RX ORDER — DROSPIRENONE AND ETHINYL ESTRADIOL 0.03MG-3MG
1 KIT ORAL DAILY
Qty: 90 TABLET | Refills: 3 | Status: SHIPPED | OUTPATIENT
Start: 2018-08-08 | End: 2019-07-11

## 2018-08-08 RX ORDER — NYSTATIN 100000 [USP'U]/G
POWDER TOPICAL 3 TIMES DAILY PRN
Qty: 60 G | Refills: 1 | Status: SHIPPED | OUTPATIENT
Start: 2018-08-08 | End: 2019-07-11

## 2018-08-08 ASSESSMENT — ENCOUNTER SYMPTOMS
BREAST MASS: 0
JOINT SWELLING: 0
HEADACHES: 1
DYSURIA: 0
NERVOUS/ANXIOUS: 1
FREQUENCY: 1
WEAKNESS: 0
NAUSEA: 0
MYALGIAS: 0
EYE PAIN: 1
PARESTHESIAS: 0
FEVER: 0
DIZZINESS: 0
CHILLS: 0
CONSTIPATION: 0
DIARRHEA: 1
PALPITATIONS: 0
COUGH: 0
SORE THROAT: 0
SHORTNESS OF BREATH: 1
ABDOMINAL PAIN: 0
HEMATURIA: 0
ARTHRALGIAS: 1
HEARTBURN: 1
HEMATOCHEZIA: 0

## 2018-08-08 NOTE — MR AVS SNAPSHOT
After Visit Summary   8/8/2018    Juanita Gonzalez    MRN: [id number]           Patient Information     Date Of Birth          1995        Visit Information        Provider Department      8/8/2018 1:40 PM Eh Mack DO [facility]        Today's Diagnoses     Encounter for routine adult health examination with abnormal findings    -  1    Attention deficit hyperactivity disorder (ADHD), predominantly inattentive type        Mild intermittent asthma without complication        Screening examination for venereal disease        Screening for HIV (human immunodeficiency virus)        Acne vulgaris        Seasonal allergic rhinitis, unspecified chronicity, unspecified trigger        SOB (shortness of breath)        Cough        Bumps on skin        Yeast infection of the skin        Body mass index 40.0-44.9, adult (H)          Care Instructions    Start Singulair and Zyrtec for shortness of breath, chest pain and cough. Continue to use albuterol as needed.  Follow up in one to two weeks for recheck.      Apply Nystatin on areas of bumps on abdominal area as directed.     Follow up with weight management either at [facility], or at [facility] as discussed.     Preventive Health Recommendations  Female Ages 18 to 20     Yearly exam:     See your health care provider every year in order to  o Review health changes.   o Discuss preventive care.    o Review your medicines if your doctor has prescribed any.      You should be tested each year for STDs (sexually transmitted diseases).       After age 20, talk to your provider about how often you should have cholesterol testing.      If you are at risk for diabetes, you should have a diabetes test (fasting glucose).     Shots:     Get a flu shot each year.     Get a tetanus shot every 10 years.     Consider getting the shot (vaccine) that prevents cervical cancer (Gardasil).    Nutrition:     Eat at least 5  servings of fruits and vegetables each day.    Eat whole-grain bread, whole-wheat pasta and brown rice instead of white grains and rice.    Get adequate Calcium and Vitamin D.     Lifestyle    Exercise at least 150 minutes a week each week (30 minutes a day, 5 days a week). This will help you control your weight and prevent disease.    No smoking.     Wear sunscreen to prevent skin cancer.    See your dentist every six months for an exam and cleaning.    [facility]   Discharged by : Elenita BARROW CMA ([facility])    Paper scripts provided to patient : none      If you have any questions regarding your visit please contact your care team:     Team Gold                Clinic Hours Telephone Number     Dr. Leta Resendez, CNP 7am-7pm  Monday - Thursday   7am-5pm  Fridays  [phone]   (Appointment scheduling available 24/7)     RN Line  [phone] option 2     Urgent Care - [place] [facility] - 11am-9pm Monday-Friday Saturday-Sunday- 9am-5pm     [place] -   5pm-9pm Monday-Friday Saturday-Sunday- 9am-5pm    [phone] - [place]    [phone] - [place]       For a Price Quote for your services, please call our Consumer Price Line at [phone].     What options do I have for visits at the clinic other than the traditional office visit?     To expand how we care for you, many of our providers are utilizing electronic visits (e-visits) and telephone visits, when medically appropriate, for interactions with their patients rather than a visit in the clinic. We also offer nurse visits for many medical concerns. Just like any other service, we will bill your insurance company for this type of visit based on time spent on the phone with your provider. Not all insurance companies cover these visits. Please check with your medical insurance if this type of visit is covered. You will be responsible for any charges that  are not paid by your insurance.   E-visits via [employer]: generally incur a $35.00 fee.     Telephone visits:  Time spent on the phone: *charged based on time that is spent on the phone in increments of 10 minutes. Estimated cost:   5-10 mins $30.00   11-20 mins. $59.00   21-30 mins. $85.00       Use [employer] (secure email communication and access to your chart) to send your primary care provider a message or make an appointment. Ask someone on your Team how to sign up for [employer].     As always, Thank you for trusting us with your health care needs!      [facility]:    Scheduling Appointments  [facility]  Call: [phone]    Kelsey Trevizo [facility]  Call: [phone]    [facility]  Call: [phone]    For Gastroenterology referrals   [facility] Gastroenterology   Clinics and Surgery Center, 4th Floor   [address] [place] [address]   Appointments: [phone]    WHERE TO GO FOR CARE?  Clinic    Make an appointment if you:       Are sick (cold, cough, flu, sore throat, earache or in pain).       Have a small injury (sprain, small cut, burn or broken bone).       Need a physical exam, Pap smear, vaccine or prescription refill.       Have questions about your health or medicines.    To reach us:      Call [phone] ([phone]). Open 24 hours every day. (For counseling services, call [phone].)    Log into [employer] at [email or web address]. (Visit [email or web address] to create an account.) Hospital emergency room    An emergency is a serious or life- threatening problem that must be treated right away.    Call 279 or get to the hospital if you have:      Very bad or sudden:            - Chest pain or pressure         - Bleeding         - Head or belly pain         - Dizziness or trouble seeing, walking or                          Speaking      Problems breathing      Blood in your vomit or you are coughing up  blood      A major injury (knocked out, loss of a finger or limb, rape, broken bone protruding from skin)    A mental health crisis. (Or call the Mental Health Crisis line at 1-367.231.8352 or Suicide Prevention Hotline at 1-158.500.5780.)    Open 24 hours every day. You don't need an appointment.     Urgent care    Visit urgent care for sickness or small injuries when the clinic is closed. You don't need an appointment. To check hours or find an urgent care near you, visit [email or web address]. Online care    Get online care from [employer] for more than 70 common problems, like colds, allergies and infections. Open 24 hours every day at:   [email or web address]   Need help deciding?    For advice about where to be seen, you may call your clinic and ask to speak with a nurse. We're here for you 24 hours every day.         If you are deaf or hard of hearing, please let us know. We provide many free services including sign language interpreters, oral interpreters, TTYs, telephone amplifiers, note takers and written materials.                   Follow-ups after your visit        Additional Services     BARIATRIC ADULT REFERRAL       Your provider has referred you to: [facility]: [facility] [phone]. [email or web address]    Please be aware that coverage of these services is subject to the terms and limitations of your health insurance plan.  Call member services at your health plan with any benefit or coverage questions.      Please bring the following with you to your appointment:      (1) List of current medications   (2) This referral request   (3) Any documents/labs given to you for this referral                  Your next 10 appointments already scheduled     Aug 09, 2018  2:40 PM CDT   [facility] with Laura Crabtree PTA   [facility] ([facility])    [address] [place]  [address]  [facility] [id number] [phone]              Who to contact     If you have questions or need follow up information about today's clinic visit or your schedule please contact [facility] directly at [phone].  Normal or non-critical lab and imaging results will be communicated to you by MyChart, letter or phone within 4 business days after the clinic has received the results. If you do not hear from us within 7 days, please contact the clinic through [facility] [employer] or phone. If you have a critical or abnormal lab result, we will notify you by phone as soon as possible.  Submit refill requests through [employer] or call your pharmacy and they will forward the refill request to us. Please allow 3 business days for your refill to be completed.          Additional Information About Your Visit        [facility] Information     [employer] gives you secure access to your electronic health record. If you see a primary care provider, you can also send messages to your care team and make appointments. If you have questions, please call your primary care clinic.  If you do not have a primary care provider, please call [phone] and they will assist you.        Care EveryWhere ID     This is your Care EveryWhere ID. This could be used by other organizations to access your [facility] medical records  [id number]        Your Vitals Were     Pulse Temperature Height Last Period Pulse Oximetry BMI (Body Mass Index)    76 98.5  F (36.9  C) (Oral) 5' 4.17\" (1.63 m) 07/25/2018 97% 42.34 kg/m2       Blood Pressure from Last 3 Encounters:   08/08/18 118/70   04/10/18 127/78   02/27/18 120/75    Weight from Last 3 Encounters:   08/08/18 248 lb (112.5 kg)   02/27/18 256 lb 3.2 oz (116.2 kg)   09/06/17 251 lb 6.4 oz (114 kg) (>99 %)*     * Growth percentiles are based on CDC 2-20 Years data.              We Performed the Following     BARIATRIC ADULT REFERRAL     Spirometry, Breathing Capacity          Today's " Medication Changes          These changes are accurate as of 8/8/18  2:56 PM.  If you have any questions, ask your nurse or doctor.               Start taking these medicines.        Dose/Directions    albuterol 108 (90 Base) MCG/ACT Inhaler   Commonly known as:  PROAIR HFA   Used for:  Mild intermittent asthma without complication   Started by:  Eh Mack DO        Dose:  2 puff   Inhale 2 puffs into the lungs every 6 hours   Quantity:  1 Inhaler   Refills:  0       cetirizine 10 MG tablet   Commonly known as:  zyrTEC   Used for:  Mild intermittent asthma without complication, Seasonal allergic rhinitis, unspecified chronicity, unspecified trigger, SOB (shortness of breath), Cough   Started by:  Eh Mack DO        Dose:  10 mg   Take 1 tablet (10 mg) by mouth daily   Quantity:  30 tablet   Refills:  1       montelukast 10 MG tablet   Commonly known as:  SINGULAIR   Used for:  Seasonal allergic rhinitis, unspecified chronicity, unspecified trigger, SOB (shortness of breath), Cough   Started by:  Eh Mack DO        Dose:  10 mg   Take 1 tablet (10 mg) by mouth At Bedtime   Quantity:  90 tablet   Refills:  0       nystatin 501044 UNIT/GM Powd   Commonly known as:  MYCOSTATIN   Used for:  Bumps on skin, Yeast infection of the skin   Started by:  Eh Mack DO        Apply topically 3 times daily as needed   Quantity:  60 g   Refills:  1            Where to get your medicines      These medications were sent to [facility] [address]  [address] [facility], [facility] [address]     Phone:  [phone]     albuterol 108 (90 Base) MCG/ACT Inhaler    cetirizine 10 MG tablet    drospirenone-ethinyl estradiol 3-0.03 MG per tablet    montelukast 10 MG tablet    nystatin [id number] UNIT/GM Powd                Primary Care Provider Office Phone # Fax #    Jose Luis Millan [phone] [address] [facility]  [address]  [facility] [address]        Equal Access to Services     JASON GARRISON : Hadii aad ku hadyelenaswapnil Soenocali, waaxda luqadaha, qaybta kaalmada yosvany, laina rendon. So [facility] [phone].    ATENCIÓN: Si habla español, tiene a braswell disposición servicios gratuitos de asistencia lingüística. Jaye al [phone].    We comply with applicable federal civil rights laws and [place] laws. We do not discriminate on the basis of race, color, national origin, age, disability, sex, sexual orientation, or gender identity.            Thank you!     Thank you for choosing [facility]  for your care. Our goal is always to provide you with excellent care. Hearing back from our patients is one way we can continue to improve our services. Please take a few minutes to complete the written survey that you may receive in the mail after your visit with us. Thank you!             Your Updated Medication List - Protect others around you: Learn how to safely use, store and throw away your medicines at www.disposemymeds.org.          This list is accurate as of 8/8/18  2:56 PM.  Always use your most recent med list.                   Brand Name Dispense Instructions for use Diagnosis    albuterol 108 (90 Base) MCG/ACT Inhaler    PROAIR HFA    1 Inhaler    Inhale 2 puffs into the lungs every 6 hours    Mild intermittent asthma without complication       amphetamine-dextroamphetamine 20 MG per 24 hr capsule    ADDERALL XR    30 capsule    Take 1 capsule (20 mg) by mouth daily    Attention deficit hyperactivity disorder (ADHD), predominantly inattentive type       cetirizine 10 MG tablet    zyrTEC    30 tablet    Take 1 tablet (10 mg) by mouth daily    Mild intermittent asthma without complication, Seasonal allergic rhinitis, unspecified chronicity, unspecified trigger, SOB (shortness of breath), Cough       drospirenone-ethinyl estradiol 3-0.03 MG per tablet    ARELIS 28    90 tablet    Take 1  tablet by mouth daily DISPENSE 3 MONTH supply at a time please    Acne vulgaris       montelukast 10 MG tablet    SINGULAIR    90 tablet    Take 1 tablet (10 mg) by mouth At Bedtime    Seasonal allergic rhinitis, unspecified chronicity, unspecified trigger, SOB (shortness of breath), Cough       nystatin [id number] UNIT/GM Powd    MYCOSTATIN    60 g    Apply topically 3 times daily as needed    Bumps on skin, Yeast infection of the skin

## 2018-08-08 NOTE — PROGRESS NOTES
SUBJECTIVE:   CC: Juanita Gonzalez is an 22 year old woman who presents for preventive health visit.     Physical   Annual:     Getting at least 3 servings of Calcium per day:  Yes    Bi-annual eye exam:  NO    Dental care twice a year:  NO    Sleep apnea or symptoms of sleep apnea:  None    Diet:  Regular (no restrictions)    Frequency of exercise:  1 day/week    Duration of exercise:  15-30 minutes    Taking medications regularly:  Yes    Medication side effects:  None    Additional concerns today:  No      Other Concerns:  Would like to get back on Adderall- has been off medication x 3 months    Patient is new to the clinic and establishing care.     ADHD  Patient has a history of ADHD and reports that this may have been diagnosed at around first grade.  She stated that stopped taking adderall due to not establishing care with provider. She stated that in the past she took adderall during the school year only, but stated that since then taking daily. She reported that she is currently attending  [employer] and is doing well without adderall. However she stated that she wants to get back on adderall due to the comfort aspect of having adderall in place.   She reported that she uses mariajuana occasionally.   She stated that she has followed with therapist in the past for ADHD.   Patient reported that she has a family history of father with ADHD.     Sexual Activity and STD Screening and HIV Screening  Patient reported that she is currently sexually active. She reported that [facility] and half [facility] and completed HIV and STD screening that was negative. Declined HIV screening and STD screening today.    Denies current chance of pregnancy.       Social History   She reported that she has no unprotected sex. Denies tobacco use history. She reports that she uses occasional mariajuana.     Weight   Patient reported that she is making a conscious effort to lose weight,  "but weight loss is difficult.   She also stated that she has followed with weight management  and completed genetics program in middle school.     Contraception and  Acne Vulgaris   Patient is taking Zoraida 28 for acne vulgaris. I discussed with patient that oral contraception is not effective with people who weigh over 200 pounds. I also discussed about the risks of oral contraceptions and blood clots and ACOG guidelines.   She stated that she is interested in IUD placement.      Shortness of Breath and Asthma and Allergies    patient reports that she experiences symptoms of shortness of breath and episodes of difficulty taking in deep breaths, episodes of unable to catch her breath, and unable to breath \"properly\" at times. Denies coughs with shortness of breath. Patient stated that these symptoms has been going on for the past two months. She also noted that she has a history of allergies and asthma. She stated that asthma was more of a problem when she was younger and needed to use an inhaler. However she reported that as she got older, asthma has not been \"effecting\" her much and becomes symptomatic during when sick. She also reported that asthma symptoms are worse with exercise.     She reported that she has an old rescue inhaler that she uses during times of shortness of breath. She stated that she has not used rescue inhaler in the past couple weeks.      She reported that she was taking loratadine, but stopped due to someone telling her that she does not need to take this.       Skin   Patient reports of \"weird bumps\" on genital area for the past couples weeks. These bumps were reported to be painful, abnormal appearing, but slightly improving since onset.   She stated that [facility], but [facility]D Screening was negative. She stated that she was applying lidocaine cream on affected areas and [facility].      Menses   Patient reports that last menstrual " period was one week and half ago.      Today's PHQ-2 Score:   PHQ-2 ( 1999 Pfizer) 8/8/2018   Q1: Little interest or pleasure in doing things 0   Q2: Feeling down, depressed or hopeless 1   PHQ-2 Score 1   Q1: Little interest or pleasure in doing things Not at all   Q2: Feeling down, depressed or hopeless Several days   PHQ-2 Score 1       Abuse: Current or Past(Physical, Sexual or Emotional)- No  Do you feel safe in your environment - Yes    Social History   Substance Use Topics     Smoking status: Never Smoker     Smokeless tobacco: Never Used     Alcohol use Yes      Comment: Occasional with friends.     Alcohol Use 8/8/2018   If you drink alcohol do you typically have greater than 3 drinks per day OR greater than 7 drinks per week? No   No flowsheet data found.    Reviewed orders with patient.  Reviewed health maintenance and updated orders accordingly - Yes  Labs reviewed in EPIC  BP Readings from Last 3 Encounters:   08/08/18 118/70   04/10/18 127/78   02/27/18 120/75    Wt Readings from Last 3 Encounters:   08/08/18 248 lb (112.5 kg)   02/27/18 256 lb 3.2 oz (116.2 kg)   09/06/17 251 lb 6.4 oz (114 kg) (>99 %)*     * Growth percentiles are based on CDC 2-20 Years data.                  Patient Active Problem List   Diagnosis     Obesity     Attention deficit hyperactivity disorder (ADHD)     Disturbance in sleep behavior     Acanthosis nigricans     Acne     Seasonal allergies     Eczema     Vitamin D deficiency     Insulin resistance     Anisometropia     Dysmetabolic syndrome X     Myopia     Enchondroma of wrist or hand     Osteoma     Café au lait spot     Neck pain     Bilateral thoracic back pain     Past Surgical History:   Procedure Laterality Date     DENTAL SURGERY       GRAFT BONE TO FINGER  3/18/2013    Procedure: GRAFT BONE TO FINGER;  Right Second Metacarpal Curettage and Allograft;  Surgeon: Jeremiah Yu MD;  Location:  OR      TOOTH EXTRACTION W/FORCEP       [facility] N/A  6/11/2015    Procedure: LE FORT ONE;  Surgeon: Vitaliy Yun DDS;  Location: [facility] OR     LE FORT ONE , OSTEOTOMY SAGITTAL SPLIT, COMBINED N/A 12/3/2014    Procedure: COMBINED LE FORT ONE, OSTEOTOMY SAGITTAL SPLIT;  Surgeon: Vitaliy Yun DDS;  Location: [facility] OR     TONGUE SURGERY  9/17/2014    tongue lesion removed       Social History   Substance Use Topics     Smoking status: Never Smoker     Smokeless tobacco: Never Used     Alcohol use Yes      Comment: Occasional with friends.     Family History   Problem Relation Age of Onset     Asthma Mother      Obesity Father      Hypertension Maternal Grandmother      Cancer Maternal Grandfather      Hypertension Paternal Grandmother      Glaucoma Other      Mat Great Aunt/blindness     Glaucoma Maternal Aunt      Macular Degeneration No family hx of          Current Outpatient Prescriptions   Medication Sig Dispense Refill     albuterol (PROAIR HFA) 108 (90 Base) MCG/ACT Inhaler Inhale 2 puffs into the lungs every 6 hours 1 Inhaler 0     cetirizine (ZYRTEC) 10 MG tablet Take 1 tablet (10 mg) by mouth daily 30 tablet 1     drospirenone-ethinyl estradiol (ARELIS 28) 3-0.03 MG per tablet Take 1 tablet by mouth daily DISPENSE 3 MONTH supply at a time please 90 tablet 3     montelukast (SINGULAIR) 10 MG tablet Take 1 tablet (10 mg) by mouth At Bedtime 90 tablet 0     nystatin (MYCOSTATIN) 186377 UNIT/GM POWD Apply topically 3 times daily as needed 60 g 1     amphetamine-dextroamphetamine (ADDERALL XR) 20 MG per 24 hr capsule Take 1 capsule (20 mg) by mouth daily (Patient not taking: Reported on 8/8/2018) 30 capsule 0     Allergies   Allergen Reactions     Bactrim Hives     Recent Labs   Lab Test  09/23/15   2338  02/11/15   0739  06/11/13   1009   A1C   --   5.4   --    LDL   --    --   74   HDL   --    --   36*   TRIG   --    --   62   CR  0.70   --    --    GFRESTIMATED  >90  Non  GFR Calc     --    --    GFRESTBLACK  >90   GFR Calc     --     --    POTASSIUM  3.4   --    --    TSH   --   0.99  2.61        Mammogram not appropriate for this patient based on age.    Pertinent mammograms are reviewed under the imaging tab.  History of abnormal Pap smear: NO - under age 21, PAP not appropriate for age     Reviewed and updated as needed this visit by clinical staff  Tobacco  Allergies  Meds  Med Hx  Surg Hx  Fam Hx  Soc Hx        Reviewed and updated as needed this visit by Provider        Past Medical History:   Diagnosis Date     Allergic conjunctivitis      Anisometropia      Asthma      Attention deficit disorder with hyperactivity(314.01)     stopped medication 1 year ago     Myopia      Nonorganic enuresis     voiding dysfunction      Obesity, unspecified      OCD (obsessive compulsive disorder)      Wears contact lenses       Past Surgical History:   Procedure Laterality Date     DENTAL SURGERY       GRAFT BONE TO FINGER  3/18/2013    Procedure: GRAFT BONE TO FINGER;  Right Second Metacarpal Curettage and Allograft;  Surgeon: Jeremiah Yu MD;  [facility]: [facility] OR     HC TOOTH EXTRACTION W/FORCEP       LE FORT ONE N/A 6/11/2015    Procedure: LE FORT ONE;  Surgeon: Vitaliy Yun DDS;  Location: [facility] OR     LE FORT ONE , OSTEOTOMY SAGITTAL SPLIT, COMBINED N/A 12/3/2014    Procedure: COMBINED LE FORT ONE, OSTEOTOMY SAGITTAL SPLIT;  Surgeon: Vitaliy Yun DDS;  Location: [facility] OR     TONGUE SURGERY  9/17/2014    tongue lesion removed     Obstetric History     No data available          Review of Systems   Constitutional: Negative for chills and fever.   HENT: Positive for congestion. Negative for ear pain, hearing loss and sore throat.    Eyes: Positive for pain and visual disturbance.   Respiratory: Positive for shortness of breath. Negative for cough.    Cardiovascular: Positive for chest pain. Negative for palpitations and peripheral edema.   Gastrointestinal: Positive for diarrhea and heartburn. Negative for abdominal pain,  "constipation, hematochezia and nausea.   Breasts:  Negative for tenderness, breast mass and discharge.   Genitourinary: Positive for frequency, genital sores and vaginal discharge. Negative for dysuria, hematuria, pelvic pain, urgency and vaginal bleeding.   Musculoskeletal: Positive for arthralgias. Negative for joint swelling and myalgias.   Skin: Negative for rash.   Neurological: Positive for headaches. Negative for dizziness, weakness and paresthesias.   Psychiatric/Behavioral: Negative for mood changes. The patient is nervous/anxious.      This document serves as a record of the services and decisions personally performed and made by Eh Mack D.O. It was created on her behalf by Nghia Maldonado, a trained medical scribe. The creation of this document is based on the provider's statements to the medical scribe.  Nghia Maldonado August 8, 2018 2:01 PM         OBJECTIVE:   /70 (BP Location: Right arm, Patient Position: Sitting, Cuff Size: Adult Large)  Pulse 76  Temp 98.5  F (36.9  C) (Oral)  Ht 5' 4.17\" (1.63 m)  Wt 248 lb (112.5 kg)  LMP 07/25/2018  SpO2 97%  BMI 42.34 kg/m2  Physical Exam  GENERAL: alert, no distress and obese  EYES: Eyes grossly normal to inspection, PERRL and conjunctivae and sclerae normal  HENT: ear canals and TM's normal, nose and mouth without ulcers or lesions  NECK: no adenopathy, no asymmetry, masses, or scars and thyroid normal to palpation  RESP: lungs clear to auscultation - no rales, rhonchi or wheezes  BREAST: normal without masses, tenderness or nipple discharge and no palpable axillary masses or adenopathy  CV: regular rate and rhythm, normal S1 S2, no S3 or S4, no murmur, click or rub, no peripheral edema and peripheral pulses strong  ABDOMEN: soft, nontender, no hepatosplenomegaly, no masses and bowel sounds normal   (female): normal female external genitalia, normal urethral meatus, vaginal mucosa pink, moist, well rugated, and normal cervix/adnexa/uterus " without masses or discharge  MS: no gross musculoskeletal defects noted, no edema  SKIN: no suspicious lesions or rashes. Yeast infection (two to three papules noted with redness) located at the pannus.   NEURO: Normal strength and tone, mentation intact and speech normal  PSYCH: mentation appears normal, affect normal/bright    Diagnostic Test Results:  Xray - I independently visualized the xray: Chest Xray: XRAY OF AFFECTED AREA WAS UNREMARKABLE BUT WILL BE REVIEWED BY RADIOLOGY.  Spirometry, Breathing Capacity today showed  air way obstruction.     ASSESSMENT/PLAN:   1. Encounter for routine adult health examination with abnormal findings  See below.     2. Attention deficit hyperactivity disorder (ADHD), predominantly inattentive type  Patient reports that she has not been taking adderall for the past three months due to not establishing care, but is doing well at [employer]. She would like refill today for comfort aspect of having adderall in place. I did not refill adderall today due to patient having symptoms of shortness or breath and chest pain. In addition, patient reports occasional marijuana use.       3. Mild intermittent asthma without complication  Patient reported that she has history of asthma that has improved compared to when she was young. She also stated that she has allergies. She stated that she discontinued taking loratadine after being told she does not have to take this. She will begin taking Zyrtec for symptoms of  shortness of breath, coughs, and chest pain. She will also continue to use albuterol inhaler for symptoms of shortness of breath. She will follow up in one to two weeks for recheck on symptoms.   - cetirizine (ZYRTEC) 10 MG tablet; Take 1 tablet (10 mg) by mouth daily  Dispense: 30 tablet; Refill: 1  - albuterol (PROAIR HFA) 108 (90 Base) MCG/ACT Inhaler; Inhale 2 puffs into the lungs every 6 hours  Dispense: 1 Inhaler; Refill: 0    4. Screening examination for venereal  disease  [facility] and has completed STD Screening and HIV Screening that was negative. She declines either screening today.     5. Screening for HIV (human immunodeficiency virus)  HIV CDC Guidelines discussed with patient. Patient declined HIV Screening today.     6. Acne vulgaris  Patient has a BMI of 42.43 and is taking oral contraception. Patient is taking Zoraida 28 for acne vulgaris. I discussed with patient that oral contraception is not effective with people who weigh over 200 pounds. I advised condom use with intercourse.  I also discussed about the risks of oral contraceptions and blood clots and ACOG guidelines.   She stated that she is interested in IUD placement.    - drospirenone-ethinyl estradiol (ZORAIDA 28) 3-0.03 MG per tablet; Take 1 tablet by mouth daily DISPENSE 3 MONTH supply at a time please  Dispense: 90 tablet; Refill: 3    7. Seasonal allergic rhinitis, unspecified chronicity, unspecified trigger  Patient will begin Singulair and Zyrtec for shortness of breath, chest pain and cough symptoms. Spirometry, Breathing Capacity today showed  air way obstruction. Patient will follow up in one to two weeks for recheck on symptoms.   - Spirometry, Breathing Capacity  - montelukast (SINGULAIR) 10 MG tablet; Take 1 tablet (10 mg) by mouth At Bedtime  Dispense: 90 tablet; Refill: 0  - cetirizine (ZYRTEC) 10 MG tablet; Take 1 tablet (10 mg) by mouth daily  Dispense: 30 tablet; Refill: 1    8. SOB (shortness of breath)  9. Cough  Chest Xray: XRAY OF AFFECTED AREA WAS UNREMARKABLE BUT WILL BE REVIEWED BY RADIOLOGY. Patient will begin Singulair and Zyrtec for shortness of breath, chest pain and cough symptoms. Spirometry, Breathing Capacity today showed  air way obstruction. Patient will follow up in one to two weeks for recheck on symptoms.  - XR Chest 2 Views; Future  - Spirometry, Breathing Capacity  - montelukast (SINGULAIR) 10 MG  "tablet; Take 1 tablet (10 mg) by mouth At Bedtime  Dispense: 90 tablet; Refill: 0  - cetirizine (ZYRTEC) 10 MG tablet; Take 1 tablet (10 mg) by mouth daily  Dispense: 30 tablet; Refill: 1    10. Bumps on skin  11. Yeast infection of the skin  Patient will apply nystatin on affected areas located at the pannus.   - nystatin (MYCOSTATIN) 949809 UNIT/GM POWD; Apply topically 3 times daily as needed  Dispense: 60 g; Refill: 1    12. Body mass index 40.0-44.9, adult (H)  Patient stated that she will contact for coverage and will follow up with weight management either at [facility], or at [place] as discussed.   - BARIATRIC ADULT REFERRAL    COUNSELING:  Reviewed preventive health counseling, as reflected in patient instructions       Regular exercise       Healthy diet/nutrition       Immunizations               Contraception       Safe sex practices/STD prevention       HIV screeninx in teen years, 1x in adult years, and at intervals if high risk    BP Readings from Last 1 Encounters:   18 118/70     Estimated body mass index is 42.34 kg/(m^2) as calculated from the following:    Height as of this encounter: 5' 4.17\" (1.63 m).    Weight as of this encounter: 248 lb (112.5 kg).      Weight management plan: Discussed healthy diet and exercise guidelines and patient will follow up in 6 months in clinic to re-evaluate. Patient stated that she will contact for coverage and will follow up with weight management either at [facility], or at [place] as discussed.      reports that she has never smoked. She has never used smokeless tobacco.      Counseling Resources:  ATP IV Guidelines  Pooled Cohorts Equation Calculator  Breast Cancer Risk Calculator  FRAX Risk Assessment  ICSI Preventive Guidelines  Dietary Guidelines for Americans, 2010  USDA's MyPlate  ASA Prophylaxis  Lung CA Screening    The information in this document, created by the medical scribe for me, accurately reflects the " services I personally performed and the decisions made by me. I have reviewed and approved this document for accuracy prior to leaving the patient care area.  August 8, 2018 4:05 PM      Eh Mack DO  [facility]

## 2018-08-08 NOTE — PATIENT INSTRUCTIONS
Start Singulair and Zyrtec for shortness of breath, chest pain and cough. Continue to use albuterol as needed.  Follow up in one to two weeks for recheck.      Apply Nystatin on areas of bumps on abdominal area as directed.     Follow up with weight management either at [facility], or at [place] as discussed.     Preventive Health Recommendations  Female Ages 18 to 20     Yearly exam:     See your health care provider every year in order to  o Review health changes.   o Discuss preventive care.    o Review your medicines if your doctor has prescribed any.      You should be tested each year for STDs (sexually transmitted diseases).       After age 20, talk to your provider about how often you should have cholesterol testing.      If you are at risk for diabetes, you should have a diabetes test (fasting glucose).     Shots:     Get a flu shot each year.     Get a tetanus shot every 10 years.     Consider getting the shot (vaccine) that prevents cervical cancer (Gardasil).    Nutrition:     Eat at least 5 servings of fruits and vegetables each day.    Eat whole-grain bread, whole-wheat pasta and brown rice instead of white grains and rice.    Get adequate Calcium and Vitamin D.     Lifestyle    Exercise at least 150 minutes a week each week (30 minutes a day, 5 days a week). This will help you control your weight and prevent disease.    No smoking.     Wear sunscreen to prevent skin cancer.    See your dentist every six months for an exam and cleaning.    [facility]   Discharged by : Elenita BARROW CMA ([facility])    Paper scripts provided to patient : none      If you have any questions regarding your visit please contact your care team:     Team Gold                Clinic Hours Telephone Number     Dr. Leta Resendez, CNP 7am-7pm  Monday - Thursday   7am-5pm  Fridays  [phone]   (Appointment scheduling available 24/7)     RN  Line  [phone] option 2     Urgent Care - [place] - 11am-9pm Monday-Friday Saturday-Sunday- 9am-5pm     [place] -   5pm-9pm Monday-Friday Saturday-Sunday- 9am-5pm    [phone] - [place]    [phone] - [place]       For a Price Quote for your services, please call our Consumer Price Line at [phone].     What options do I have for visits at the clinic other than the traditional office visit?     To expand how we care for you, many of our providers are utilizing electronic visits (e-visits) and telephone visits, when medically appropriate, for interactions with their patients rather than a visit in the clinic. We also offer nurse visits for many medical concerns. Just like any other service, we will bill your insurance company for this type of visit based on time spent on the phone with your provider. Not all insurance companies cover these visits. Please check with your medical insurance if this type of visit is covered. You will be responsible for any charges that are not paid by your insurance.   E-visits via [employer]: generally incur a $35.00 fee.     Telephone visits:  Time spent on the phone: *charged based on time that is spent on the phone in increments of 10 minutes. Estimated cost:   5-10 mins $30.00   11-20 mins. $59.00   21-30 mins. $85.00       Use [employer] (secure email communication and access to your chart) to send your primary care provider a message or make an appointment. Ask someone on your Team how to sign up for [employer].     As always, Thank you for trusting us with your health care needs!      [facility]:    Scheduling Appointments  [place]  Call: [phone]    [facility]  Call: [phone]    [facility]  Call: [phone]    For Gastroenterology referrals   [facility]   Clinics and Surgery Center, 4th Floor   [address] [place] [address]   Appointments: [phone]    WHERE TO GO FOR CARE?  Clinic    Make an appointment if you:       Are sick (cold, cough, flu, sore throat, earache or in pain).       Have a small injury (sprain, small cut, burn or broken bone).       Need a physical exam, Pap smear, vaccine or prescription refill.       Have questions about your health or medicines.    To reach us:      Call [phone] ([phone]). Open 24 hours every day. (For counseling services, call [phone].)    Log into [employer] at [employer]. (Visit b3 bio to create an account.) Hospital emergency room    An emergency is a serious or life- threatening problem that must be treated right away.    Call 615 or get to the hospital if you have:      Very bad or sudden:            - Chest pain or pressure         - Bleeding         - Head or belly pain         - Dizziness or trouble seeing, walking or                          Speaking      Problems breathing      Blood in your vomit or you are coughing up blood      A major injury (knocked out, loss of a finger or limb, rape, broken bone protruding from skin)    A mental health crisis. (Or call the Mental Health Crisis line at 1-216.868.4631 or Suicide Prevention Hotline at 1-207.548.9341.)    Open 24 hours every day. You don't need an appointment.     Urgent care    Visit urgent care for sickness or small injuries when the clinic is closed. You don't need an appointment. To check hours or find an urgent care near you, visit [email or web address]. Online care    Get online care from [facility] for more than 70 common problems, like colds, allergies and infections. Open 24 hours every day at:   [email or web address]   Need help deciding?    For advice about where to be seen, you may call your clinic and ask to speak with a nurse. We're here for you 24 hours every day.         If you are deaf or hard of hearing, please let us know. We provide many free services  including sign language interpreters, oral interpreters, TTYs, telephone amplifiers, note takers and written materials.

## 2018-08-09 ENCOUNTER — THERAPY VISIT (OUTPATIENT)
Dept: PHYSICAL THERAPY | Facility: CLINIC | Age: 23
End: 2018-08-09
Payer: COMMERCIAL

## 2018-08-09 DIAGNOSIS — M54.2 NECK PAIN: ICD-10-CM

## 2018-08-09 DIAGNOSIS — M54.6 BILATERAL THORACIC BACK PAIN: ICD-10-CM

## 2018-08-09 PROCEDURE — 97110 THERAPEUTIC EXERCISES: CPT | Mod: GP | Performed by: PHYSICAL THERAPY ASSISTANT

## 2018-08-09 PROCEDURE — 97112 NEUROMUSCULAR REEDUCATION: CPT | Mod: GP | Performed by: PHYSICAL THERAPY ASSISTANT

## 2018-09-25 ENCOUNTER — OFFICE VISIT (OUTPATIENT)
Dept: OPHTHALMOLOGY | Facility: CLINIC | Age: 23
End: 2018-09-25
Payer: COMMERCIAL

## 2018-09-25 DIAGNOSIS — H02.889 MEIBOMIAN GLAND DYSFUNCTION: ICD-10-CM

## 2018-09-25 DIAGNOSIS — H52.13 MYOPIA OF BOTH EYES: Primary | ICD-10-CM

## 2018-09-25 ASSESSMENT — SLIT LAMP EXAM - LIDS
COMMENTS: FEW CAPPED GLANDS
COMMENTS: FEW CAPPED GLANDS

## 2018-09-25 ASSESSMENT — CONF VISUAL FIELD
OS_NORMAL: 1
METHOD: COUNTING FINGERS
OD_NORMAL: 1

## 2018-09-25 ASSESSMENT — REFRACTION_WEARINGRX
OS_SPHERE: -0.25
OS_CYLINDER: SPHERE
SPECS_TYPE: SVL
OD_SPHERE: -2.00
OD_CYLINDER: SPHERE

## 2018-09-25 ASSESSMENT — REFRACTION_MANIFEST
OD_SPHERE: -2.25
OS_SPHERE: -0.25
OD_CYLINDER: SPHERE
OS_CYLINDER: SPHERE

## 2018-09-25 ASSESSMENT — VISUAL ACUITY
CORRECTION_TYPE: GLASSES
OD_CC: J1+
METHOD: SNELLEN - LINEAR
OS_CC: J1+
OS_CC: 20/20
OD_CC: 20/20

## 2018-09-25 ASSESSMENT — TONOMETRY
OD_IOP_MMHG: 20
IOP_METHOD: ICARE
OS_IOP_MMHG: 19

## 2018-09-25 ASSESSMENT — EXTERNAL EXAM - LEFT EYE: OS_EXAM: NORMAL

## 2018-09-25 ASSESSMENT — CUP TO DISC RATIO
OS_RATIO: 0.2
OD_RATIO: 0.2

## 2018-09-25 ASSESSMENT — EXTERNAL EXAM - RIGHT EYE: OD_EXAM: NORMAL

## 2018-09-25 NOTE — MR AVS SNAPSHOT
After Visit Summary   9/25/2018    Juanita Gonzalez    MRN: [id number]           Patient Information     Date Of Birth          1995        Visit Information        Provider Department      9/25/2018 4:20 PM Demetrius Joshi OD [facility]        Today's Diagnoses     Myopia of both eyes    -  1    Meibomian gland dysfunction           Follow-ups after your visit        Follow-up notes from your care team     Return in about 1 year (around 9/25/2019) for Comprehensive Eye Exam.      Who to contact     Please call your clinic at [phone] to:    Ask questions about your health    Make or cancel appointments    Discuss your medicines    Learn about your test results    Speak to your doctor            Additional Information About Your Visit        [employer] Information     [employer] gives you secure access to your electronic health record. If you see a primary care provider, you can also send messages to your care team and make appointments. If you have questions, please call your primary care clinic.  If you do not have a primary care provider, please call [phone] and they will assist you.      [employer] is an electronic gateway that provides easy, online access to your medical records. With [employer], you can request a clinic appointment, read your test results, renew a prescription or communicate with your care team.     To access your existing account, please contact your [facility] or call [phone] for assistance.        Care EveryWhere ID     This is your Care EveryWhere ID. This could be used by other organizations to access your [facility] medical records  [id number]         Blood Pressure from Last 3 Encounters:   08/08/18 118/70   04/10/18 127/78   02/27/18 120/75    Weight from Last 3 Encounters:   08/08/18 112.5 kg (248 lb)   02/27/18 116.2 kg (256 lb 3.2 oz)   09/06/17 114 kg (251 lb 6.4 oz) (>99 %)*     * Growth percentiles are based  on [facility] 2-20 Years data.              We Performed the Following     REFRACTION [57403]        Primary Care Provider Office Phone # Fax #    Jose Luis Millan [phone]       [address] [address]        Equal Access to Services     ANILELIANA BLADIMIR : Hadii aad ku hadyelenao Soomaali, waaxda luqadaha, qaybta kaalmada adeegyada, waxay idiin hayaan adetheresa couch lanathaliedawood rendon. So [facility] [phone].    ATENCIÓN: Si habla español, tiene a braswell disposición servicios gratuitos de asistencia lingüística. Llame al [phone].    We comply with applicable federal civil rights laws and [place] laws. We do not discriminate on the basis of race, color, national origin, age, disability, sex, sexual orientation, or gender identity.            Thank you!     Thank you for choosing [facility]  for your care. Our goal is always to provide you with excellent care. Hearing back from our patients is one way we can continue to improve our services. Please take a few minutes to complete the written survey that you may receive in the mail after your visit with us. Thank you!             Your Updated Medication List - Protect others around you: Learn how to safely use, store and throw away your medicines at www.disposemymeds.org.          This list is accurate as of 9/25/18  5:41 PM.  Always use your most recent med list.                   Brand Name Dispense Instructions for use Diagnosis    albuterol 108 (90 Base) MCG/ACT inhaler    PROAIR HFA    1 Inhaler    Inhale 2 puffs into the lungs every 6 hours    Mild intermittent asthma without complication       amphetamine-dextroamphetamine 20 MG per 24 hr capsule    ADDERALL XR    30 capsule    Take 1 capsule (20 mg) by mouth daily    Attention deficit hyperactivity disorder (ADHD), predominantly inattentive type       cetirizine 10 MG tablet    zyrTEC    30 tablet    Take 1 tablet (10 mg) by mouth daily    Mild intermittent asthma without complication,  Seasonal allergic rhinitis, unspecified chronicity, unspecified trigger, SOB (shortness of breath), Cough       drospirenone-ethinyl estradiol 3-0.03 MG per tablet    ARELIS 28    90 tablet    Take 1 tablet by mouth daily DISPENSE 3 MONTH supply at a time please    Acne vulgaris       montelukast 10 MG tablet    SINGULAIR    90 tablet    Take 1 tablet (10 mg) by mouth At Bedtime    Seasonal allergic rhinitis, unspecified chronicity, unspecified trigger, SOB (shortness of breath), Cough       nystatin [id number] UNIT/GM Powd    MYCOSTATIN    60 g    Apply topically 3 times daily as needed    Bumps on skin, Yeast infection of the skin

## 2018-09-25 NOTE — NURSING NOTE
Chief Complaints and History of Present Illnesses   Patient presents with     Annual Eye Exam     HPI    Affected eye(s):  Both   Symptoms:     No blurred vision      Frequency:  Constant       Do you have eye pain now?:  No      Comments:  Vision a little worse both eyes. Used to just be the right eye. Patient denies eye pain or irritation. Does not use any eye drops.    Does get headaches from squinting at distance, once a week or so.    Astrid Ledezma COT 4:20 PM September 25, 2018

## 2018-09-25 NOTE — PROGRESS NOTES
Assessment/Plan  (H52.13) Myopia of both eyes  (primary encounter diagnosis)  Comment: Myopia OU  Plan: REFRACTION [23306]         Educated patient on condition and clinical findings. Dispensed spectacle prescription for full time wear. Educated patient on possibility of adaptation period, if symptoms do not improve return to clinic for further testing.    Recommended full time wear given blurred vision without correction. Patient to follow-up for contact lens fitting as needed.    (H02.89) Meibomian gland dysfunction  Comment: Mild signs, possibly causing intermittent blur  Plan:  Recommended warm compresses and artificial tears as needed. Monitor annually, or sooner if symptoms worsen.    Return to clinic in 1 year for comprehensive eye exam.    Complete documentation of historical and exam elements from today's encounter can  be found in the full encounter summary report (not reduplicated in this progress  note). I personally obtained the chief complaint(s) and history of present illness. I  confirmed and edited as necessary the review of systems, past medical/surgical  history, family history, social history, and examination findings as documented by  others; and I examined the patient myself. I personally reviewed the relevant tests,  images, and reports as documented above. I formulated and edited as necessary the  assessment and plan and discussed the findings and management plan with the  patient and family.    Demetrius Joshi OD, FAAO

## 2018-12-01 ENCOUNTER — HEALTH MAINTENANCE LETTER (OUTPATIENT)
Age: 23
End: 2018-12-01

## 2018-12-05 ENCOUNTER — THERAPY VISIT (OUTPATIENT)
Dept: CHIROPRACTIC MEDICINE | Facility: CLINIC | Age: 23
End: 2018-12-05
Payer: COMMERCIAL

## 2018-12-05 DIAGNOSIS — M99.02 THORACIC SEGMENT DYSFUNCTION: Primary | ICD-10-CM

## 2018-12-05 DIAGNOSIS — M54.6 PAIN IN THORACIC SPINE: ICD-10-CM

## 2018-12-05 DIAGNOSIS — M62.838 SPASM OF MUSCLE: ICD-10-CM

## 2018-12-05 PROCEDURE — 98940 CHIROPRACT MANJ 1-2 REGIONS: CPT | Mod: AT | Performed by: CHIROPRACTOR

## 2018-12-05 NOTE — PROGRESS NOTES
Visit #:  13    Subjective:  Juanita Gonzalez is a 23 year old female who is seen in f/u up for:        Thoracic segment dysfunction  Pain in thoracic spine  Spasm of muscle.     Since last visit on 7/26/2018,  Juanita Gonzalez reports:    Area of chief complaint:  Thoracic :  Symptoms are graded at 4/10. The quality is described as stiff, achey.  Motion has decreased over the last couple of weeks. Patient feels that they were doing well until her back began to stiffen up due to work and school stress.        Objective:  The following was observed:    P: palpatory tendernessT-spine paraspinal Bilaterally  A: static palpation demonstrates intersegmental asymmetry , thoracic  R: motion palpation notes restricted motion, T4 , T5 , T6  and T7   T: hypertonicity at: T-spine paraspinal Bilaterally    Segmental spinal dysfunction/restrictions found at:  T4 , T5 , T6  and T7       Assessment:    Diagnoses:      1. Thoracic segment dysfunction    2. Pain in thoracic spine    3. Spasm of muscle        Patient's condition:  Patient had restrictions pre-manipulation    Treatment effectiveness:  Post manipulation there is better intersegmental movement and Patient claims to feel looser post manipulation      Procedures:  CMT:  90943 Chiropractic manipulative treatment 1-2 regions performed   Thoracic: Diversified, T5, T6, T7, T8, Prone    Modalities:  None performed this visit    Therapeutic procedures:  None    Response to Treatment  Reduction in symptoms as reported by patient    Prognosis: Good    Progress towards Goals:      Recommendations:    Instructions:  ice 20 minutes every other hour as needed    Follow-up:    Return to care if symptoms persist.

## 2019-01-03 NOTE — TELEPHONE ENCOUNTER
Form received.  Given to Team Elizabeth RN for review.  Please mail forms via attached self addresses stamped envelope after completion.    Elham Cooper,       
Forms completed, signed, copy made for chart and mailed as indicated.    Elham Cooper,       
Insurance Form received via drop-off. Form to be completed and mailed to Patient (attatched envelope). Form placed in Jose Luis Millan M.D. green folder at the .    Last [facility]: 11/30/16   Provider: Monty  Sibling (? Of ?): 1 of 1  CARYN attached (Y/N)? no          
Placed in PCP folder for signature.  Jia Middleton RN    
English

## 2019-02-27 ENCOUNTER — ALLIED HEALTH/NURSE VISIT (OUTPATIENT)
Dept: NURSING | Facility: CLINIC | Age: 24
End: 2019-02-27

## 2019-02-27 DIAGNOSIS — Z23 NEED FOR TD VACCINE: Primary | ICD-10-CM

## 2019-02-27 PROCEDURE — 90714 TD VACC NO PRESV 7 YRS+ IM: CPT

## 2019-02-27 PROCEDURE — 86580 TB INTRADERMAL TEST: CPT

## 2019-02-27 PROCEDURE — 90471 IMMUNIZATION ADMIN: CPT

## 2019-02-27 NOTE — NURSING NOTE
Screening Questionnaire for Adult Immunization    Are you sick today?   No   Do you have allergies to medications, food, a vaccine component or latex?   Yes   Have you ever had a serious reaction after receiving a vaccination?   No   Do you have a long-term health problem with heart disease, lung disease, asthma, kidney disease, metabolic disease (e.g. diabetes), anemia, or other blood disorder?   No   Do you have cancer, leukemia, HIV/AIDS, or any other immune system problem?   No   In the past 3 months, have you taken medications that affect  your immune system, such as prednisone, other steroids, or anticancer drugs; drugs for the treatment of rheumatoid arthritis, Crohn s disease, or psoriasis; or have you had radiation treatments?   No   Have you had a seizure, or a brain or other nervous system problem?   No   During the past year, have you received a transfusion of blood or blood     products, or been given immune (gamma) globulin or antiviral drug?   No   For women: Are you pregnant or is there a chance you could become        pregnant during the next month?   No   Have you received any vaccinations in the past 4 weeks?   No     Immunization questionnaire was positive for at least one answer.  Notified Dr. Doyle.        Per orders of Dr. Gutierrez, injection of TD and Mantoux Placement given by Maximino Valencia. Patient instructed to remain in clinic for 15 minutes afterwards, and to report any adverse reaction to me immediately.       Screening performed by Maximino Valencia on 2/27/2019 at 10:42 AM.   Prior to injection, verified patient identity using patient's name and date of birth.  Due to injection administration, patient instructed to remain in clinic for 15 minutes  afterwards, and to report any adverse reaction to me immediately.    TD Vaccine    Drug Amount Wasted:  None.  Vial/Syringe: Syringe  Expiration Date:  10/23/2020  Maximino Valencia MA on 2/27/2019 at 11:01 AM

## 2019-02-27 NOTE — PROGRESS NOTES
The patient is asked the following questions today and these are her answers:    -Have you had a mantoux administered in the past 30 days?    No  -Have you had a previous positive Mantoux.  No  -Have you received BCG in the past.  No  -Have you had a live vaccine  (MMR, Varicella, OPV, Yellow Fever) in the last 6 weeks.  No  -Have you had and active  viral or bacterial infection in the past 6 weeks.  No  -Have you received corticosteroids or immunosuppressive agents in the past 6 weeks.  No  -Have you been diagnosed with HIV?  No  -Do you have a maglinancy?  No     Prior to injection, verified patient identity using patient's name and date of birth.  Due to injection administration, patient instructed to remain in clinic for 15 minutes  afterwards, and to report any adverse reaction to me immediately.    Mantoux Placement    Drug Amount Wasted:  None.  Vial/Syringe: Multi dose vial  Expiration Date:  04/08/2021  Maximino Valecnia MA on 2/27/2019 at 10:59 AM

## 2019-03-01 ENCOUNTER — ALLIED HEALTH/NURSE VISIT (OUTPATIENT)
Dept: NURSING | Facility: CLINIC | Age: 24
End: 2019-03-01

## 2019-03-01 DIAGNOSIS — Z11.1 VISIT FOR MANTOUX TEST: Primary | ICD-10-CM

## 2019-03-01 LAB
PPDINDURATION: 0 MM (ref 0–5)
PPDREDNESS: 0 MM

## 2019-03-01 PROCEDURE — 99207 ZZC NO CHARGE NURSE ONLY: CPT

## 2019-03-01 NOTE — LETTER
3/1/19      Regarding Juanita Gonzalez birthdate 1995.    Patients mantoux read was negative today.    Etta Galloway, PAULETTEN,RN [facility] Triage.

## 2019-03-01 NOTE — NURSING NOTE
Mantoux results: No induration.  No swelling.  No redness.    Etta Galloway, RN [facility] Triage.

## 2019-03-25 ENCOUNTER — THERAPY VISIT (OUTPATIENT)
Dept: CHIROPRACTIC MEDICINE | Facility: CLINIC | Age: 24
End: 2019-03-25
Payer: MEDICAID

## 2019-03-25 DIAGNOSIS — M99.03 SEGMENTAL DYSFUNCTION OF LUMBAR REGION: ICD-10-CM

## 2019-03-25 DIAGNOSIS — M54.6 PAIN IN THORACIC SPINE: ICD-10-CM

## 2019-03-25 DIAGNOSIS — M99.05 SEGMENTAL DYSFUNCTION OF PELVIC REGION: ICD-10-CM

## 2019-03-25 DIAGNOSIS — M54.50 LUMBAGO: ICD-10-CM

## 2019-03-25 DIAGNOSIS — M99.02 THORACIC SEGMENT DYSFUNCTION: Primary | ICD-10-CM

## 2019-03-25 PROCEDURE — 98941 CHIROPRACT MANJ 3-4 REGIONS: CPT | Mod: AT | Performed by: CHIROPRACTOR

## 2019-03-25 NOTE — PROGRESS NOTES
Visit #:  1 in 2019    Subjective:  Juanita Gonzalez is a 23 year old female who is seen in f/u up for:        Thoracic segment dysfunction  Pain in thoracic spine  Segmental dysfunction of lumbar region  Lumbago  Segmental dysfunction of pelvic region.     Since last visit on 12/5/2018,  Juanita Gonzalez reports:    Area of chief complaint:  Thoracic and Lumbar :  Symptoms are graded at 6/10. The quality is described as stiff, achey.  Motion has decreased over the past couple of months. Patient feels that they were doing well until this past month when her back started stiffening up.  She relates this to the stress of schools and finals.      Objective:  The following was observed:    P: palpatory tenderness Piriformis and Traps R>>L and Bilaterally  A: static palpation demonstrates intersegmental asymmetry , thoracic, lumbar, pelvis  R: motion palpation notes restricted motion, T4 , T5 , T6 , T7 , L4 , L5  and PSIS Right   T: hypertonicity at: Piriformis and Traps R>>L and Bilaterally    Segmental spinal dysfunction/restrictions found at:  T4 , T5 , T6 , T7 , L4 , L5  and PSIS Right       Assessment:    Diagnoses:      1. Thoracic segment dysfunction    2. Pain in thoracic spine    3. Segmental dysfunction of lumbar region    4. Lumbago    5. Segmental dysfunction of pelvic region        Patient's condition:  Patient had restrictions pre-manipulation    Treatment effectiveness:  Post manipulation there is better intersegmental movement and Patient claims to feel looser post manipulation      Procedures:  CMT:  03846 Chiropractic manipulative treatment 3-4 regions performed   Thoracic: Diversified, T5, T6, T7, Supine  Lumbar: Diversified, L4, L5, Side posture  Pelvis: Drop Table, PSIS Right , Side posture    Modalities:  None performed this visit    Therapeutic procedures:  None    Response to Treatment  Reduction in symptoms as reported by patient    Prognosis: Good    Progress towards Goals: Patient  is making progress towards the goal.     Recommendations:    Instructions:  ice 20 minutes every other hour as needed    Follow-up:    Return to care as symptoms persist.

## 2019-04-15 ENCOUNTER — OFFICE VISIT (OUTPATIENT)
Dept: FAMILY MEDICINE | Facility: CLINIC | Age: 24
End: 2019-04-15
Payer: MEDICAID

## 2019-04-15 VITALS
HEART RATE: 87 BPM | BODY MASS INDEX: 42.99 KG/M2 | TEMPERATURE: 98.2 F | OXYGEN SATURATION: 99 % | DIASTOLIC BLOOD PRESSURE: 77 MMHG | HEIGHT: 65 IN | SYSTOLIC BLOOD PRESSURE: 124 MMHG | WEIGHT: 258 LBS

## 2019-04-15 DIAGNOSIS — G56.03 BILATERAL CARPAL TUNNEL SYNDROME: ICD-10-CM

## 2019-04-15 DIAGNOSIS — T78.40XA ALLERGIC REACTION, INITIAL ENCOUNTER: Primary | ICD-10-CM

## 2019-04-15 PROCEDURE — 99214 OFFICE O/P EST MOD 30 MIN: CPT | Performed by: FAMILY MEDICINE

## 2019-04-15 RX ORDER — CETIRIZINE HYDROCHLORIDE 10 MG/1
10 TABLET ORAL DAILY
Qty: 90 TABLET | Refills: 1 | Status: SHIPPED | OUTPATIENT
Start: 2019-04-15 | End: 2019-07-11

## 2019-04-15 RX ORDER — DICLOFENAC SODIUM 75 MG/1
75 TABLET, DELAYED RELEASE ORAL 2 TIMES DAILY PRN
Qty: 40 TABLET | Refills: 0 | Status: SHIPPED | OUTPATIENT
Start: 2019-04-15 | End: 2019-07-11

## 2019-04-15 RX ORDER — TRIAMCINOLONE ACETONIDE 1 MG/G
CREAM TOPICAL
Qty: 80 G | Refills: 0 | Status: SHIPPED | OUTPATIENT
Start: 2019-04-15 | End: 2019-07-11

## 2019-04-15 ASSESSMENT — PAIN SCALES - GENERAL: PAINLEVEL: NO PAIN (0)

## 2019-04-15 ASSESSMENT — MIFFLIN-ST. JEOR: SCORE: 1929.28

## 2019-04-15 NOTE — PATIENT INSTRUCTIONS
Patient Education     Discharge Instructions for Carpal Tunnel Release  You had a carpal tunnel release procedure to help ease the symptoms of carpal tunnel syndrome. In carpal tunnel syndrome, a nerve in the wrist is compressed and irritated. This causes numbness and pain in the fingers and hand. Carpal tunnel release eases the compression of the nerve. Here are instructions that will help you care for your arm and wrist when you are at home.  Home care    Don't  objects tightly or lift with your affected arm.    Wear your bandage, splint, or cast as directed by your doctor.    Always keep the dressing, splint, or cast dry and clean.    When showering, cover your hand and wrist with plastic and use tape or rubber bands to keep the dressing, splint, or cast dry. Shower as needed.      Use an ice pack, bag of frozen peas, or something similar wrapped in a thin towel on your wrist. Use it to reduce swelling for the first 48 hours. Leave the ice pack on for 20 minutes, then take it off for 20 minutes. Repeat as needed.    Keep your arm elevated above your heart for 24 to 48 hours after surgery.    Do the exercises you learned in the hospital, or as instructed by your doctor.    Take pain medicine as directed.    Don t drive until your doctor says it s OK. Never drive while you are taking opioid pain medicine.    Ask your doctor when you can return to work. If your job requires heavy lifting, you may not be able to begin working again for several weeks.  Follow-up care  Make a follow-up appointment as directed by your doctor.  Call 911  Call 911 right away if you have any of the following:    Chest pain    Shortness of breath  When to call your healthcare provider  Call your healthcare provider right away if you have any of the following:    A splint, cast, or dressing that is wet    Increased bleeding or drainage from the cut (incision)    Opening of the incision    Fever of 100.4 F (38 C) or higher, or as  directed by your healthcare provider    Shaking chills    Any new numbness in the fingers or thumb    Blue hand or fingers    Pain gets worse with or without activity    Redness, tenderness, or swelling of the incision gets worse   Date Last Reviewed: 6/1/2018 2000-2018 [employer]. [address] [place] [address]. All rights reserved. This information is not intended as a substitute for professional medical care. Always follow your healthcare professional's instructions.           Patient Education     Carpal Tunnel Syndrome Prevention Tips  Carpal tunnel syndrome is a painful condition in the hand and wrist. It occurs when there is too much pressure on the median nerve at the wrist. The median nerve runs from your forearm to the palm of your hand. It may get squeezed or pressed when it passes through the carpal tunnel from your wrist to your hand. You may then feel numbness, tingling, pain, or weakness in your hand and up your forearm.  Doing the same hand activities over and over can put you at higher risk for carpal tunnel syndrome. But you can reduce your risk. Learn how to change the way you use your hands. Below are tips for at home and on the job. Also follow the hand and wrist safety policies at your workplace.      Keep your wrist in a straight (neutral) position when exercising.      Keep your wrist in neutral  Keep a straight (neutral) wrist position as often as you can. Don t use your wrist in a bent (flexed) position for long periods of time. This includes extended or twisted positions.  When you sleep, don't have your wrist flexed (don't sleep all curled up on your side). And don't put extra pressure on your wrist for long periods of time (don't sleep on your stomach with your hands under you).  Watch your   Don t use only your thumb and index finger to grasp or lift something. This can put stress on your wrist. When you can, use your whole hand and all its fingers to  grasp an object.  Minimize repetition  Don t move your arms or hands the same way for long periods of time. And don't hold an object in the same way for long periods of time. Even simple, light tasks can cause injury this way. Instead, switch tasks or switch hands.  Rest your hands  Give your hands a break from time to time with a rest. Even a few minutes once an hour can help.  Reduce speed and force  Slow down when you do a forceful, repetitive motion. This gives your wrist time to recover from the effort. Use power tools to help reduce the force.  Strengthen the muscles  Weak muscles may lead to a poor wrist or arm position. Exercises will make your hand and arm muscles stronger. This can help you keep a better position.  Date Last Reviewed: 1/1/2018 2000-2018 [employer]. [address] [place] [address]. All rights reserved. This information is not intended as a substitute for professional medical care. Always follow your healthcare professional's instructions.           Patient Education     Carpal Tunnel Syndrome    Carpal tunnel syndrome is a painful condition of the wrist and arm. It is caused by pressure on the median nerve. The median nerve is one of the nerves that give feeling and movement to the hand. It passes through a tunnel in the wrist called the carpal tunnel. This tunnel is made up of bones and ligaments. Narrowing of this tunnel or swelling of the tissues inside the tunnel puts pressure on the median nerve. This causes numbness, pins and needles, or electric shooting pains in your hand and forearm. Often the pain is worse at night and may wake you when you are asleep.  Carpal tunnel syndrome may occur during pregnancy and with use of birth control pills. It is more common in workers who must often bend their wrists. It is also common in people who work with power tools that cause strong vibrations.  Home care    Rest the painful wrist. Avoid repeated bending of the  wrist back and forth. This puts pressure on the median nerve. Avoid using power tools with strong vibrations.    If you were given a splint, wear it at night while you sleep. You may also wear it during the day for comfort.    Move your fingers and wrists often to prevent stiffness.    Elevate your arms on pillows when you lie down.    Try using the unaffected hand more.    Try not to hold your wrists in a bent, downward position.    Sometimes changes in the work place may ease symptoms. If you type most of the day, it may help to change the position of your keyboard or add a wrist support. Your wrist should be in a neutral position and not bent back when typing.    You may use over-the-counter pain medicine to treat pain and inflammation, unless another medicine was prescribed. Anti-inflammatory pain medicines, such as ibuprofen or naproxen may be more effective than acetaminophen, which treats pain, but not inflammation. If you have chronic liver or kidney disease or ever had a stomach ulcer or gastrointestinal bleeding, talk with your healthcare provider before using these medicines.    Opioid pain medicine will only give temporary relief and does not treat the problem. If pain continues, you may need a shot of a steroid drug into your wrist.    If the above methods fail, you may need surgery. This will open the carpal tunnel and release the pressure on the trapped nerve.  Follow-up care  Follow up with your healthcare provider, or as advised. If X-rays were taken, you will be notified of any new findings that may affect your care.     When to seek medical advice  Call your healthcare provider right away if any of these occur:    Pain not improving with the above treatment    Fingers or hand become cold, blue, numb, or tingly    Your whole arm becomes swollen or weak   Date Last Reviewed: 5/1/2018 2000-2018 [employer]. [address] [place] [address], [place] [address]. All rights reserved. This  information is not intended as a substitute for professional medical care. Always follow your healthcare professional's instructions.

## 2019-04-15 NOTE — PROGRESS NOTES
SUBJECTIVE:   Juanita Gonzalez is a 23 year old female who presents to clinic today for the following   health issues:  Patient comes in today with allergic reaction to mosquito, insect bites she reports she has traveled to [place] over the weekend.  She had a lot of mosquito bite.  She reports right upper hand and has been swelling and redness.  She reports she tried Benadryl is not helping.  She denies any fever, she has no chills or night sweats.    She also concerned about carpal tunnel she reports that both of her hand pain, tingling.  She reports her left hand is worse than her right hand.  She reports numbness and tingling worse at night when she tries to sleep.  She denies any joint stiffness or pain she has no elbow pain.    Concern - Bug bite  Onset: Friday    Description:   Swollen right arm    Intensity: severe    Progression of Symptoms:  same    Accompanying Signs & Symptoms:  Itching    Previous history of similar problem:   Yes    Precipitating factors:   Worsened by: movement or pressure to arm    Alleviating factors:  Improved by: None    Therapies Tried and outcome: OTC allergy medication and Ibuprofen; did not work      Additional history: as documented    Reviewed  and updated as needed this visit by clinical staff  Tobacco  Allergies  Meds  Med Hx  Surg Hx  Fam Hx  Soc Hx        Reviewed and updated as needed this visit by Provider         Patient Active Problem List   Diagnosis     Obesity     Attention deficit hyperactivity disorder (ADHD)     Disturbance in sleep behavior     Acanthosis nigricans     Acne     Seasonal allergies     Eczema     Vitamin D deficiency     Insulin resistance     Anisometropia     Dysmetabolic syndrome X     Myopia     Enchondroma of wrist or hand     Osteoma     Café au lait spot     Neck pain     Bilateral thoracic back pain     Past Surgical History:   Procedure Laterality Date     DENTAL SURGERY       GRAFT BONE TO FINGER  3/18/2013    Procedure:  GRAFT BONE TO FINGER;  Right Second Metacarpal Curettage and Allograft;  Surgeon: Jeremiah Yu MD;  [facility]     HC TOOTH EXTRACTION W/FORCEP       LE FORT ONE N/A 6/11/2015    Procedure: LE FORT ONE;  Surgeon: Vitaliy Yun DDS;  [facility]     LE FORT ONE , OSTEOTOMY SAGITTAL SPLIT, COMBINED N/A 12/3/2014    Procedure: COMBINED LE FORT ONE, OSTEOTOMY SAGITTAL SPLIT;  Surgeon: Vitaliy Yun DDS;  Location[facility]     TONGUE SURGERY  9/17/2014    tongue lesion removed       Social History     Tobacco Use     Smoking status: Never Smoker     Smokeless tobacco: Never Used   Substance Use Topics     Alcohol use: Yes     Comment: Occasional with friends.     Family History   Problem Relation Age of Onset     Asthma Mother      Obesity Father      Hypertension Maternal Grandmother      Cancer Maternal Grandfather      Hypertension Paternal Grandmother      Glaucoma Other         Mat Great Aunt/blindness     Glaucoma Maternal Aunt      Macular Degeneration No family hx of          Current Outpatient Medications   Medication Sig Dispense Refill     cetirizine (ZYRTEC) 10 MG tablet Take 1 tablet (10 mg) by mouth daily 90 tablet 1     diclofenac (VOLTAREN) 75 MG EC tablet Take 1 tablet (75 mg) by mouth 2 times daily as needed for moderate pain 40 tablet 0     order for DME Equipment being ordered: Cock up , wrist splints   Carpal tunnel syndrome, bilaterally. 2 Units 0     triamcinolone (KENALOG) 0.1 % external cream Apply to area tid for 5- 7 days 80 g 0     albuterol (PROAIR HFA) 108 (90 Base) MCG/ACT Inhaler Inhale 2 puffs into the lungs every 6 hours (Patient not taking: Reported on 4/15/2019) 1 Inhaler 0     amphetamine-dextroamphetamine (ADDERALL XR) 20 MG per 24 hr capsule Take 1 capsule (20 mg) by mouth daily (Patient not taking: Reported on 8/8/2018) 30 capsule 0     cetirizine (ZYRTEC) 10 MG tablet Take 1 tablet (10 mg) by mouth daily (Patient not taking: Reported on 4/15/2019) 30  "tablet 1     drospirenone-ethinyl estradiol (ARELIS 28) 3-0.03 MG per tablet Take 1 tablet by mouth daily DISPENSE 3 MONTH supply at a time please (Patient not taking: Reported on 4/15/2019) 90 tablet 3     montelukast (SINGULAIR) 10 MG tablet Take 1 tablet (10 mg) by mouth At Bedtime (Patient not taking: Reported on 4/15/2019) 90 tablet 0     nystatin (MYCOSTATIN) 357346 UNIT/GM POWD Apply topically 3 times daily as needed (Patient not taking: Reported on 4/15/2019) 60 g 1     Allergies   Allergen Reactions     Bactrim Hives     Recent Labs   Lab Test 09/23/15  2338 02/11/15  0739 06/11/13  1009   A1C  --  5.4  --    LDL  --   --  74   HDL  --   --  36*   TRIG  --   --  62   CR 0.70  --   --    GFRESTIMATED >90  Non  GFR Calc    --   --    GFRESTBLACK >90   GFR Calc    --   --    POTASSIUM 3.4  --   --    TSH  --  0.99 2.61        ROS:  Constitutional, HEENT, cardiovascular, pulmonary, gi and gu systems are negative, except as otherwise noted.    OBJECTIVE:     /77 (BP Location: Right arm, Patient Position: Chair, Cuff Size: Adult Large)   Pulse 87   Temp 98.2  F (36.8  C) (Oral)   Ht 1.64 m (5' 4.57\")   Wt 117 kg (258 lb)   SpO2 99%   Breastfeeding? No   BMI 43.51 kg/m    Body mass index is 43.51 kg/m .  GENERAL APPEARANCE: healthy, alert and no distress  CV: regular rates and rhythm  ORTHO: Wrist Exam: WRIST:  Inspection: no swelling  Palpation: Tender: Tenderness: over carpal tunnel ligaments.  Non-tender: rest of wrist exam  Range of Motion: normal  Strength: no deficits  Special tests: positive Tinel's at carpal tunnel.  , positive Phalen's.  Bilaterally.    ELBOW:  elbow exam not done    SKIN: Right arm, forearm, spot of redness, and warm to touch.  NEURO: Normal strength and tone and mentation intact    Diagnostic Test Results:  none     ASSESSMENT/PLAN:       ICD-10-CM    1. Allergic reaction, initial encounter T78.40XA triamcinolone (KENALOG) 0.1 % external " cream     cetirizine (ZYRTEC) 10 MG tablet   2. Bilateral carpal tunnel syndrome G56.03 diclofenac (VOLTAREN) 75 MG EC tablet     order for DME   She does have allergic reaction likely to insect bite.  She was advised to take Zyrtec 1 tablet daily.  In addition, I gave her Kenalog cream use has been prescribed.    #2 Bilateral Carpal Tunl. left worse than the right.  She was advised with home exercise.  She was fitted with a Cockup splint he was has been prescribed.  She was given diclofenac use has been prescribed with food.    I did discuss with the patient to do with daily home exercise with her cock-up splint.  She could follow-up in 4-6 weeks if symptoms worsening or no improvement    Patient Instructions       Patient Education     Discharge Instructions for Carpal Tunnel Release  You had a carpal tunnel release procedure to help ease the symptoms of carpal tunnel syndrome. In carpal tunnel syndrome, a nerve in the wrist is compressed and irritated. This causes numbness and pain in the fingers and hand. Carpal tunnel release eases the compression of the nerve. Here are instructions that will help you care for your arm and wrist when you are at home.  Home care    Don't  objects tightly or lift with your affected arm.    Wear your bandage, splint, or cast as directed by your doctor.    Always keep the dressing, splint, or cast dry and clean.    When showering, cover your hand and wrist with plastic and use tape or rubber bands to keep the dressing, splint, or cast dry. Shower as needed.      Use an ice pack, bag of frozen peas, or something similar wrapped in a thin towel on your wrist. Use it to reduce swelling for the first 48 hours. Leave the ice pack on for 20 minutes, then take it off for 20 minutes. Repeat as needed.    Keep your arm elevated above your heart for 24 to 48 hours after surgery.    Do the exercises you learned in the hospital, or as instructed by your doctor.    Take pain medicine as  directed.    Don t drive until your doctor says it s OK. Never drive while you are taking opioid pain medicine.    Ask your doctor when you can return to work. If your job requires heavy lifting, you may not be able to begin working again for several weeks.  Follow-up care  Make a follow-up appointment as directed by your doctor.  Call 911  Call 911 right away if you have any of the following:    Chest pain    Shortness of breath  When to call your healthcare provider  Call your healthcare provider right away if you have any of the following:    A splint, cast, or dressing that is wet    Increased bleeding or drainage from the cut (incision)    Opening of the incision    Fever of 100.4 F (38 C) or higher, or as directed by your healthcare provider    Shaking chills    Any new numbness in the fingers or thumb    Blue hand or fingers    Pain gets worse with or without activity    Redness, tenderness, or swelling of the incision gets worse   Date Last Reviewed: 6/1/2018 2000-2018 [employer]. [address] [place] [address]. All rights reserved. This information is not intended as a substitute for professional medical care. Always follow your healthcare professional's instructions.           Patient Education     Carpal Tunnel Syndrome Prevention Tips  Carpal tunnel syndrome is a painful condition in the hand and wrist. It occurs when there is too much pressure on the median nerve at the wrist. The median nerve runs from your forearm to the palm of your hand. It may get squeezed or pressed when it passes through the carpal tunnel from your wrist to your hand. You may then feel numbness, tingling, pain, or weakness in your hand and up your forearm.  Doing the same hand activities over and over can put you at higher risk for carpal tunnel syndrome. But you can reduce your risk. Learn how to change the way you use your hands. Below are tips for at home and on the job. Also follow the hand and  wrist safety policies at your workplace.      Keep your wrist in a straight (neutral) position when exercising.      Keep your wrist in neutral  Keep a straight (neutral) wrist position as often as you can. Don t use your wrist in a bent (flexed) position for long periods of time. This includes extended or twisted positions.  When you sleep, don't have your wrist flexed (don't sleep all curled up on your side). And don't put extra pressure on your wrist for long periods of time (don't sleep on your stomach with your hands under you).  Watch your   Don t use only your thumb and index finger to grasp or lift something. This can put stress on your wrist. When you can, use your whole hand and all its fingers to grasp an object.  Minimize repetition  Don t move your arms or hands the same way for long periods of time. And don't hold an object in the same way for long periods of time. Even simple, light tasks can cause injury this way. Instead, switch tasks or switch hands.  Rest your hands  Give your hands a break from time to time with a rest. Even a few minutes once an hour can help.  Reduce speed and force  Slow down when you do a forceful, repetitive motion. This gives your wrist time to recover from the effort. Use power tools to help reduce the force.  Strengthen the muscles  Weak muscles may lead to a poor wrist or arm position. Exercises will make your hand and arm muscles stronger. This can help you keep a better position.  Date Last Reviewed: 1/1/2018 2000-2018 [employer]. [address] [place] [address]. All rights reserved. This information is not intended as a substitute for professional medical care. Always follow your healthcare professional's instructions.           Patient Education     Carpal Tunnel Syndrome    Carpal tunnel syndrome is a painful condition of the wrist and arm. It is caused by pressure on the median nerve. The median nerve is one of the nerves that give  feeling and movement to the hand. It passes through a tunnel in the wrist called the carpal tunnel. This tunnel is made up of bones and ligaments. Narrowing of this tunnel or swelling of the tissues inside the tunnel puts pressure on the median nerve. This causes numbness, pins and needles, or electric shooting pains in your hand and forearm. Often the pain is worse at night and may wake you when you are asleep.  Carpal tunnel syndrome may occur during pregnancy and with use of birth control pills. It is more common in workers who must often bend their wrists. It is also common in people who work with power tools that cause strong vibrations.  Home care    Rest the painful wrist. Avoid repeated bending of the wrist back and forth. This puts pressure on the median nerve. Avoid using power tools with strong vibrations.    If you were given a splint, wear it at night while you sleep. You may also wear it during the day for comfort.    Move your fingers and wrists often to prevent stiffness.    Elevate your arms on pillows when you lie down.    Try using the unaffected hand more.    Try not to hold your wrists in a bent, downward position.    Sometimes changes in the work place may ease symptoms. If you type most of the day, it may help to change the position of your keyboard or add a wrist support. Your wrist should be in a neutral position and not bent back when typing.    You may use over-the-counter pain medicine to treat pain and inflammation, unless another medicine was prescribed. Anti-inflammatory pain medicines, such as ibuprofen or naproxen may be more effective than acetaminophen, which treats pain, but not inflammation. If you have chronic liver or kidney disease or ever had a stomach ulcer or gastrointestinal bleeding, talk with your healthcare provider before using these medicines.    Opioid pain medicine will only give temporary relief and does not treat the problem. If pain continues, you may need a shot  of a steroid drug into your wrist.    If the above methods fail, you may need surgery. This will open the carpal tunnel and release the pressure on the trapped nerve.  Follow-up care  Follow up with your healthcare provider, or as advised. If X-rays were taken, you will be notified of any new findings that may affect your care.     When to seek medical advice  Call your healthcare provider right away if any of these occur:    Pain not improving with the above treatment    Fingers or hand become cold, blue, numb, or tingly    Your whole arm becomes swollen or weak   Date Last Reviewed: 5/1/2018 2000-2018 [employer]. [address] [place] [address]. All rights reserved. This information is not intended as a substitute for professional medical care. Always follow your healthcare professional's instructions.               Taylor Dsouza MD  [facility]

## 2019-07-10 NOTE — PROGRESS NOTES
"Subjective     Juanita Gonzalez is a 23 year old female who presents to clinic today for the following health issues:    HPI     Chief Complaint   Patient presents with     [facility]     Patient has been treated for ADHD since childhood.  She has been on and off adderall through college.  She notes that it interferes with sleep and has preferred to not take.  She notes that her inattention does interfere with work and she notes that she is late all the time and forgetful.    Asthma Follow-Up    Was ACT completed today?    Yes    ACT Total Scores 10/14/2014   ACT TOTAL SCORE 25   ASTHMA ER VISITS 0 = None   ASTHMA HOSPITALIZATIONS 0 = None       How many days per week do you miss taking your asthma controller medication?  I do not have an asthma controller medication    Please describe any recent triggers for your asthma: pollens    Have you had any Emergency Room Visits, Urgent Care Visits, or Hospital Admissions since your last office visit?  No              Patient notes continued pain her left upper back.  She denies radicular symptoms.  Pain is constant.  She has tried chiropractic care, physical therapy.  She feels like things are not \"straight\".  Pain started after cracking her back.  She does not take anything for pain.      Patient notes pain to her right knee and left wrist.  Right knee pain is constant.  Patient notes some locking and catching.  She denies injury.  She denies swelling.  She feels like her \"bones rub together\".  Right knee has been hurting for several months.  Patient was diagnosed with carpal tunnel to her left wrist several months ago.  She notes some paresthesia to her left forearm at night, that resolves in the am.  She notes a sharp pain as well.  She has worn a wrist brace with some improvement.  She tried diclofenac with some improvement.      Patient Active Problem List   Diagnosis     Obesity     Attention deficit hyperactivity disorder (ADHD)     Disturbance in sleep " behavior     Acanthosis nigricans     Acne     Seasonal allergies     Eczema     Vitamin D deficiency     Insulin resistance     Anisometropia     Dysmetabolic syndrome X     Myopia     Enchondroma of wrist or hand     Osteoma     Café au lait spot     Neck pain     Bilateral thoracic back pain     Morbid obesity (H)     Past Surgical History:   Procedure Laterality Date     DENTAL SURGERY       GRAFT BONE TO FINGER  3/18/2013    Procedure: GRAFT BONE TO FINGER;  Right Second Metacarpal Curettage and Allograft;  Surgeon: Jeremiah Yu MD;  [facility]     HC TOOTH EXTRACTION W/FORCEP       LE FORT ONE N/A 6/11/2015    Procedure: LE FORT ONE;  Surgeon: Vitaliy Yun DDS;  [facility]     LE FORT ONE , OSTEOTOMY SAGITTAL SPLIT, COMBINED N/A 12/3/2014    Procedure: COMBINED LE FORT ONE, OSTEOTOMY SAGITTAL SPLIT;  Surgeon: Vitaliy Yun DDS;  Location[facility]     TONGUE SURGERY  9/17/2014    tongue lesion removed       Social History     Tobacco Use     Smoking status: Never Smoker     Smokeless tobacco: Never Used   Substance Use Topics     Alcohol use: Yes     Comment: Occasional with friends.     Family History   Problem Relation Age of Onset     Asthma Mother      Obesity Father      Hypertension Maternal Grandmother      Cancer Maternal Grandfather      Hypertension Paternal Grandmother      Glaucoma Other         Mat Great Aunt/blindness     Glaucoma Maternal Aunt      Macular Degeneration No family hx of          Current Outpatient Medications   Medication Sig Dispense Refill     albuterol (PROAIR HFA) 108 (90 Base) MCG/ACT inhaler Inhale 2 puffs into the lungs every 6 hours 1 Inhaler 0     amphetamine-dextroamphetamine (ADDERALL) 5 MG tablet Take 1 tablet (5 mg) by mouth 2 times daily 60 tablet 0     montelukast (SINGULAIR) 10 MG tablet Take 1 tablet (10 mg) by mouth At Bedtime 90 tablet 0     tiZANidine (ZANAFLEX) 2 MG tablet Take 1-2 tablets (2-4 mg) by mouth 3 times daily as needed  "for muscle spasms 30 tablet 1     Allergies   Allergen Reactions     Bactrim Hives     BP Readings from Last 3 Encounters:   07/11/19 122/70   04/15/19 124/77   08/08/18 118/70    Wt Readings from Last 3 Encounters:   07/11/19 120.9 kg (266 lb 9.6 oz)   04/15/19 117 kg (258 lb)   08/08/18 112.5 kg (248 lb)                    Reviewed and updated as needed this visit by Provider  Tobacco  Allergies  Meds  Problems  Med Hx  Surg Hx  Fam Hx         Review of Systems   ROS COMP: Constitutional, HEENT, cardiovascular, pulmonary, gi and gu systems are negative, except as otherwise noted.      Objective    /70   Pulse 106   Temp 98.8  F (37.1  C) (Oral)   Resp 18   Ht 1.645 m (5' 4.76\")   Wt 120.9 kg (266 lb 9.6 oz)   LMP 06/21/2019 (Approximate)   SpO2 99%   BMI 44.69 kg/m    Body mass index is 44.69 kg/m .  Physical Exam   GENERAL: healthy, alert and no distress  RESP: lungs clear to auscultation - no rales, rhonchi or wheezes  CV: regular rate and rhythm, normal S1 S2, no S3 or S4, no murmur, click or rub, no peripheral edema and peripheral pulses strong  MS: Paresthesias noted with palpation of left ulnar groove, negative phalen, negative Tinel.  Right knee: full range of motion present, no crepitus noted; tenderness to palpation of medial knee; negative varus/valgus stress tests; negative anterior/posterior drawer tests; positive Leroy's test  Comprehensive back pain exam:  Tenderness of left parathoracic muscles, Range of motion not limited by pain and Lower extremity strength functional and equal on both sides  PSYCH: mentation appears normal, affect normal/bright    Diagnostic Test Results:  pending        Assessment & Plan     1. Mild intermittent asthma without complication  Patient to resume singular.  Refill of rescue inhaler.  - albuterol (PROAIR HFA) 108 (90 Base) MCG/ACT inhaler; Inhale 2 puffs into the lungs every 6 hours  Dispense: 1 Inhaler; Refill: 0  - montelukast (SINGULAIR) 10 " MG tablet; Take 1 tablet (10 mg) by mouth At Bedtime  Dispense: 90 tablet; Refill: 0    2. Screening examination for venereal disease    - NEISSERIA GONORRHOEA PCR  - CHLAMYDIA TRACHOMATIS PCR    3. Morbid obesity (H)  We briefly touched on weight and how it may affect her muscle and joint pains.  We will discuss more in-depth at follow-up appointment.    4. Attention deficit hyperactivity disorder (ADHD), predominantly inattentive type  Patient to restart adderall and immediate release formula.  - amphetamine-dextroamphetamine (ADDERALL) 5 MG tablet; Take 1 tablet (5 mg) by mouth 2 times daily  Dispense: 60 tablet; Refill: 0  - Drug  Screen Comprehensive, Urine w/o Reported Meds (Pain Care Package)    5. Encounter for counseling regarding contraception  Patient interested in IUD.  - OB/GYN REFERRAL    6. Chronic left-sided thoracic back pain  Consider MRI if not improving.  - XR Thoracic Spine 3 Views; Future  - tiZANidine (ZANAFLEX) 2 MG tablet; Take 1-2 tablets (2-4 mg) by mouth 3 times daily as needed for muscle spasms  Dispense: 30 tablet; Refill: 1    7. Acute pain of right knee  Possible meniscal tear.  Consider MRI if not improving.  - [facility] REFERRAL; Future    8. Ulnar neuritis, left  Patient to wrap elbow at night to relief pressure to ulnar nerve.  - [facility] REFERRAL; Future             Return in about 4 weeks (around 8/8/2019) for Medication Recheck- adderall.    PINKY i[facility]nes [facility]

## 2019-07-11 ENCOUNTER — OFFICE VISIT (OUTPATIENT)
Dept: FAMILY MEDICINE | Facility: CLINIC | Age: 24
End: 2019-07-11
Payer: COMMERCIAL

## 2019-07-11 ENCOUNTER — ANCILLARY PROCEDURE (OUTPATIENT)
Dept: GENERAL RADIOLOGY | Facility: CLINIC | Age: 24
End: 2019-07-11
Attending: NURSE PRACTITIONER
Payer: COMMERCIAL

## 2019-07-11 VITALS
BODY MASS INDEX: 44.42 KG/M2 | RESPIRATION RATE: 18 BRPM | OXYGEN SATURATION: 99 % | WEIGHT: 266.6 LBS | SYSTOLIC BLOOD PRESSURE: 122 MMHG | HEIGHT: 65 IN | HEART RATE: 106 BPM | TEMPERATURE: 98.8 F | DIASTOLIC BLOOD PRESSURE: 70 MMHG

## 2019-07-11 DIAGNOSIS — E66.01 MORBID OBESITY (H): ICD-10-CM

## 2019-07-11 DIAGNOSIS — G56.22 ULNAR NEURITIS, LEFT: ICD-10-CM

## 2019-07-11 DIAGNOSIS — M54.6 CHRONIC LEFT-SIDED THORACIC BACK PAIN: ICD-10-CM

## 2019-07-11 DIAGNOSIS — Z30.09 ENCOUNTER FOR COUNSELING REGARDING CONTRACEPTION: ICD-10-CM

## 2019-07-11 DIAGNOSIS — Z11.3 SCREENING EXAMINATION FOR VENEREAL DISEASE: ICD-10-CM

## 2019-07-11 DIAGNOSIS — G89.29 CHRONIC LEFT-SIDED THORACIC BACK PAIN: ICD-10-CM

## 2019-07-11 DIAGNOSIS — F90.0 ATTENTION DEFICIT HYPERACTIVITY DISORDER (ADHD), PREDOMINANTLY INATTENTIVE TYPE: ICD-10-CM

## 2019-07-11 DIAGNOSIS — J45.20 MILD INTERMITTENT ASTHMA WITHOUT COMPLICATION: Primary | ICD-10-CM

## 2019-07-11 DIAGNOSIS — M25.561 ACUTE PAIN OF RIGHT KNEE: ICD-10-CM

## 2019-07-11 PROCEDURE — 80307 DRUG TEST PRSMV CHEM ANLYZR: CPT | Mod: 90 | Performed by: NURSE PRACTITIONER

## 2019-07-11 PROCEDURE — 87591 N.GONORRHOEAE DNA AMP PROB: CPT | Performed by: NURSE PRACTITIONER

## 2019-07-11 PROCEDURE — 72072 X-RAY EXAM THORAC SPINE 3VWS: CPT

## 2019-07-11 PROCEDURE — 99214 OFFICE O/P EST MOD 30 MIN: CPT | Performed by: NURSE PRACTITIONER

## 2019-07-11 PROCEDURE — 99000 SPECIMEN HANDLING OFFICE-LAB: CPT | Performed by: NURSE PRACTITIONER

## 2019-07-11 PROCEDURE — 87491 CHLMYD TRACH DNA AMP PROBE: CPT | Performed by: NURSE PRACTITIONER

## 2019-07-11 RX ORDER — ALBUTEROL SULFATE 90 UG/1
2 AEROSOL, METERED RESPIRATORY (INHALATION) EVERY 6 HOURS
Qty: 1 INHALER | Refills: 0 | Status: SHIPPED | OUTPATIENT
Start: 2019-07-11 | End: 2019-10-30

## 2019-07-11 RX ORDER — TIZANIDINE 2 MG/1
2-4 TABLET ORAL 3 TIMES DAILY PRN
Qty: 30 TABLET | Refills: 1 | Status: SHIPPED | OUTPATIENT
Start: 2019-07-11 | End: 2019-09-17

## 2019-07-11 RX ORDER — MONTELUKAST SODIUM 10 MG/1
10 TABLET ORAL AT BEDTIME
Qty: 90 TABLET | Refills: 0 | Status: SHIPPED | OUTPATIENT
Start: 2019-07-11 | End: 2019-08-08

## 2019-07-11 RX ORDER — DEXTROAMPHETAMINE SACCHARATE, AMPHETAMINE ASPARTATE, DEXTROAMPHETAMINE SULFATE AND AMPHETAMINE SULFATE 1.25; 1.25; 1.25; 1.25 MG/1; MG/1; MG/1; MG/1
5 TABLET ORAL 2 TIMES DAILY
Qty: 60 TABLET | Refills: 0 | Status: SHIPPED | OUTPATIENT
Start: 2019-07-11 | End: 2019-08-08

## 2019-07-11 ASSESSMENT — MIFFLIN-ST. JEOR: SCORE: 1971.42

## 2019-07-11 ASSESSMENT — PATIENT HEALTH QUESTIONNAIRE - PHQ9: SUM OF ALL RESPONSES TO PHQ QUESTIONS 1-9: 4

## 2019-07-11 ASSESSMENT — PAIN SCALES - GENERAL: PAINLEVEL: NO PAIN (0)

## 2019-07-12 ASSESSMENT — ASTHMA QUESTIONNAIRES: ACT_TOTALSCORE: 23

## 2019-07-12 NOTE — RESULT ENCOUNTER NOTE
Dear Juanita,    Your recent test results are attached.      No gonorrhea or chlamydia detected.      If you have any questions please feel free to contact [phone] or myself via [email or web address].    Sincerely,  Tricia Rodriguez, CNP

## 2019-07-12 NOTE — RESULT ENCOUNTER NOTE
Dear Juanita,    Your recent test results are attached.      Your x-ray shows some scoliosis.  Otherwise x-ray is okay.  At this point, we can have your proceed with an MRI or trial the muscle relaxer for a couple of weeks.  Please MyChart me with what you would like to do.    If you have any questions please feel free to contact [phone] or myself via [employer].    Sincerely,  Tricia Rodriguez, CNP

## 2019-07-14 ENCOUNTER — OFFICE VISIT (OUTPATIENT)
Dept: URGENT CARE | Facility: URGENT CARE | Age: 24
End: 2019-07-14
Payer: COMMERCIAL

## 2019-07-14 ENCOUNTER — NURSE TRIAGE (OUTPATIENT)
Dept: NURSING | Facility: CLINIC | Age: 24
End: 2019-07-14

## 2019-07-14 VITALS
SYSTOLIC BLOOD PRESSURE: 124 MMHG | OXYGEN SATURATION: 97 % | HEART RATE: 95 BPM | DIASTOLIC BLOOD PRESSURE: 79 MMHG | BODY MASS INDEX: 44.25 KG/M2 | TEMPERATURE: 98.1 F | WEIGHT: 264 LBS | RESPIRATION RATE: 18 BRPM

## 2019-07-14 DIAGNOSIS — R30.0 DYSURIA: ICD-10-CM

## 2019-07-14 DIAGNOSIS — B96.89 BV (BACTERIAL VAGINOSIS): Primary | ICD-10-CM

## 2019-07-14 DIAGNOSIS — N76.0 BV (BACTERIAL VAGINOSIS): Primary | ICD-10-CM

## 2019-07-14 DIAGNOSIS — N89.8 VAGINAL DISCHARGE: ICD-10-CM

## 2019-07-14 LAB
ALBUMIN UR-MCNC: NEGATIVE MG/DL
APPEARANCE UR: CLEAR
BILIRUB UR QL STRIP: NEGATIVE
COLOR UR AUTO: YELLOW
GLUCOSE UR STRIP-MCNC: NEGATIVE MG/DL
HGB UR QL STRIP: NEGATIVE
KETONES UR STRIP-MCNC: NEGATIVE MG/DL
LEUKOCYTE ESTERASE UR QL STRIP: NEGATIVE
NITRATE UR QL: NEGATIVE
PH UR STRIP: 7 PH (ref 5–7)
SOURCE: NORMAL
SP GR UR STRIP: <=1.005 (ref 1–1.03)
SPECIMEN SOURCE: ABNORMAL
UROBILINOGEN UR STRIP-ACNC: 0.2 EU/DL (ref 0.2–1)
WET PREP SPEC: ABNORMAL

## 2019-07-14 PROCEDURE — 87210 SMEAR WET MOUNT SALINE/INK: CPT | Performed by: NURSE PRACTITIONER

## 2019-07-14 PROCEDURE — 99213 OFFICE O/P EST LOW 20 MIN: CPT | Performed by: NURSE PRACTITIONER

## 2019-07-14 PROCEDURE — 81003 URINALYSIS AUTO W/O SCOPE: CPT | Performed by: NURSE PRACTITIONER

## 2019-07-14 RX ORDER — METRONIDAZOLE 500 MG/1
500 TABLET ORAL 2 TIMES DAILY
Qty: 14 TABLET | Refills: 0 | Status: SHIPPED | OUTPATIENT
Start: 2019-07-14 | End: 2019-07-14

## 2019-07-14 RX ORDER — METRONIDAZOLE 500 MG/1
500 TABLET ORAL 2 TIMES DAILY
Qty: 14 TABLET | Refills: 0 | Status: SHIPPED | OUTPATIENT
Start: 2019-07-14 | End: 2019-08-08

## 2019-07-14 ASSESSMENT — ENCOUNTER SYMPTOMS
DYSURIA: 0
CHILLS: 0
VOMITING: 0
FREQUENCY: 1
ABDOMINAL DISTENTION: 0
DIARRHEA: 0
ACTIVITY CHANGE: 0
APPETITE CHANGE: 0
FEVER: 0
FATIGUE: 0
ABDOMINAL PAIN: 0

## 2019-07-14 NOTE — LETTER
Return to  Work Release    Date: 7/14/2019      Name: Juanita Gonzalez                       YOB: 1995    The patient was seen at: [facility], may return to work on 07/14/2019.             _________________________  PINKY Zamora CNP

## 2019-07-14 NOTE — PROGRESS NOTES
SUBJECTIVE:   Juanita Gonzalez is a 23 year old female presenting with a chief complaint of   Chief Complaint   Patient presents with     UTI       She is an established patient of [facility].    UTI    Onset of symptoms was 2 days.  Course of illness is worsening  Current and associated symptoms frequency, urgency and voiding in small amounts and vaginal discharge with urination  Treatment and measures tried none  Predisposing factors include history of frequent UTI's  Patient denies rigors, flank pain, temperature > 101 degrees F., vomiting, vaginal odor and dyspareunia  LMP: 06/21/2019    Review of Systems   Constitutional: Negative for activity change, appetite change, chills, fatigue and fever.   Gastrointestinal: Negative for abdominal distention, abdominal pain, diarrhea and vomiting.   Genitourinary: Positive for frequency, urgency and vaginal discharge. Negative for dysuria and vaginal bleeding.   Skin: Negative.    All other systems reviewed and are negative.      Past Medical History:   Diagnosis Date     Allergic conjunctivitis      Anisometropia      Asthma      Attention deficit disorder with hyperactivity(314.01)     stopped medication 1 year ago     Myopia      Nonorganic enuresis     voiding dysfunction      Obesity, unspecified      OCD (obsessive compulsive disorder)      Wears contact lenses      Family History   Problem Relation Age of Onset     Asthma Mother      Obesity Father      Hypertension Maternal Grandmother      Cancer Maternal Grandfather      Hypertension Paternal Grandmother      Glaucoma Other         Mat Great Aunt/blindness     Glaucoma Maternal Aunt      Macular Degeneration No family hx of      Current Outpatient Medications   Medication Sig Dispense Refill     albuterol (PROAIR HFA) 108 (90 Base) MCG/ACT inhaler Inhale 2 puffs into the lungs every 6 hours 1 Inhaler 0     amphetamine-dextroamphetamine (ADDERALL) 5 MG tablet Take 1 tablet (5 mg) by mouth 2 times daily 60  tablet 0     metroNIDAZOLE (FLAGYL) 500 MG tablet Take 1 tablet (500 mg) by mouth 2 times daily for 7 days 14 tablet 0     montelukast (SINGULAIR) 10 MG tablet Take 1 tablet (10 mg) by mouth At Bedtime 90 tablet 0     tiZANidine (ZANAFLEX) 2 MG tablet Take 1-2 tablets (2-4 mg) by mouth 3 times daily as needed for muscle spasms 30 tablet 1     Social History     Tobacco Use     Smoking status: Never Smoker     Smokeless tobacco: Never Used   Substance Use Topics     Alcohol use: Yes     Comment: Occasional with friends.       OBJECTIVE  /79 (BP Location: Left arm, Patient Position: Chair, Cuff Size: Adult Large)   Pulse 95   Temp 98.1  F (36.7  C) (Oral)   Resp 18   Wt 119.7 kg (264 lb)   LMP 06/21/2019 (Approximate)   SpO2 97%   BMI 44.25 kg/m      Physical Exam   Constitutional: She is oriented to person, place, and time. No distress.   Cardiovascular: Normal rate, regular rhythm, normal heart sounds and intact distal pulses. Exam reveals no gallop and no friction rub.   No murmur heard.  Pulmonary/Chest: Effort normal and breath sounds normal. No respiratory distress. She has no wheezes.   Abdominal: Soft. She exhibits no distension and no mass. There is tenderness. There is no rebound and no guarding.   Suprapubic pressure     Neurological: She is alert and oriented to person, place, and time.   Skin: Skin is warm. Capillary refill takes less than 2 seconds. No rash noted.   Psychiatric: She has a normal mood and affect.       Labs:  Results for orders placed or performed in visit on 07/14/19 (from the past 24 hour(s))   Wet prep   Result Value Ref Range    Specimen Description Vagina     Wet Prep No Trichomonas seen     Wet Prep Clue cells seen  Moderate   (A)     Wet Prep No yeast seen     Wet Prep WBC'S seen  Few      *UA reflex to Microscopic and Culture ([facility] (except [place] and [place])   Result Value Ref Range    Color Urine Yellow     Appearance Urine Clear      Glucose Urine Negative NEG^Negative mg/dL    Bilirubin Urine Negative NEG^Negative    Ketones Urine Negative NEG^Negative mg/dL    Specific Gravity Urine <=1.005 1.003 - 1.035    Blood Urine Negative NEG^Negative    pH Urine 7.0 5.0 - 7.0 pH    Protein Albumin Urine Negative NEG^Negative mg/dL    Urobilinogen Urine 0.2 0.2 - 1.0 EU/dL    Nitrite Urine Negative NEG^Negative    Leukocyte Esterase Urine Negative NEG^Negative    Source Midstream Urine          ASSESSMENT:    ICD-10-CM    1. BV (bacterial vaginosis) N76.0 metroNIDAZOLE (FLAGYL) 500 MG tablet    B96.89    2. Dysuria R30.0 *UA reflex to Microscopic and Culture ([facility] (except [place] and [place])   3. Vaginal discharge N89.8 Wet prep        Medical Decision Making:    Differential Diagnosis:  UTI: UTI, Dysuria, Vaginitis and BV    Serious Comorbid Conditions:  Adult:  None    PLAN: Discussed with patient the vaginal swab findings and advised of need for treatment and completion of course with bacterial vaginosis. Discussed causes and conservative measures to avoid reoccurrence. Advised no coitus or alcohol use with medication regimen. Patient advised to return if symptoms worsen or persist. Education and letter for work provided. Advised to return to follow up with PCP if symptoms persist. Patient agreed to the plan of care.     PINKY Zamora, CNP      Patient Instructions     Patient Education     Bacterial Vaginosis    You have a vaginal infection called bacterial vaginosis (BV). Both good and bad bacteria are present in a healthy vagina. BV occurs when these bacteria get out of balance. The number of bad bacteria increase. And the number of good bacteria decrease. Although BV is associated with sexual activity, it is not a sexually transmitted disease.  BV may or may not cause symptoms. If symptoms do occur, they can include:    Thin, gray, milky-white, or sometimes green discharge    Unpleasant odor or  fishy   smell    Itching, burning, or pain in or around the vagina  It is not known what causes BV, but certain factors can make the problem more likely. This can include:    Douching    Having sex with a new partner    Having sex with more than one partner  BV will sometimes go away on its own. But treatment is usually recommended. This is because untreated BV can increase the risk of more serious health problems such as:    Pelvic inflammatory disease (PID)     delivery (giving birth to a baby early if you re pregnant)    HIV and certain other sexually transmitted diseases (STDs)    Infection after surgery on the reproductive organs  Home care  General care    BV is most often treated with medicines called antibiotics. These may be given as pills or as a vaginal cream. If antibiotics are prescribed, be sure to use them exactly as directed. Also, be sure to complete all of the medicine, even if your symptoms go away.    Don't douche or having sex during treatment.    If you have sex with a female partner, ask your healthcare provider if she should also be treated.  Prevention    Don't douche.    Don't have sex. If you do have sex, then take steps to lower your risk:  ? Use condoms when having sex.  ? Limit the number of sexual partners you have.  Follow-up care  Follow up with your healthcare provider, or as advised.  When to seek medical advice  Call your healthcare provider right away if:    You have a fever of 100.4 F (38 C) or higher, or as directed by your provider.    Your symptoms worsen, or they don t go away within a few days of starting treatment.    You have new pain in the lower belly or pelvic region.    You have side effects that bother you or a reaction to the pills or cream you re prescribed.    You or any partners you have sex with have new symptoms, such as a rash, joint pain, or sores.  Date Last Reviewed: 10/1/2017    [id number] [employer]. [address] [place] [address], [place] [address].  All rights reserved. This information is not intended as a substitute for professional medical care. Always follow your healthcare professional's instructions.

## 2019-07-14 NOTE — PATIENT INSTRUCTIONS

## 2019-07-14 NOTE — TELEPHONE ENCOUNTER
She is going to go to [facility] for evaluation.  Latisha Zaman RN-[facility]      Reason for Disposition    All other patients with painful urination(Exception: [1] EITHER frequency or urgency AND [2] has on-call doctor)    Additional Information    Negative: Shock suspected (e.g., cold/pale/clammy skin, too weak to stand, low BP, rapid pulse)    Negative: Sounds like a life-threatening emergency to the triager    Negative: Followed a genital area injury    Negative: Taking antibiotic for urinary tract infection (UTI)    Negative: Pregnant    Negative: Postpartum < 1 month    Negative: [1] Unable to urinate (or only a few drops) > 4 hours AND [2] bladder feels very full (e.g., palpable bladder or strong urge to urinate)    Negative: Vomiting    Negative: Patient sounds very sick or weak to the triager    Negative: [1] SEVERE pain with urination  (e.g., excruciating) AND [2] not improved after 2 hours of pain medicine and Sitz bath     Pain at 7    Negative: Fever > 100.5 F (38.1 C)    Negative: Side (flank) or lower back pain present    Negative: Diabetes mellitus or weak immune system (e.g., HIV positive, cancer chemotherapy, transplant patient)    Negative: Bedridden (e.g., nursing home patient, CVA, chronic illness, recovering from surgery)    Negative: Artificial heart valve or artificial joint    Negative: Unusual vaginal discharge (e.g., bad smelling, yellow, green, or foamy-white)    Negative: Patient is worried about sexually transmitted disease (STD)    Negative: Possibility of pregnancy    Negative: Blood in urine (red, pink, or tea-colored)    Negative: Age > 50 years    Negative: > 2 UTI's in last year    Protocols used: URINATION PAIN - FEMALE-A-

## 2019-07-15 LAB — COMPREHEN DRUG ANALYSIS UR: NORMAL

## 2019-07-16 NOTE — RESULT ENCOUNTER NOTE
Dear Juanita,    Your recent test results are attached.      THC noted on drug screen.  We can discuss this further at follow-up.    If you have any questions please feel free to contact [phone] or myself via [employer].    Sincerely,  Tricia Rodriguez, CNP

## 2019-07-21 ENCOUNTER — OFFICE VISIT (OUTPATIENT)
Dept: URGENT CARE | Facility: URGENT CARE | Age: 24
End: 2019-07-21
Payer: COMMERCIAL

## 2019-07-21 VITALS
WEIGHT: 264 LBS | HEART RATE: 90 BPM | OXYGEN SATURATION: 98 % | BODY MASS INDEX: 43.99 KG/M2 | DIASTOLIC BLOOD PRESSURE: 80 MMHG | SYSTOLIC BLOOD PRESSURE: 120 MMHG | RESPIRATION RATE: 14 BRPM | TEMPERATURE: 97.9 F | HEIGHT: 65 IN

## 2019-07-21 DIAGNOSIS — R22.9 LOCALIZED SUPERFICIAL SWELLING OF SKIN: Primary | ICD-10-CM

## 2019-07-21 PROCEDURE — 99213 OFFICE O/P EST LOW 20 MIN: CPT | Performed by: INTERNAL MEDICINE

## 2019-07-21 ASSESSMENT — ENCOUNTER SYMPTOMS
VOMITING: 0
COUGH: 0
PALPITATIONS: 0
TROUBLE SWALLOWING: 0
RHINORRHEA: 0
ABDOMINAL PAIN: 0
WHEEZING: 0
NAUSEA: 0
HEADACHES: 0
SORE THROAT: 1
ALLERGIC/IMMUNOLOGIC COMMENTS: POLLEN
ADENOPATHY: 0
SHORTNESS OF BREATH: 0
FEVER: 0

## 2019-07-21 ASSESSMENT — MIFFLIN-ST. JEOR: SCORE: 1959.41

## 2019-07-21 NOTE — PROGRESS NOTES
SUBJECTIVE:   Juanita Gonzalez is a 23 year old female presenting with a chief complaint of   Chief Complaint   Patient presents with     Urgent Care     Derm Problem     concern of allergic reaction to tizanidine or metronidazole. Started taking them recently.        She is an established patient of [facility].    Concerned about skin swellings for past few days      tizanidine for muscle relaxant  Finished flagyl for bacterial vaginosis - timing related to this medication    swelling in random places  Face,  Then lip,  Then back  Then hand.  Feels like lump under skin  No redness    Currently she feels there is swelling on the back of her right hand and left upper back    The one on her lip is gone    Review of Systems   Constitutional: Negative for fever.   HENT: Positive for sore throat. Negative for rhinorrhea and trouble swallowing.         Last night.  resolved    Respiratory: Negative for cough, shortness of breath and wheezing.    Cardiovascular: Negative for palpitations.   Gastrointestinal: Negative for abdominal pain, nausea and vomiting.   Allergic/Immunologic: Positive for environmental allergies.        Pollen   Neurological: Negative for headaches.   Hematological: Negative for adenopathy.       Past Medical History:   Diagnosis Date     Allergic conjunctivitis      Anisometropia      Asthma      Attention deficit disorder with hyperactivity(314.01)     stopped medication 1 year ago     Myopia      Nonorganic enuresis     voiding dysfunction      Obesity, unspecified      OCD (obsessive compulsive disorder)      Wears contact lenses      Family History   Problem Relation Age of Onset     Asthma Mother      Obesity Father      Hypertension Maternal Grandmother      Cancer Maternal Grandfather      Hypertension Paternal Grandmother      Glaucoma Other         Mat Great Aunt/blindness     Glaucoma Maternal Aunt      Macular Degeneration No family hx of      Current Outpatient Medications  "  Medication Sig Dispense Refill     montelukast (SINGULAIR) 10 MG tablet Take 1 tablet (10 mg) by mouth At Bedtime 90 tablet 0     tiZANidine (ZANAFLEX) 2 MG tablet Take 1-2 tablets (2-4 mg) by mouth 3 times daily as needed for muscle spasms 30 tablet 1     albuterol (PROAIR HFA) 108 (90 Base) MCG/ACT inhaler Inhale 2 puffs into the lungs every 6 hours 1 Inhaler 0     amphetamine-dextroamphetamine (ADDERALL) 5 MG tablet Take 1 tablet (5 mg) by mouth 2 times daily (Patient not taking: Reported on 7/21/2019) 60 tablet 0     metroNIDAZOLE (FLAGYL) 500 MG tablet Take 1 tablet (500 mg) by mouth 2 times daily (Patient not taking: Reported on 7/21/2019) 14 tablet 0     Social History     Tobacco Use     Smoking status: Never Smoker     Smokeless tobacco: Never Used   Substance Use Topics     Alcohol use: Yes     Comment: Occasional with friends.       OBJECTIVE  /80   Pulse 90   Temp 97.9  F (36.6  C) (Oral)   Resp 14   Ht 1.645 m (5' 4.75\")   Wt 119.7 kg (264 lb)   LMP 07/21/2019   SpO2 98%   Breastfeeding? No   BMI 44.27 kg/m      Physical Exam   Constitutional: She appears well-developed and well-nourished.   HENT:   Mouth/Throat: Oropharynx is clear and moist.   No lip swelling noted.  Patient did show me a cell phone picture where she had prominent upper swelling in the middle of the lip.  Skin colored.    No sores noted in her mouth   Cardiovascular: Normal rate, regular rhythm, normal heart sounds and intact distal pulses.   Pulmonary/Chest: Effort normal and breath sounds normal.   Skin:   no obvious rash noted  Patient subjectively had swelling sensation on the back of her right hand and her left upper back    In examination of these area as there is no distinct borders for swelling redness or warmth noted although there were some subtle changes in the areas of her concern.     Vitals reviewed.      Labs:  No results found for this or any previous visit (from the past 24 " hour(s)).        ASSESSMENT:      ICD-10-CM    1. Localized superficial swelling of skin R22.9         Medical Decision Making:  Discussed with patient and I understand her subjective symptoms and potentially can see what her concerns are on exam although it is not obvious.    Potentially is an early allergic reaction that just has not given a form fully and hives.  Potentially mastocytosis/histocytosis?    Recommend her reviewing handouts below for potential triggers for allergies.    At this time recommend treatment with over-the-counter antihistamines and to make sure she is taking her Singulair.    If her symptoms are not resolving she may need a course of oral prednisone with recheck with her primary provider      Patient Instructions     Skin colored raised areas, difficult to see on exam today.    Cool compress  Benadryl 50 mg at night  Zyrtec or claritin day time  singulair dialy    If not improved, may need steroid    Flagyl is done,  Hold tizanidine.    Observe of triggers.  Review handouts    Recheck mid- next week.      Patient Education     Understanding Hives (Urticaria)    Urticaria (hives) are red, itchy, and swollen areas on the skin. They are most often an allergic reaction from eating a food or taking a medicine. Sometimes the cause may be unknown. A single hive can vary in size from a half inch to several inches. Hives can appear all over the body. Or they may appear on only one part of the body.  What causes hives?  Hives can be caused by food and beverages such as:    Tree nuts (almonds, walnuts, hazelnuts)    Peanuts    Eggs    Shellfish    Milk  Hives can also be caused by medicines such as:    Antibiotics, especially penicillin and sulfa-based medicines     Anticonvulsant or antiseizure medicines     Chemotherapy medicines   Other causes of hives include:    Infection or virus    Heat    Cold air or cold water    Exercise    Scratching or rubbing your skin, or wearing tight-fitting clothes  that rub your skin    Being exposed to sunlight or light from a light bulb, in rare cases    Inhaled-chemicals in the environment from foods and drugs, insects, plants, or other sources  In some cases, hives may occur again and again with no specific cause.  If you have hives    Stay away from the food, drink, medicine, or other thing that may be causing the hives.    Ask your healthcare provider how to control itchy or irritated skin.    Talk with your healthcare provider right away if you think a medicine gave you hives.  Watch for anaphylaxis  If you have hives, watch for symptoms of a severe reaction that can affect your entire body. This is called anaphylaxis. Symptoms can include swollen areas of the body, wheezing, trouble breathing or swallowing, and a hoarse voice. This reaction may happen right away. Or it may happen in an hour or more. In extreme cases, the airways from mouth to lungs may swell and make breathing difficult. This is a medical emergency. Use epinephrine medicine if you have it, and call 911 or go to the emergency room.     When to call your healthcare provider  Call your healthcare provider if:    Your hives feel uncomfortable    You have never had hives before    Your symptoms don't go away or come back    Your symptoms get worse or new symptoms develop such as:   ? Sneezing, coughing, runny or stuffy nose  ? Itching of the eyes, nose, or roof of the mouth  ? Itching, burning, stinging, or pain  ? Dry, flaky, cracking, or scaly skin  ? Red or purple spots  Call 911  Call 911 right away if you have:    Swelling in your lips, tongue, or throat, called angioedema    Drooling    Trouble breathing, talking, or swallowing    Cool, moist or pale (blue in color) skin    Fast and weak heartbeat    Wheezing or short of breath    Feeling lightheaded or confused    Diarrhea    Severe nausea or vomiting    Seizure    Feeling dizzy or weak, or a sudden drop in blood pressure   Date Last Reviewed:  4/1/2017 2000-2018 [employer]. [address] [place] [address], [place] [address]. All rights reserved. This information is not intended as a substitute for professional medical care. Always follow your healthcare professional's instructions.           Patient Education     Hives (Adult)  Hives are pink or red bumps on the skin. These bumps are also known as wheals. The bumps can itch, burn, or sting. Hives can occur anywhere on the body. They vary in size and shape and can form in clusters. Individual hives can appear and go away quickly. New hives may develop as old ones fade. Hives are common and usually harmless. Occasionally hives are a sign of a serious allergy.  Hives are often caused by an allergic reaction. It may be an allergic reaction to foods such as fruit, shellfish, chocolate, nuts, or tomatoes. It may be a reaction to pollens, animal fur, or mold spores. Medicines, chemicals, and insect bites can also cause hives. And hives can be caused by hot sun or cold air. The cause of hives can be difficult to find.  You may be given medicines to relieve swelling and itching. Follow all instructions when using these medicines. The hives will usually fade in a few days, but can last up to 2 weeks.  Home care  Follow these tips:    Try to find the cause of the hives and eliminate it. Discuss possible causes with your healthcare provider. Future reactions to the same allergen may be worse.    Don t scratch the hives. Scratching will delay healing. To reduce itching, apply cool, wet compresses to the skin.    Dress in soft, loose cotton clothing.    Don t bathe in hot water. This can make the itching worse.    Apply an ice pack or cool pack wrapped in a thin towel to your skin. This will help reduce redness and itching. But if your hives were caused by exposure to cold, then do not apply more cold to them.    You may use over-the counter antihistamines to reduce itching. Some older antihistamines, such as  diphenhydramine and chlorpheniramine, are inexpensive. But they need to be taken often and may make you sleepy. They are best used at bedtime. Don t use diphenhydramine if you have glaucoma or have trouble urinating because of an enlarged prostate. Newer antihistamines, such as loratadine, cetirizine, and fexofenadine, are generally more expensive. But they tend to have fewer side effects, such as drowsiness. They can be taken less often.    Another type of antihistamine is used to treat heartburn. This type includes ranitidine, nizatidine, famotidine, and cimetidine. These are sometimes used along with the above antihistamines if a single medicine is not working.  Follow-up care  Follow up with your healthcare provider if your symptoms don't get better in 2 days. Ask your provider about allergy testing if you have had a severe reaction, or have had several episodes of hives. He or she can use the allergy testing to find out what you are allergic to.  When to seek medical advice  Call your healthcare provider right away if any of these occur:    Fever of 100.4 F (38.0 C) or higher, or as directed by your healthcare provider    Redness, swelling, or pain    Foul-smelling fluid coming from the rash  Call 911  Call 911 if any of the following occur:    Swelling of the face, throat, or tongue    Trouble breathing or swallowing    Dizziness, weakness, or fainting  Date Last Reviewed: 9/1/2016 2000-2018 [employer]. [address] [place] [address]. All rights reserved. This information is not intended as a substitute for professional medical care. Always follow your healthcare professional's instructions.           Patient Education     General Allergic Reactions  An allergic reaction is a set of symptoms caused by an allergen. An allergen is something that causes a person s immune system to react. When a person comes in contact with an allergen, it causes the body to release chemicals. These  include the chemical histamine. Histamine causes swelling and itching. It may affect the entire body. This is called a general allergic reaction. Often symptoms affect only 1 part of the body. This is called a local allergic reaction.  You are having an allergic reaction. Almost anything can cause one. Different people are allergic to different things. It is usually something that you ate or swallowed, came into contact with by getting or putting it on your skin or clothes, or something you breathed in the air. This can be very annoying and sometimes scary.  Most of us think of allergic reactions when we have a rash or itchy skin. Symptoms can include:    Itching of the eyes, nose, and roof of the mouth    Runny or stuffy nose    Watery eyes     Sneezing or coughing     A blocked feeling in the ear    Red, itchy rash called hives    Red and purple spots    Rash, redness, welts, blisters    Itching, burning, stinging, pain    Dry, flaky, cracking, scaly skin  Severe symptoms include:    Swelling of the face, lips, or other parts of the body    Hoarse voice    Trouble swallowing, feeling like your throat is closing    Trouble breathing, wheezing    Nausea, vomiting, diarrhea, stomach cramps    Feeling faint or lightheaded, rapid heart rate  Sometimes the cause may be obvious. But there are so many things that can cause a reaction that you may not be able to figure out. The most important things to help find your allergen are:    Remembering when it started    What you were doing at the time or just before that    Any activities you were involved in    Any new products or contacts  Below are some common causes. But remember that almost anything can cause a reaction. You may not even be aware that you came into contact with one of these things:    Dust, mold, pollen    Plants (common ones are poison ivy and poison oak, but there are many others)     Animals    Foods such as shrimp, shellfish, peanuts, milk products,  gluten, and eggs. Also food colorings, flavorings, and additives.    Insect bites or stings such as bees, mosquitos, fleas, ticks    Medicines such as penicillin, sulfa medicines, amoxicillin, aspirin, and ibuprofen. But any medicine can cause a reaction.    Jewelry such as nickel or gold. This can be new, or something you ve worn for a while, including zippers and buttons.    Latex such as in gloves, clothes, toys, balloons, or some tapes. Some people allergic to latex may also have problems with foods like bananas, avocados, kiwi, papaya, or chestnuts.    Lotions, perfumes, cosmetics, soaps, shampoos, skincare products, nail products    Chemicals or dyes in clothing, linen, , hair dyes, soaps, iodine  Many viruses and common colds can cause a rash that is not an allergic reaction. Sometimes it is hard to tell the difference between allergies, sensitivity, or an intolerance to something. This is especially true with food. Many things can cause diarrhea, vomiting, stomach cramps, and skin irritation.  Home care    The goal of treatment is to help relieve the symptoms and get you feeling better. The rash will usually fade over several days. But it can sometimes last a couple of weeks. Over the next couple of days, there may be times when it is gets a little worse, and then better again. Here are some things to do:    If you know what you are allergic to, stay away from it. Future reactions could be worse than this one.    Avoid tight clothing and anything that heats up your skin (hot showers or baths, direct sunlight). Heat will make itching worse.    An ice pack will relieve local areas of intense itching and redness. To make an ice pack, put ice cubes in a plastic bag that seals at the top. Wrap it in a thin, clean towel. Don t put the ice directly on the skin because it can damage the skin.    Oral diphenhydramine is an over-the-counter antihistamine sold at pharmacy and grocery stores. Unless a  prescription antihistamine was given, diphenhydramine may be used to reduce itching if large areas of the skin are involved. It may make you sleepy. So be careful using it in the daytime or when going to school, working, or driving. Note: Don t use diphenhydramine if you have glaucoma or if you are a man with trouble urinating due to an enlarged prostate. There are other antihistamines that won t make you so sleepy. These are good choices for daytime use. Ask your pharmacist for suggestions.    Don t use diphenhydramine cream on your skin. It can cause a further reaction in some people.    To help prevent an infection, don't scratch the affected area. Scratching may worsen the reaction and damage your skin. It can also lead to an infection. Always check the affected for signs of an infection.    Call your healthcare provider and ask what you can use to help decrease the itching.    To decrease allergic reactions, try the following:      Use heat-steam to clean your home    Use high-efficiency particulate (HEPA) vacuums and filters    Stay away from food and pet triggers    Kill any cockroaches    Clean your house often  Follow-up care  Follow up with your healthcare provider, or as advised. If you had a severe reaction today, or if you have had several mild to medium allergic reactions in the past, ask your provider about allergy testing. This can help you find out what you are allergic to. If your reaction included dizziness, fainting, or trouble breathing or swallowing, ask your provider about carrying auto-injectable epinephrine.  Call 911  Call 911 if any of these occur:    Trouble breathing or swallowing, wheezing    Cool, moist, pale skin    Shortness of breath    Hoarse voice or trouble speaking    Confused     Very drowsy or trouble awakening    Fainting or loss of consciousness    Rapid heart rate    Feeling of dizziness or weakness or a sudden drop in blood pressure    Feeling of doom    Feeling  lightheaded    Severe nausea or vomiting, or diarrhea    Seizure    Swelling in the face, eyelids, lips, mouth, throat or tongue    Drooling  When to seek medical advice  Call your healthcare provider right away if any of these occur:    Spreading areas of itching, redness or swelling    Nausea or stomach cramps or abdominal pain    Continuing or recurring symptoms    Spreading areas of redness, swelling, or itching    Signs of infection at the affected site:  ? Spreading redness  ? Increased pain or swelling  ? Fluid or colored drainage from the site  ? Fever of 100.4 F (38 C) or above lasting for 24 to 48 hours, or as directed by your provider  Date Last Reviewed: 3/1/2017    7259-1321 [employer]. [address] [place] [address], [place] [address]. All rights reserved. This information is not intended as a substitute for professional medical care. Always follow your healthcare professional's instructions.           Patient Education     Angioedema  Angioedema (AJ-[id number]-CHIOMA-muh) is a sudden appearance of swollen patches (edema) on the skin or mucous membranes. It most often involves the face, lips, mouth, tongue, back of throat, or vocal cords. It may also occur in other places, such as the arms or legs. A rash may also appear during the first 4 days of this illness.  There are different types of angioedema. Your symptoms will depend on what type of angioedema you have. Swelling and redness may be the main symptoms. Like allergic reactions, angioedema may include:    Rash, hives, redness, welts, blisters    Itching, burning, stinging, pain    Dry, flaky, cracking, or scaly skin    Swelling of the face, lips, tongue, or other parts of the body  More severe symptoms may include:    Trouble swallowing, or feeling like your throat is closing    Trouble breathing or wheezing    Hoarse voice or trouble speaking    Nausea, vomiting, diarrhea, or stomach cramps    Feeling faint or lightheaded, rapid heart rate,  or low blood pressure  Angioedema can be triggered by exposure to certain substances. Medical conditions involving the immune systems and certain infections may cause it. In rare cases, angioedema can be hereditary. Sometimes the cause may be very clear. However, it is often hard to find a cause. The most common causes include:    Foods, such as shrimp, shellfish, peanuts, milk products, gluten, and eggs; also colorings, flavorings, and additives    Insect bites or stings, from bees, mosquitos, fleas, or ticks    Medicines, such as ACE inhibitors, penicillin, sulfa medicines, amoxicillin, aspirin, and ibuprofen    Latex, which may be in gloves, clothes, toys, balloons, and some kinds of tape. People who are allergic to latex may have problems with foods such as bananas, avocados, kiwi, papaya, or chestnuts.    Stress    Heat, cold, or sunlight  The most common cause of angioedema is a reaction to a class of medicines called ACE inhibitors. These are used to treat high blood pressure. ACE inhibitors include captopril, enalapril, and lisinopril. Angiodema can happen even after you have been taking the medicine for some time. Tell your doctor if you have angioedema symptoms and are taking any of these medicines. Angioedema may recur. It is important to watch for the earliest signs of this condition (see the list below). Contact your healthcare provider right away if swelling involves the face, mouth, or throat.  Home care  Rest quietly today. Don't do vigorous physical activity.  Medicines: The healthcare provider may prescribe medicines for itching, swelling, or pain. Follow the healthcare provider s instructions when taking these medicines.    Oral diphenhydramine is an antihistamine available without a prescription. Unless a prescription antihistamine was given, diphenhydramine may be used to reduce widespread itching. It may make you sleepy, so be careful using it when going to school, working, or driving. (Note:  Do not use diphenhydramine if you have glaucoma or if you are a man who has trouble urinating due to an enlarged prostate.) Loratadine is an antihistamine that may cause less drowsiness.    Don't use diphenhydramine cream on your skin. Some people can have an allergic reaction to this.    Calamine lotion or oatmeal baths sometimes help with itching.    You may use acetaminophen or ibuprofen for pain, unless another pain medicine was prescribed.    If you were told that your angioedema was caused by a medicine you are taking, you must stop taking it. Ask your healthcare provider for a different one. In the future, advise medical staff that you are allergic to this medicine.    If medicine was prescribed, such as steroids or antihistamines, be sure you understand what the medicine is and how to take it.   General care    Make sure you do not scratch areas of the body that had a reaction. This will help prevent infection.     Stay away from air pollution, tobacco, and wood smoke. Also stay away from cold temperatures. These things can make allergy symptoms worse.    Try to find out what cause your reaction. Make sure to remove the allergen. Future reactions may be worse.     If you have a serious allergy, wear a medical alert bracelet that notes this allergy.    If the healthcare provider prescribed an epinephrine auto injector kit, keep it with you at all times.     Tell all care providers about your allergy. Ask them h ow to use any prescribed medicines.    Keep a record of allergies and symptoms, and when they occurred. This will help your provider treat you over time.   Follow-up care  Follow up with your healthcare provider, or as advised. You may need to see an allergist. An allergist can help find the cause of an allergic reaction and give recommendations on how to prevent future reactions.  Call 911  Call 911 right away if any of these occur:    Trouble breathing or swallowing, or wheezing    Hoarse voice or  trouble speaking, or drooling    Chest pain or tightness    Confusion, lightheadedness, or dizziness    Extreme drowsiness or trouble awakening    Fainting or loss of consciousness    Rapid heart rate    Vomiting blood, or large amounts of blood in stool    Seizure    Nausea, vomiting, diarrhea, abdominal pain, or stomach cramps  When to seek medical attention  Call your healthcare provider right away if any of the following occur:    Symptoms don't go away    Symptoms come back    Symptoms get worse or new symptoms develop    Hives feel uncomfortable    Fever of 100.4 F (38 C), or as directed by your healthcare provider  Date Last Reviewed: 5/1/2017 2000-2018 [employer] [facility]. [address] [place] [address], [place] [address]. All rights reserved. This information is not intended as a substitute for professional medical care. Always follow your healthcare professional's instructions.

## 2019-07-21 NOTE — PATIENT INSTRUCTIONS
Skin colored raised areas, difficult to see on exam today.    Cool compress  Benadryl 50 mg at night  Zyrtec or claritin day time  singulair dialy    If not improved, may need steroid    Flagyl is done,  Hold tizanidine.    Observe of triggers.  Review handouts    Recheck mid- next week.      Patient Education     Understanding Hives (Urticaria)    Urticaria (hives) are red, itchy, and swollen areas on the skin. They are most often an allergic reaction from eating a food or taking a medicine. Sometimes the cause may be unknown. A single hive can vary in size from a half inch to several inches. Hives can appear all over the body. Or they may appear on only one part of the body.  What causes hives?  Hives can be caused by food and beverages such as:    Tree nuts (almonds, walnuts, hazelnuts)    Peanuts    Eggs    Shellfish    Milk  Hives can also be caused by medicines such as:    Antibiotics, especially penicillin and sulfa-based medicines     Anticonvulsant or antiseizure medicines     Chemotherapy medicines   Other causes of hives include:    Infection or virus    Heat    Cold air or cold water    Exercise    Scratching or rubbing your skin, or wearing tight-fitting clothes that rub your skin    Being exposed to sunlight or light from a light bulb, in rare cases    Inhaled-chemicals in the environment from foods and drugs, insects, plants, or other sources  In some cases, hives may occur again and again with no specific cause.  If you have hives    Stay away from the food, drink, medicine, or other thing that may be causing the hives.    Ask your healthcare provider how to control itchy or irritated skin.    Talk with your healthcare provider right away if you think a medicine gave you hives.  Watch for anaphylaxis  If you have hives, watch for symptoms of a severe reaction that can affect your entire body. This is called anaphylaxis. Symptoms can include swollen areas of the body, wheezing, trouble breathing or  swallowing, and a hoarse voice. This reaction may happen right away. Or it may happen in an hour or more. In extreme cases, the airways from mouth to lungs may swell and make breathing difficult. This is a medical emergency. Use epinephrine medicine if you have it, and call 911 or go to the emergency room.     When to call your healthcare provider  Call your healthcare provider if:    Your hives feel uncomfortable    You have never had hives before    Your symptoms don't go away or come back    Your symptoms get worse or new symptoms develop such as:   ? Sneezing, coughing, runny or stuffy nose  ? Itching of the eyes, nose, or roof of the mouth  ? Itching, burning, stinging, or pain  ? Dry, flaky, cracking, or scaly skin  ? Red or purple spots  Call 911  Call 911 right away if you have:    Swelling in your lips, tongue, or throat, called angioedema    Drooling    Trouble breathing, talking, or swallowing    Cool, moist or pale (blue in color) skin    Fast and weak heartbeat    Wheezing or short of breath    Feeling lightheaded or confused    Diarrhea    Severe nausea or vomiting    Seizure    Feeling dizzy or weak, or a sudden drop in blood pressure   Date Last Reviewed: 4/1/2017 2000-2018 [employer]. [address] [place] [address], [place] [address]. All rights reserved. This information is not intended as a substitute for professional medical care. Always follow your healthcare professional's instructions.           Patient Education     Hives (Adult)  Hives are pink or red bumps on the skin. These bumps are also known as wheals. The bumps can itch, burn, or sting. Hives can occur anywhere on the body. They vary in size and shape and can form in clusters. Individual hives can appear and go away quickly. New hives may develop as old ones fade. Hives are common and usually harmless. Occasionally hives are a sign of a serious allergy.  Hives are often caused by an allergic reaction. It may be an allergic  reaction to foods such as fruit, shellfish, chocolate, nuts, or tomatoes. It may be a reaction to pollens, animal fur, or mold spores. Medicines, chemicals, and insect bites can also cause hives. And hives can be caused by hot sun or cold air. The cause of hives can be difficult to find.  You may be given medicines to relieve swelling and itching. Follow all instructions when using these medicines. The hives will usually fade in a few days, but can last up to 2 weeks.  Home care  Follow these tips:    Try to find the cause of the hives and eliminate it. Discuss possible causes with your healthcare provider. Future reactions to the same allergen may be worse.    Don t scratch the hives. Scratching will delay healing. To reduce itching, apply cool, wet compresses to the skin.    Dress in soft, loose cotton clothing.    Don t bathe in hot water. This can make the itching worse.    Apply an ice pack or cool pack wrapped in a thin towel to your skin. This will help reduce redness and itching. But if your hives were caused by exposure to cold, then do not apply more cold to them.    You may use over-the counter antihistamines to reduce itching. Some older antihistamines, such as diphenhydramine and chlorpheniramine, are inexpensive. But they need to be taken often and may make you sleepy. They are best used at bedtime. Don t use diphenhydramine if you have glaucoma or have trouble urinating because of an enlarged prostate. Newer antihistamines, such as loratadine, cetirizine, and fexofenadine, are generally more expensive. But they tend to have fewer side effects, such as drowsiness. They can be taken less often.    Another type of antihistamine is used to treat heartburn. This type includes ranitidine, nizatidine, famotidine, and cimetidine. These are sometimes used along with the above antihistamines if a single medicine is not working.  Follow-up care  Follow up with your healthcare provider if your symptoms don't get  better in 2 days. Ask your provider about allergy testing if you have had a severe reaction, or have had several episodes of hives. He or she can use the allergy testing to find out what you are allergic to.  When to seek medical advice  Call your healthcare provider right away if any of these occur:    Fever of 100.4 F (38.0 C) or higher, or as directed by your healthcare provider    Redness, swelling, or pain    Foul-smelling fluid coming from the rash  Call 911  Call 911 if any of the following occur:    Swelling of the face, throat, or tongue    Trouble breathing or swallowing    Dizziness, weakness, or fainting  Date Last Reviewed: 9/1/2016 2000-2018 [employer]. [address] [place] [address], [place] [address]. All rights reserved. This information is not intended as a substitute for professional medical care. Always follow your healthcare professional's instructions.           Patient Education     General Allergic Reactions  An allergic reaction is a set of symptoms caused by an allergen. An allergen is something that causes a person s immune system to react. When a person comes in contact with an allergen, it causes the body to release chemicals. These include the chemical histamine. Histamine causes swelling and itching. It may affect the entire body. This is called a general allergic reaction. Often symptoms affect only 1 part of the body. This is called a local allergic reaction.  You are having an allergic reaction. Almost anything can cause one. Different people are allergic to different things. It is usually something that you ate or swallowed, came into contact with by getting or putting it on your skin or clothes, or something you breathed in the air. This can be very annoying and sometimes scary.  Most of us think of allergic reactions when we have a rash or itchy skin. Symptoms can include:    Itching of the eyes, nose, and roof of the mouth    Runny or stuffy nose    Watery  eyes     Sneezing or coughing     A blocked feeling in the ear    Red, itchy rash called hives    Red and purple spots    Rash, redness, welts, blisters    Itching, burning, stinging, pain    Dry, flaky, cracking, scaly skin  Severe symptoms include:    Swelling of the face, lips, or other parts of the body    Hoarse voice    Trouble swallowing, feeling like your throat is closing    Trouble breathing, wheezing    Nausea, vomiting, diarrhea, stomach cramps    Feeling faint or lightheaded, rapid heart rate  Sometimes the cause may be obvious. But there are so many things that can cause a reaction that you may not be able to figure out. The most important things to help find your allergen are:    Remembering when it started    What you were doing at the time or just before that    Any activities you were involved in    Any new products or contacts  Below are some common causes. But remember that almost anything can cause a reaction. You may not even be aware that you came into contact with one of these things:    Dust, mold, pollen    Plants (common ones are poison ivy and poison oak, but there are many others)     Animals    Foods such as shrimp, shellfish, peanuts, milk products, gluten, and eggs. Also food colorings, flavorings, and additives.    Insect bites or stings such as bees, mosquitos, fleas, ticks    Medicines such as penicillin, sulfa medicines, amoxicillin, aspirin, and ibuprofen. But any medicine can cause a reaction.    Jewelry such as nickel or gold. This can be new, or something you ve worn for a while, including zippers and buttons.    Latex such as in gloves, clothes, toys, balloons, or some tapes. Some people allergic to latex may also have problems with foods like bananas, avocados, kiwi, papaya, or chestnuts.    Lotions, perfumes, cosmetics, soaps, shampoos, skincare products, nail products    Chemicals or dyes in clothing, linen, , hair dyes, soaps, iodine  Many viruses and common colds  can cause a rash that is not an allergic reaction. Sometimes it is hard to tell the difference between allergies, sensitivity, or an intolerance to something. This is especially true with food. Many things can cause diarrhea, vomiting, stomach cramps, and skin irritation.  Home care    The goal of treatment is to help relieve the symptoms and get you feeling better. The rash will usually fade over several days. But it can sometimes last a couple of weeks. Over the next couple of days, there may be times when it is gets a little worse, and then better again. Here are some things to do:    If you know what you are allergic to, stay away from it. Future reactions could be worse than this one.    Avoid tight clothing and anything that heats up your skin (hot showers or baths, direct sunlight). Heat will make itching worse.    An ice pack will relieve local areas of intense itching and redness. To make an ice pack, put ice cubes in a plastic bag that seals at the top. Wrap it in a thin, clean towel. Don t put the ice directly on the skin because it can damage the skin.    Oral diphenhydramine is an over-the-counter antihistamine sold at pharmacy and grocery stores. Unless a prescription antihistamine was given, diphenhydramine may be used to reduce itching if large areas of the skin are involved. It may make you sleepy. So be careful using it in the daytime or when going to school, working, or driving. Note: Don t use diphenhydramine if you have glaucoma or if you are a man with trouble urinating due to an enlarged prostate. There are other antihistamines that won t make you so sleepy. These are good choices for daytime use. Ask your pharmacist for suggestions.    Don t use diphenhydramine cream on your skin. It can cause a further reaction in some people.    To help prevent an infection, don't scratch the affected area. Scratching may worsen the reaction and damage your skin. It can also lead to an infection. Always  check the affected for signs of an infection.    Call your healthcare provider and ask what you can use to help decrease the itching.    To decrease allergic reactions, try the following:      Use heat-steam to clean your home    Use high-efficiency particulate (HEPA) vacuums and filters    Stay away from food and pet triggers    Kill any cockroaches    Clean your house often  Follow-up care  Follow up with your healthcare provider, or as advised. If you had a severe reaction today, or if you have had several mild to medium allergic reactions in the past, ask your provider about allergy testing. This can help you find out what you are allergic to. If your reaction included dizziness, fainting, or trouble breathing or swallowing, ask your provider about carrying auto-injectable epinephrine.  Call 911  Call 911 if any of these occur:    Trouble breathing or swallowing, wheezing    Cool, moist, pale skin    Shortness of breath    Hoarse voice or trouble speaking    Confused     Very drowsy or trouble awakening    Fainting or loss of consciousness    Rapid heart rate    Feeling of dizziness or weakness or a sudden drop in blood pressure    Feeling of doom    Feeling lightheaded    Severe nausea or vomiting, or diarrhea    Seizure    Swelling in the face, eyelids, lips, mouth, throat or tongue    Drooling  When to seek medical advice  Call your healthcare provider right away if any of these occur:    Spreading areas of itching, redness or swelling    Nausea or stomach cramps or abdominal pain    Continuing or recurring symptoms    Spreading areas of redness, swelling, or itching    Signs of infection at the affected site:  ? Spreading redness  ? Increased pain or swelling  ? Fluid or colored drainage from the site  ? Fever of 100.4 F (38 C) or above lasting for 24 to 48 hours, or as directed by your provider  Date Last Reviewed: 3/1/2017    5552-5289 [employer]. [address] [place] [address].  All rights reserved. This information is not intended as a substitute for professional medical care. Always follow your healthcare professional's instructions.           Patient Education     Angioedema  Angioedema (FA-[id number]-ax-eh-CHIOMA-muh) is a sudden appearance of swollen patches (edema) on the skin or mucous membranes. It most often involves the face, lips, mouth, tongue, back of throat, or vocal cords. It may also occur in other places, such as the arms or legs. A rash may also appear during the first 4 days of this illness.  There are different types of angioedema. Your symptoms will depend on what type of angioedema you have. Swelling and redness may be the main symptoms. Like allergic reactions, angioedema may include:    Rash, hives, redness, welts, blisters    Itching, burning, stinging, pain    Dry, flaky, cracking, or scaly skin    Swelling of the face, lips, tongue, or other parts of the body  More severe symptoms may include:    Trouble swallowing, or feeling like your throat is closing    Trouble breathing or wheezing    Hoarse voice or trouble speaking    Nausea, vomiting, diarrhea, or stomach cramps    Feeling faint or lightheaded, rapid heart rate, or low blood pressure  Angioedema can be triggered by exposure to certain substances. Medical conditions involving the immune systems and certain infections may cause it. In rare cases, angioedema can be hereditary. Sometimes the cause may be very clear. However, it is often hard to find a cause. The most common causes include:    Foods, such as shrimp, shellfish, peanuts, milk products, gluten, and eggs; also colorings, flavorings, and additives    Insect bites or stings, from bees, mosquitos, fleas, or ticks    Medicines, such as ACE inhibitors, penicillin, sulfa medicines, amoxicillin, aspirin, and ibuprofen    Latex, which may be in gloves, clothes, toys, balloons, and some kinds of tape. People who are allergic to latex may have problems with foods such  as bananas, avocados, kiwi, papaya, or chestnuts.    Stress    Heat, cold, or sunlight  The most common cause of angioedema is a reaction to a class of medicines called ACE inhibitors. These are used to treat high blood pressure. ACE inhibitors include captopril, enalapril, and lisinopril. Angiodema can happen even after you have been taking the medicine for some time. Tell your doctor if you have angioedema symptoms and are taking any of these medicines. Angioedema may recur. It is important to watch for the earliest signs of this condition (see the list below). Contact your healthcare provider right away if swelling involves the face, mouth, or throat.  Home care  Rest quietly today. Don't do vigorous physical activity.  Medicines: The healthcare provider may prescribe medicines for itching, swelling, or pain. Follow the healthcare provider s instructions when taking these medicines.    Oral diphenhydramine is an antihistamine available without a prescription. Unless a prescription antihistamine was given, diphenhydramine may be used to reduce widespread itching. It may make you sleepy, so be careful using it when going to school, working, or driving. (Note: Do not use diphenhydramine if you have glaucoma or if you are a man who has trouble urinating due to an enlarged prostate.) Loratadine is an antihistamine that may cause less drowsiness.    Don't use diphenhydramine cream on your skin. Some people can have an allergic reaction to this.    Calamine lotion or oatmeal baths sometimes help with itching.    You may use acetaminophen or ibuprofen for pain, unless another pain medicine was prescribed.    If you were told that your angioedema was caused by a medicine you are taking, you must stop taking it. Ask your healthcare provider for a different one. In the future, advise medical staff that you are allergic to this medicine.    If medicine was prescribed, such as steroids or antihistamines, be sure you  understand what the medicine is and how to take it.   General care    Make sure you do not scratch areas of the body that had a reaction. This will help prevent infection.     Stay away from air pollution, tobacco, and wood smoke. Also stay away from cold temperatures. These things can make allergy symptoms worse.    Try to find out what cause your reaction. Make sure to remove the allergen. Future reactions may be worse.     If you have a serious allergy, wear a medical alert bracelet that notes this allergy.    If the healthcare provider prescribed an epinephrine auto injector kit, keep it with you at all times.     Tell all care providers about your allergy. Ask them h ow to use any prescribed medicines.    Keep a record of allergies and symptoms, and when they occurred. This will help your provider treat you over time.   Follow-up care  Follow up with your healthcare provider, or as advised. You may need to see an allergist. An allergist can help find the cause of an allergic reaction and give recommendations on how to prevent future reactions.  Call 911  Call 911 right away if any of these occur:    Trouble breathing or swallowing, or wheezing    Hoarse voice or trouble speaking, or drooling    Chest pain or tightness    Confusion, lightheadedness, or dizziness    Extreme drowsiness or trouble awakening    Fainting or loss of consciousness    Rapid heart rate    Vomiting blood, or large amounts of blood in stool    Seizure    Nausea, vomiting, diarrhea, abdominal pain, or stomach cramps  When to seek medical attention  Call your healthcare provider right away if any of the following occur:    Symptoms don't go away    Symptoms come back    Symptoms get worse or new symptoms develop    Hives feel uncomfortable    Fever of 100.4 F (38 C), or as directed by your healthcare provider  Date Last Reviewed: 5/1/2017 2000-2018 [employer]. [address] [facility], [place] [address]. All rights reserved.  This information is not intended as a substitute for professional medical care. Always follow your healthcare professional's instructions.

## 2019-07-25 ENCOUNTER — THERAPY VISIT (OUTPATIENT)
Dept: PHYSICAL THERAPY | Facility: CLINIC | Age: 24
End: 2019-07-25
Payer: COMMERCIAL

## 2019-07-25 DIAGNOSIS — G56.22 ULNAR NEURITIS, LEFT: ICD-10-CM

## 2019-07-25 DIAGNOSIS — M25.561 ACUTE PAIN OF RIGHT KNEE: ICD-10-CM

## 2019-07-25 PROCEDURE — 97161 PT EVAL LOW COMPLEX 20 MIN: CPT | Mod: GP | Performed by: PHYSICAL THERAPIST

## 2019-07-25 PROCEDURE — 97110 THERAPEUTIC EXERCISES: CPT | Mod: GP | Performed by: PHYSICAL THERAPIST

## 2019-07-25 PROCEDURE — 97140 MANUAL THERAPY 1/> REGIONS: CPT | Mod: GP | Performed by: PHYSICAL THERAPIST

## 2019-07-25 ASSESSMENT — ACTIVITIES OF DAILY LIVING (ADL)
GIVING WAY, BUCKLING OR SHIFTING OF KNEE: I HAVE THE SYMPTOM BUT IT DOES NOT AFFECT MY ACTIVITY
STAND: ACTIVITY IS MINIMALLY DIFFICULT
WALK: ACTIVITY IS MINIMALLY DIFFICULT
LIMPING: THE SYMPTOM AFFECTS MY ACTIVITY SLIGHTLY
SQUAT: ACTIVITY IS NOT DIFFICULT
PAIN: THE SYMPTOM AFFECTS MY ACTIVITY SLIGHTLY
GO DOWN STAIRS: ACTIVITY IS NOT DIFFICULT
GO UP STAIRS: ACTIVITY IS NOT DIFFICULT
KNEE_ACTIVITY_OF_DAILY_LIVING_SUM: 58
KNEEL ON THE FRONT OF YOUR KNEE: ACTIVITY IS SOMEWHAT DIFFICULT
SWELLING: I DO NOT HAVE THE SYMPTOM
SIT WITH YOUR KNEE BENT: ACTIVITY IS MINIMALLY DIFFICULT
RAW_SCORE: 58
RISE FROM A CHAIR: ACTIVITY IS NOT DIFFICULT
STIFFNESS: I HAVE THE SYMPTOM BUT IT DOES NOT AFFECT MY ACTIVITY
HOW_WOULD_YOU_RATE_THE_OVERALL_FUNCTION_OF_YOUR_KNEE_DURING_YOUR_USUAL_DAILY_ACTIVITIES?: NEARLY NORMAL
HOW_WOULD_YOU_RATE_THE_CURRENT_FUNCTION_OF_YOUR_KNEE_DURING_YOUR_USUAL_DAILY_ACTIVITIES_ON_A_SCALE_FROM_0_TO_100_WITH_100_BEING_YOUR_LEVEL_OF_KNEE_FUNCTION_PRIOR_TO_YOUR_INJURY_AND_0_BEING_THE_INABILITY_TO_PERFORM_ANY_OF_YOUR_USUAL_DAILY_ACTIVITIES?: 98
WEAKNESS: I HAVE THE SYMPTOM BUT IT DOES NOT AFFECT MY ACTIVITY
AS_A_RESULT_OF_YOUR_KNEE_INJURY,_HOW_WOULD_YOU_RATE_YOUR_CURRENT_LEVEL_OF_DAILY_ACTIVITY?: NEARLY NORMAL
KNEE_ACTIVITY_OF_DAILY_LIVING_SCORE: 82.86

## 2019-07-25 NOTE — PROGRESS NOTES
[facility] Initial Evaluation  Subjective:  The history is provided by the patient.   Juanita Gonzalez being seen for Rt knee pain, thoracic pain remains .   Problem began 7/1/2018.   and reported as 4/10 on pain scale. General health as reported by patient is fair. Pertinent medical history includes:  Asthma and overweight.    Surgeries include:  Orthopedic surgery (hand and jaw ).  Current medications:  Muscle relaxants.      and is intermittent.        Patient is  .   Barriers include:  None as reported by patient.  Red flags:  None as reported by patient.                      Objective:  System         Lumbar/SI Evaluation  ROM:  AROM Lumbar: normal                        SI joint/Sacrum:    Rt SI dysf, pelvic asym Rt >left       Left negative at:    Ilium Ventromedial  Right positive at:    Ilium Ventromedial    Sacral conclusion right:  Posterior inominate, locked, upslip, outflare and descended pubic sym                                       Knee Evaluation:    Ligament Testing:    Varus 0:  Left:  Gr I   Right:  Gr I  Varus 30:  Left:  Gr I  Right:  Gr I      Anterior Drawer:  Left:  Gr I    Right:  Gr II  Posterior Drawer: Left:  Neg  Right:  Neg    Special Tests:       Right knee negative for the following special tests:  Meniscal and Patellar Compression  Palpation:  Palpation of knee: med epichondyle/ pes anserine     Right knee tenderness present at:  Patellar Inferior  Right knee tenderness not present at:  Patellar Tendon and Popliteal    Mobility Testing:  Normal                  General     ROS    Assessment/Plan:    Patient is a 23 year old female with right side knee complaints.    Patient has the following significant findings with corresponding treatment plan.                Diagnosis 1:  Knee pain Rt   Pain -  hot/cold therapy, manual therapy, self management and home program  Inflammation - cold therapy and   Impaired gait - gait training  Impaired muscle  performance - neuro re-education  Decreased function - therapeutic activities  Instability -  Therapeutic Activity  Therapeutic Exercise    Therapy Evaluation Codes:   1) History comprised of:   Personal factors that impact the plan of care:      Coping style, Overall behavior pattern and Past/current experiences.    Comorbidity factors that impact the plan of care are:      Overweight.     Medications impacting care: None.  2) Examination of Body Systems comprised of:   Body structures and functions that impact the plan of care:      Knee.   Activity limitations that impact the plan of care are:      Bending, Cooking, Squatting/kneeling, Stairs, Standing, Walking and Working.  3) Clinical presentation characteristics are:   Stable/Uncomplicated.  4) Decision-Making    Low complexity using standardized patient assessment instrument and/or measureable assessment of functional outcome.  Cumulative Therapy Evaluation is: Low complexity.    Previous and current functional limitations:  (See Goal Flow Sheet for this information)    Short term and Long term goals: (See Goal Flow Sheet for this information)     Communication ability:  Patient appears to be able to clearly communicate and understand verbal and written communication and follow directions correctly.  Treatment Explanation - The following has been discussed with the patient:   RX ordered/plan of care  Anticipated outcomes  Possible risks and side effects  This patient would benefit from PT intervention to resume normal activities.   Rehab potential is good.    Frequency:  1 X week, once daily  Duration:  for 6 weeks  Discharge Plan:  Achieve all LTG.  Independent in home treatment program.  Reach maximal therapeutic benefit.    Please refer to the daily flowsheet for treatment today, total treatment time and time spent performing 1:1 timed codes.

## 2019-07-25 NOTE — LETTER
[facility]  [address] [facility]  [address]  [place] [address]  [phone]    2019    Re: Juanita Gonzalez   :   1995  MRN:  [id number]   REFERRING PHYSICIAN:   Tricia Rodriguez, [facility]  Date of Initial Evaluation:  2019  Visits:  Rxs Used: 1  Reason for Referral:     Acute pain of right knee  Ulnar neuritis, left    EVALUATION SUMMARY    [facility] Initial Evaluation  Subjective:  The history is provided by the patient.   Juanita Gonzalez being seen for Rt knee pain, thoracic pain remains .   Problem began 2018  and reported as 4/10 on pain scale. General health as reported by patient is fair. Pertinent medical history includes:  Asthma and overweight.    Surgeries include:  Orthopedic surgery (hand and jaw ).  Current medications:  Muscle relaxants and is intermittent.  Patient is  .   Barriers include:  None as reported by patient.  Red flags:  None as reported by patient.    Objective:  Lumbar/SI Evaluation  ROM:  AROM Lumbar: normal  SI joint/Sacrum:    Rt SI dysf, pelvic asym Rt >left   Left negative at:    Ilium Ventromedial  Right positive at:    Ilium Ventromedial  Sacral conclusion right:  Posterior inominate, locked, upslip, outflare and descended pubic sym             Knee Evaluation:  Ligament Testing:    Varus 0:  Left:  Gr I   Right:  Gr I  Varus 30:  Left:  Gr I  Right:  Gr I  Anterior Drawer:  Left:  Gr I    Right:  Gr II  Posterior Drawer: Left:  Neg  Right:  Neg    Special Tests:   Right knee negative for the following special tests:  Meniscal and Patellar Compression    Palpation:  Palpation of knee: med epichondyle/ pes anserine   Right knee tenderness present at:  Patellar Inferior  Right knee tenderness not present at:  Patellar Tendon and Popliteal  Re: Juanita Gonzalez   :   1995    Mobility Testing:  Normal    Assessment/Plan:    Patient is a 23 year old female with right side  knee complaints.    Patient has the following significant findings with corresponding treatment plan.                Diagnosis 1:  Knee pain Rt   Pain -  hot/cold therapy, manual therapy, self management and home program  Inflammation - cold therapy and US  Impaired gait - gait training  Impaired muscle performance - neuro re-education  Decreased function - therapeutic activities  Instability -  Therapeutic Activity  Therapeutic Exercise    Therapy Evaluation Codes:   1) History comprised of:   Personal factors that impact the plan of care:      Coping style, Overall behavior pattern and Past/current experiences.    Comorbidity factors that impact the plan of care are:      Overweight.     Medications impacting care: None.  2) Examination of Body Systems comprised of:   Body structures and functions that impact the plan of care:      Knee.   Activity limitations that impact the plan of care are:      Bending, Cooking, Squatting/kneeling, Stairs, Standing, Walking and Working.  3) Clinical presentation characteristics are:   Stable/Uncomplicated.  4) Decision-Making    Low complexity using standardized patient assessment instrument and/or measureable assessment of functional outcome.  Cumulative Therapy Evaluation is: Low complexity.    Previous and current functional limitations:  (See Goal Flow Sheet for this information)    Short term and Long term goals: (See Goal Flow Sheet for this information)     Communication ability:  Patient appears to be able to clearly communicate and understand verbal and written communication and follow directions correctly.  Treatment Explanation - The following has been discussed with the patient:   RX ordered/plan of care  Anticipated outcomes  Possible risks and side effects  This patient would benefit from PT intervention to resume normal activities.   Rehab potential is good.    Frequency:  1 X week, once daily  Duration:  for 6 weeks  Discharge Plan:  Achieve all  LTG.  Independent in home treatment program.  Reach maximal therapeutic benefit.  Re: Juanita Gonzalez   :   1995    Please refer to the daily flowsheet for treatment today, total treatment time and time spent performing 1:1 timed codes.         Thank you for your referral.    INQUIRIES  Therapist: Levy Grover, PT   [facility]  [address] [address]  [place] [address]  Phone: [phone]  Fax: [phone]

## 2019-08-01 ENCOUNTER — THERAPY VISIT (OUTPATIENT)
Dept: PHYSICAL THERAPY | Facility: CLINIC | Age: 24
End: 2019-08-01
Payer: COMMERCIAL

## 2019-08-01 DIAGNOSIS — M25.561 ACUTE PAIN OF RIGHT KNEE: Primary | ICD-10-CM

## 2019-08-01 PROCEDURE — 97035 APP MDLTY 1+ULTRASOUND EA 15: CPT | Mod: GP | Performed by: PHYSICAL THERAPIST

## 2019-08-01 PROCEDURE — 97110 THERAPEUTIC EXERCISES: CPT | Mod: GP | Performed by: PHYSICAL THERAPIST

## 2019-08-06 NOTE — PROGRESS NOTES
Subjective     Juanita Gonzalez is a 23 year old female who presents to clinic today for the following health issues:    HPI     Medication Followup of Adderral 5 mg    Taking Medication as prescribed: yes    Side Effects:  None    Medication Helping Symptoms:  yes     Patient notes that low dose adderall helps with inattention.  She is able to sleep with using the immediate release formula.  She denies any side effects.  She had noted THC on her urine drug screen.  She denies routine use and is agreeable to not using while on adderall.  She is not taking med twice daily and does not feel she need it twice daily.    Patient is interested in help with weight loss.  She does not have an ideal weight, she just wants to be healthier.  Her lowest weight was 175 lbs and that was in 8th grade.  Patient notes being overweight as a child.  She notes that her father is overweight as well.  She lost weight with [employer] in the past.      Patient does not eat regular meals.  She notes binge eating at night.  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  She feels this may be her biggest barrier due to food always being available.  Patient is not consistent with exercise.  She sometimes runs.  Patient denies sleep issues.    Patient notes significant stress as she is applying for new jobs and her mom is moving to [place].  She feels her mood is doing okay.    Patient notes a history of nocturnal enuresis.  She was on medication as a child and symptoms have continued into adulthood.  She requests a urology referral.  She restricts fluids prior to bed and toilets before bed.      Patient notes persistent left lateral foot pain with taking first steps from a seated position. She tries to wear supportive shoes at all times.  She denies injury.    Patient was treated for BV several weeks ago and notes an intermittent vaginal discharge.  She denies pruritis.        Patient Active Problem List    Diagnosis     Obesity     Attention deficit hyperactivity disorder (ADHD)     Disturbance in sleep behavior     Acanthosis nigricans     Acne     Seasonal allergies     Eczema     Vitamin D deficiency     Insulin resistance     Anisometropia     Dysmetabolic syndrome X     Myopia     Enchondroma of wrist or hand     Osteoma     Café au lait spot     Neck pain     Bilateral thoracic back pain     Morbid obesity (H)     Acute pain of right knee     Past Surgical History:   Procedure Laterality Date     DENTAL SURGERY       GRAFT BONE TO FINGER  3/18/2013    Procedure: GRAFT BONE TO FINGER;  Right Second Metacarpal Curettage and Allograft;  Surgeon: Jeremiah Yu MD;  Location: [facility] OR     HC TOOTH EXTRACTION W/FORCEP       LE FORT ONE N/A 6/11/2015    Procedure: LE FORT ONE;  Surgeon: Vitaliy Yun DDS;  Location: [facility] OR     LE FORT ONE , OSTEOTOMY SAGITTAL SPLIT, COMBINED N/A 12/3/2014    Procedure: COMBINED LE FORT ONE, OSTEOTOMY SAGITTAL SPLIT;  Surgeon: Vitaliy Yun DDS;  Location: [facility] OR     TONGUE SURGERY  9/17/2014    tongue lesion removed       Social History     Tobacco Use     Smoking status: Never Smoker     Smokeless tobacco: Never Used   Substance Use Topics     Alcohol use: Yes     Comment: Occasional with friends.     Family History   Problem Relation Age of Onset     Asthma Mother      Obesity Father      Hypertension Maternal Grandmother      Cancer Maternal Grandfather      Hypertension Paternal Grandmother      Glaucoma Other         Mat Great Aunt/blindness     Glaucoma Maternal Aunt      Macular Degeneration No family hx of          Current Outpatient Medications   Medication Sig Dispense Refill     albuterol (PROAIR HFA) 108 (90 Base) MCG/ACT inhaler Inhale 2 puffs into the lungs every 6 hours 1 Inhaler 0     amphetamine-dextroamphetamine (ADDERALL) 5 MG tablet Take 1 tablet (5 mg) by mouth 2 times daily Must be 28 days between prescriptions. 30 tablet 0      "amphetamine-dextroamphetamine (ADDERALL) 5 MG tablet Take 1 tablet (5 mg) by mouth 2 times daily Must be 28 days between prescriptions. 30 tablet 0     montelukast (SINGULAIR) 10 MG tablet Take 1 tablet (10 mg) by mouth At Bedtime 90 tablet 3     tiZANidine (ZANAFLEX) 2 MG tablet Take 1-2 tablets (2-4 mg) by mouth 3 times daily as needed for muscle spasms 30 tablet 1     Allergies   Allergen Reactions     Bactrim Hives     BP Readings from Last 3 Encounters:   08/08/19 124/72   07/21/19 120/80   07/14/19 124/79    Wt Readings from Last 3 Encounters:   08/08/19 121.7 kg (268 lb 3.2 oz)   07/21/19 119.7 kg (264 lb)   07/14/19 119.7 kg (264 lb)                    Reviewed and updated as needed this visit by Provider  Tobacco  Allergies  Meds  Problems  Med Hx  Surg Hx  Fam Hx         Review of Systems   ROS COMP: Constitutional, HEENT, cardiovascular, pulmonary, gi and gu systems are negative, except as otherwise noted.      Objective    /72   Pulse 92   Temp 97.7  F (36.5  C) (Oral)   Resp 18   Ht 1.645 m (5' 4.75\")   Wt 121.7 kg (268 lb 3.2 oz)   LMP 07/21/2019   SpO2 97%   BMI 44.98 kg/m    Body mass index is 44.98 kg/m .  Physical Exam   GENERAL: healthy, alert and no distress  RESP: lungs clear to auscultation - no rales, rhonchi or wheezes  CV: regular rate and rhythm, normal S1 S2, no S3 or S4, no murmur, click or rub, no peripheral edema and peripheral pulses strong  MS: Left foot: full range of motion of ankle, non-tender to palpation.  PSYCH: mentation appears normal, affect normal/bright    Diagnostic Test Results:  Labs reviewed in Epic        Assessment & Plan     1. Attention deficit hyperactivity disorder (ADHD), predominantly inattentive type  Controlled substance agreement signed.    - amphetamine-dextroamphetamine (ADDERALL) 5 MG tablet; Take 1 tablet (5 mg) by mouth 2 times daily Must be 28 days between prescriptions.  Dispense: 30 tablet; Refill: 0  - " "amphetamine-dextroamphetamine (ADDERALL) 5 MG tablet; Take 1 tablet (5 mg) by mouth 2 times daily Must be 28 days between prescriptions.  Dispense: 30 tablet; Refill: 0    2. Mild intermittent asthma without complication  Stable.  Continue current treatment plan and medications.   - montelukast (SINGULAIR) 10 MG tablet; Take 1 tablet (10 mg) by mouth At Bedtime  Dispense: 90 tablet; Refill: 3    3. Vaginal discharge    - Wet prep    4. Morbid obesity (H)  Patient to trial topamax once she starts contraception.  She plans follow-up with Ob/Gyn for placement of IUD and will contact clinic to start topamax.  We discussed her eating regular meals, avoiding night eating and starting to exercise regularly.    5. Nocturnal enuresis  Patient to avoid caffeine in the evening, continue to restrict fluids, and toilet before bed.  We also discussed setting an alarm to wake patient to urinate.  Patient to follow-up with urology.  - UROLOGY ADULT REFERRAL    6. Left foot pain  Patient to wear hard soled shoes at all times.  Possibly stress reaction.  Follow-up with podiatry if not improving.  - PODIATRY/FOOT & ANKLE SURGERY REFERRAL     BMI:   Estimated body mass index is 44.98 kg/m  as calculated from the following:    Height as of this encounter: 1.645 m (5' 4.75\").    Weight as of this encounter: 121.7 kg (268 lb 3.2 oz).   Weight management plan: Discussed healthy diet and exercise guidelines            Return in about 6 months (around 2/8/2020) for Medication Recheck- adderall.    PINKY Hines [facility]    "

## 2019-08-07 ENCOUNTER — THERAPY VISIT (OUTPATIENT)
Dept: PHYSICAL THERAPY | Facility: CLINIC | Age: 24
End: 2019-08-07
Payer: COMMERCIAL

## 2019-08-07 DIAGNOSIS — M25.561 ACUTE PAIN OF RIGHT KNEE: ICD-10-CM

## 2019-08-07 PROCEDURE — 97110 THERAPEUTIC EXERCISES: CPT | Mod: GP

## 2019-08-07 PROCEDURE — 97035 APP MDLTY 1+ULTRASOUND EA 15: CPT | Mod: GP

## 2019-08-08 ENCOUNTER — OFFICE VISIT (OUTPATIENT)
Dept: FAMILY MEDICINE | Facility: CLINIC | Age: 24
End: 2019-08-08
Payer: COMMERCIAL

## 2019-08-08 VITALS
HEART RATE: 92 BPM | BODY MASS INDEX: 44.68 KG/M2 | RESPIRATION RATE: 18 BRPM | SYSTOLIC BLOOD PRESSURE: 124 MMHG | WEIGHT: 268.2 LBS | OXYGEN SATURATION: 97 % | DIASTOLIC BLOOD PRESSURE: 72 MMHG | TEMPERATURE: 97.7 F | HEIGHT: 65 IN

## 2019-08-08 DIAGNOSIS — N39.44 NOCTURNAL ENURESIS: ICD-10-CM

## 2019-08-08 DIAGNOSIS — E66.01 MORBID OBESITY (H): ICD-10-CM

## 2019-08-08 DIAGNOSIS — J45.20 MILD INTERMITTENT ASTHMA WITHOUT COMPLICATION: ICD-10-CM

## 2019-08-08 DIAGNOSIS — F90.0 ATTENTION DEFICIT HYPERACTIVITY DISORDER (ADHD), PREDOMINANTLY INATTENTIVE TYPE: Primary | ICD-10-CM

## 2019-08-08 DIAGNOSIS — M79.672 LEFT FOOT PAIN: ICD-10-CM

## 2019-08-08 DIAGNOSIS — N89.8 VAGINAL DISCHARGE: ICD-10-CM

## 2019-08-08 LAB
SPECIMEN SOURCE: NORMAL
WET PREP SPEC: NORMAL

## 2019-08-08 PROCEDURE — 99214 OFFICE O/P EST MOD 30 MIN: CPT | Performed by: NURSE PRACTITIONER

## 2019-08-08 PROCEDURE — 87210 SMEAR WET MOUNT SALINE/INK: CPT | Performed by: NURSE PRACTITIONER

## 2019-08-08 RX ORDER — DEXTROAMPHETAMINE SACCHARATE, AMPHETAMINE ASPARTATE, DEXTROAMPHETAMINE SULFATE AND AMPHETAMINE SULFATE 1.25; 1.25; 1.25; 1.25 MG/1; MG/1; MG/1; MG/1
5 TABLET ORAL 2 TIMES DAILY
Qty: 30 TABLET | Refills: 0 | Status: SHIPPED | OUTPATIENT
Start: 2019-08-08 | End: 2019-09-17

## 2019-08-08 RX ORDER — MONTELUKAST SODIUM 10 MG/1
10 TABLET ORAL AT BEDTIME
Qty: 90 TABLET | Refills: 3 | Status: SHIPPED | OUTPATIENT
Start: 2019-08-08 | End: 2020-07-21

## 2019-08-08 ASSESSMENT — PAIN SCALES - GENERAL: PAINLEVEL: NO PAIN (0)

## 2019-08-08 ASSESSMENT — MIFFLIN-ST. JEOR: SCORE: 1978.46

## 2019-08-08 NOTE — LETTER
[facility]  08/08/19    Patient: Juanita Gonzalez  YOB: 1995  Medical Record Number: [id number]  CSN: [id number]                                                                              Non-opioid Controlled Substance Agreement    I understand that my care provider has prescribed a controlled substance to help manage my condition(s). I am taking this medicine to help me function or work. I know this is strong medicine, and that it can cause serious side effects. Controlled substances can be sedating, addicting and may cause a dependency on the drug. They can affect my ability to drive or think, and cause depression. They need to be taken exactly as prescribed. Combining controlled substances with certain medicines or chemicals (such as cocaine, sedatives and tranquilizers, sleeping pills, meth) can be dangerous or even fatal. Also, if I stop controlled substances suddenly, I may have severe withdrawal symptoms.  If not helpful, I may be asked to stop them.    The risks, benefits, and side effects of these medicine(s) were explained to me. I agree that:    1. I will take part in other treatments as advised by my care team. This may be psychiatry or counseling, physical therapy, behavioral therapy, group treatment or a referral to a pain clinic. I will reduce or stop my medicine when my care team tells me to do so.  2. I will take my medicines as prescribed. I will not change the dose or schedule unless my care team tells me to. There will be no refills if I  run out early.   I may be contactedwithout warning and asked to complete a urine drug test or pill count at any time.   3. I will keep all my appointments, and understand this is part of the monitoring of controlled substances. My care team may require an office visit for EVERY controlled substance refill. If I miss appointments or don t follow instructions, my care team may stop my medicine.  4. I will not ask other providers  to prescribe controlled substances, and I will not accept controlled substances from other people. If I need another prescribed controlled substance for a new reason, I will tell my care team within 1 business day.  5. I will use one pharmacy to fill all of my controlled substance prescriptions, and it is up to me to make sure that I do not run out of my medicines on weekends or holidays. If my care team is willing to refill my controlled substance prescription without a visit, I must request refills only during office hours, refills may take up to 3 days to process, and it may take up to 5 to 7 days for my medicine to be mailed and ready at my pharmacy. Prescriptions will not be mailed anywhere except my pharmacy.    6. I am responsible for my prescriptions. If the medicine/prescription is lost or stolen, it will not be replaced. I also agree not to share controlled substance medicines with anyone.              [facility]  08/08/19  Patient:  Juanita Gonzalez  YOB: 1995  Medical Record Number: [id number]  CSN: [id number]    7. I agree to not use ANY illegal or recreational drugs. This includes marijuana, cocaine, bath salts or other drugs. I agree not to use alcohol unless my care team says I may. I agree to give urine samples whenever asked. If I don t give a urine sample, the care team may stop my medicine.    8. If I enroll in the [place] Medical Marijuana program, I will tell my care team. I will also sign an agreement to share my medical records with my care team.    9. I will bring in my list of medicines (or my medicine bottles) each time I come to the clinic.   10. I will tell my care team right away if I become pregnant or have a new medical problem treated outside of my regular clinic.  11. I understand that this medicine can affect my thinking and judgment. It may be unsafe for me to drive, use machinery and do dangerous tasks. I will not do any of these things until I  know how the medicine affects me. If my dose changes, I will wait to see how it affects me. I will contact my care team if I have concerns about medicine side effects.    I understand that if I do not follow any of the conditions above, my prescriptions or treatment may be stopped.      I agree that my provider, clinic care team, and pharmacy may work with any city, state or federal law enforcement agency that investigates the misuse, sale, or other diversion of my controlled medicine. I will allow my provider to discuss my care with or share a copy of this agreement with any other treating provider, pharmacy or emergency room where I receive care. I agree to give up (waive) any right of privacy or confidentiality with respect to these consents.   I have read this agreement and have asked questions about anything I did not understand.    ____________________________________________________    ____________  ________  Patient signature                                                         Date      Time    ____________________________________________________     ____________  ________  Witness                                                          Date      Time    ____________________________________________________  Provider signature

## 2019-08-08 NOTE — LETTER
My Depression Action Plan  Name: Juanita Gonzalez   Date of Birth 1995  Date: 8/8/2019    My doctor: Beronica Pires   My clinic: [facility]  [address]  [place] [address]  [phone]          GREEN    ZONE   Good Control    What it looks like:     Things are going generally well. You have normal up s and down s. You may even feel depressed from time to time, but bad moods usually last less than a day.   What you need to do:  1. Continue to care for yourself (see self care plan)  2. Check your depression survival kit and update it as needed  3. Follow your physician s recommendations including any medication.  4. Do not stop taking medication unless you consult with your physician first.           YELLOW         ZONE Getting Worse    What it looks like:     Depression is starting to interfere with your life.     It may be hard to get out of bed; you may be starting to isolate yourself from others.    Symptoms of depression are starting to last most all day and this has happened for several days.     You may have suicidal thoughts but they are not constant.   What you need to do:     1. Call your care team, your response to treatment will improve if you keep your care team informed of your progress. Yellow periods are signs an adjustment may need to be made.     2. Continue your self-care, even if you have to fake it!    3. Talk to someone in your support network    4. Open up your depression survival kit           RED    ZONE Medical Alert - Get Help    What it looks like:     Depression is seriously interfering with your life.     You may experience these or other symptoms: You can t get out of bed most days, can t work or engage in other necessary activities, you have trouble taking care of basic hygiene, or basic responsibilities, thoughts of suicide or death that will not go away, self-injurious behavior.     What you need to do:  1. Call your care  team and request a same-day appointment. If they are not available (weekends or after hours) call your local crisis line, emergency room or 911.            Depression Self Care Plan / Survival Kit    Self-Care for Depression  Here s the deal. Your body and mind are really not as separate as most people think.  What you do and think affects how you feel and how you feel influences what you do and think. This means if you do things that people who feel good do, it will help you feel better.  Sometimes this is all it takes.  There is also a place for medication and therapy depending on how severe your depression is, so be sure to consult with your medical provider and/ or Behavioral Health Consultant if your symptoms are worsening or not improving.     In order to better manage my stress, I will:    Exercise  Get some form of exercise, every day. This will help reduce pain and release endorphins, the  feel good  chemicals in your brain. This is almost as good as taking antidepressants!  This is not the same as joining a gym and then never going! (they count on that by the way ) It can be as simple as just going for a walk or doing some gardening, anything that will get you moving.      Hygiene   Maintain good hygiene (Get out of bed in the morning, Make your bed, Brush your teeth, Take a shower, and Get dressed like you were going to work, even if you are unemployed).  If your clothes don't fit try to get ones that do.    Diet  I will strive to eat foods that are good for me, drink plenty of water, and avoid excessive sugar, caffeine, alcohol, and other mood-altering substances.  Some foods that are helpful in depression are: complex carbohydrates, B vitamins, flaxseed, fish or fish oil, fresh fruits and vegetables.    Psychotherapy  I agree to participate in Individual Therapy (if recommended).    Medication  If prescribed medications, I agree to take them.  Missing doses can result in serious side effects.  I  understand that drinking alcohol, or other illicit drug use, may cause potential side effects.  I will not stop my medication abruptly without first discussing it with my provider.    Staying Connected With Others  I will stay in touch with my friends, family members, and my primary care provider/team.    Use your imagination  Be creative.  We all have a creative side; it doesn t matter if it s oil painting, sand castles, or mud pies! This will also kick up the endorphins.    Witness Beauty  (AKA stop and smell the roses) Take a look outside, even in mid-winter. Notice colors, textures. Watch the squirrels and birds.     Service to others  Be of service to others.  There is always someone else in need.  By helping others we can  get out of ourselves  and remember the really important things.  This also provides opportunities for practicing all the other parts of the program.    Humor  Laugh and be silly!  Adjust your TV habits for less news and crime-drama and more comedy.    Control your stress  Try breathing deep, massage therapy, biofeedback, and meditation. Find time to relax each day.     My support system    Clinic Contact:  Phone number:    Contact 1:  Phone number:    Contact 2:  Phone number:    Congregational/:  Phone number:    Therapist:  Phone number:    Local crisis center:    Phone number:    Other community support:  Phone number:

## 2019-08-08 NOTE — RESULT ENCOUNTER NOTE
Dear Juanita,    Your recent test results are attached.      No vaginal bacterial infection.      If you have any questions please feel free to contact [phone] or myself via [employer].    Sincerely,  Tricia Rodriguez, CNP

## 2019-08-19 ENCOUNTER — TELEPHONE (OUTPATIENT)
Dept: FAMILY MEDICINE | Facility: CLINIC | Age: 24
End: 2019-08-19

## 2019-08-19 NOTE — LETTER
[address] [id number] [phone]                                                                                                August 23, 2019    Juanita Gonzalez  [id number]  [facility] [address]        Dear Ms. Gonzalez,    At [facility] we care about your health and well-being. A review of your chart has indicated that you are due for a(n) Pap and physical examPlease contact us at [phone] to schedule your appointment.     If you have already had one or all of the above screening tests at another facility, please call us to update your chart.       Sincerely,      Your Care Team

## 2019-08-19 NOTE — TELEPHONE ENCOUNTER
Panel Management Review      Patient has the following on her problem list: None      Composite cancer screening  Chart review shows that this patient is due/due soon for the following Pap Smear  Summary:    Patient is due/failing the following:   PAP and PHYSICAL    Action needed:   Patient needs office visit for Physical and Pap.    Type of outreach:    Sent [employer] message.    Questions for provider review:    None                                                                                                                                    Yue Lomas MA       Chart routed to Care Team .

## 2019-08-23 ENCOUNTER — THERAPY VISIT (OUTPATIENT)
Dept: PHYSICAL THERAPY | Facility: CLINIC | Age: 24
End: 2019-08-23
Payer: COMMERCIAL

## 2019-08-23 DIAGNOSIS — M25.561 ACUTE PAIN OF RIGHT KNEE: ICD-10-CM

## 2019-08-23 PROCEDURE — 97110 THERAPEUTIC EXERCISES: CPT | Mod: GP | Performed by: PHYSICAL THERAPIST

## 2019-08-23 ASSESSMENT — ACTIVITIES OF DAILY LIVING (ADL)
KNEEL ON THE FRONT OF YOUR KNEE: ACTIVITY IS NOT DIFFICULT
PAIN: I HAVE THE SYMPTOM BUT IT DOES NOT AFFECT MY ACTIVITY
WALK: ACTIVITY IS NOT DIFFICULT
GO DOWN STAIRS: ACTIVITY IS NOT DIFFICULT
AS_A_RESULT_OF_YOUR_KNEE_INJURY,_HOW_WOULD_YOU_RATE_YOUR_CURRENT_LEVEL_OF_DAILY_ACTIVITY?: NORMAL
KNEE_ACTIVITY_OF_DAILY_LIVING_SCORE: 94.29
STIFFNESS: I HAVE THE SYMPTOM BUT IT DOES NOT AFFECT MY ACTIVITY
KNEE_ACTIVITY_OF_DAILY_LIVING_SUM: 66
RAW_SCORE: 66
HOW_WOULD_YOU_RATE_THE_CURRENT_FUNCTION_OF_YOUR_KNEE_DURING_YOUR_USUAL_DAILY_ACTIVITIES_ON_A_SCALE_FROM_0_TO_100_WITH_100_BEING_YOUR_LEVEL_OF_KNEE_FUNCTION_PRIOR_TO_YOUR_INJURY_AND_0_BEING_THE_INABILITY_TO_PERFORM_ANY_OF_YOUR_USUAL_DAILY_ACTIVITIES?: 95
STAND: ACTIVITY IS NOT DIFFICULT
SIT WITH YOUR KNEE BENT: ACTIVITY IS NOT DIFFICULT
WEAKNESS: I DO NOT HAVE THE SYMPTOM
LIMPING: I HAVE THE SYMPTOM BUT IT DOES NOT AFFECT MY ACTIVITY
SWELLING: I DO NOT HAVE THE SYMPTOM
GIVING WAY, BUCKLING OR SHIFTING OF KNEE: I DO NOT HAVE THE SYMPTOM
SQUAT: ACTIVITY IS MINIMALLY DIFFICULT
HOW_WOULD_YOU_RATE_THE_OVERALL_FUNCTION_OF_YOUR_KNEE_DURING_YOUR_USUAL_DAILY_ACTIVITIES?: NORMAL
RISE FROM A CHAIR: ACTIVITY IS NOT DIFFICULT
GO UP STAIRS: ACTIVITY IS NOT DIFFICULT

## 2019-08-23 NOTE — PROGRESS NOTES
Subjective:  HPI                    Objective:  System    Physical Exam    General     ROS    Assessment/Plan:    DISCHARGE REPORT    Progress reporting period is from 7.25 to 8.23.     SUBJECTIVE  Subjective: better, no pain now    Current Pain level: 0/10   Initial Pain level: 5/10   Changes in function: Yes, see goal flow sheet for change in function   Adverse reactions: None;   ,     The subjective and objective information are from the last SOAP note on this patient.    OBJECTIVE  Objective: neg TTP, indep HEP       ASSESSMENT/PLAN  Updated problem list and treatment plan: Diagnosis 1:  Acute knee pain     STG/LTGs have been met or progress has been made towards goals:  Yes (See Goal flow sheet completed today.)  Assessment of Progress: The patient has met all of their long term goals.  Self Management Plans:  Patient is independent in a home treatment program.  Patient is independent in self management of symptoms.    Juanita continues to require the following intervention to meet STG and LTG's:   We will discharge this patient from PT.    Recommendations:  This patient is ready to be discharged from therapy and continue their home treatment program.    Please refer to the daily flowsheet for treatment today, total treatment time and time spent performing 1:1 timed codes.

## 2019-08-29 ENCOUNTER — OFFICE VISIT (OUTPATIENT)
Dept: PODIATRY | Facility: CLINIC | Age: 24
End: 2019-08-29
Payer: COMMERCIAL

## 2019-08-29 ENCOUNTER — ANCILLARY PROCEDURE (OUTPATIENT)
Dept: GENERAL RADIOLOGY | Facility: CLINIC | Age: 24
End: 2019-08-29
Attending: PODIATRIST
Payer: COMMERCIAL

## 2019-08-29 VITALS
DIASTOLIC BLOOD PRESSURE: 77 MMHG | BODY MASS INDEX: 44.44 KG/M2 | HEART RATE: 88 BPM | SYSTOLIC BLOOD PRESSURE: 114 MMHG | WEIGHT: 265 LBS

## 2019-08-29 DIAGNOSIS — M21.6X2 PRONATION OF BOTH FEET: ICD-10-CM

## 2019-08-29 DIAGNOSIS — M21.6X1 PRONATION OF BOTH FEET: ICD-10-CM

## 2019-08-29 DIAGNOSIS — M21.6X2 PRONATION OF BOTH FEET: Primary | ICD-10-CM

## 2019-08-29 DIAGNOSIS — M79.672 PAIN IN LEFT FOOT: ICD-10-CM

## 2019-08-29 DIAGNOSIS — M21.6X1 PRONATION OF BOTH FEET: Primary | ICD-10-CM

## 2019-08-29 PROCEDURE — 73630 X-RAY EXAM OF FOOT: CPT | Mod: LT

## 2019-08-29 PROCEDURE — 99243 OFF/OP CNSLTJ NEW/EST LOW 30: CPT | Performed by: PODIATRIST

## 2019-08-29 NOTE — PATIENT INSTRUCTIONS
Weight management plan: Patient was referred to their PCP to discuss a diet and exercise plan.  We wish you continued good healing. If you have any questions or concerns, please do not hesitate to contact us at [phone]    Please remember to call and schedule a follow up appointment if one was recommended at your earliest convenience.   PODIATRY CLINIC HOURS  TELEPHONE NUMBER    Dr. Claus Norris D.P.M [facility]    Clinics:  [facility]    [facility]   Tuesday 1PM-6PM  [place]  Wednesday 7AM-2PM  [facility]  Thursday 10AM-6PM  [place]  Friday 7AM-3PM  [place]  Specialty schedulers:   [phone] to make an appointment with any Specialty Provider.        Urgent Care locations:    [facility] Monday-Friday 5 pm - 9 pm. Saturday-Sunday 9 am -5pm    Monday-Friday 11 am - 9 pm Saturday 9 am - 5 pm     Monday-Sunday 12 noon-8PM [phone]     If you need a medication refill, please contact us you may need lab work and/or a follow up visit prior to your refill (i.e. Antifungal medications).    [employer] (secure e-mail communication and access to your chart) to send a message or to make an appointment.    If MRI needed please call [facility] Moore at [phone]

## 2019-08-29 NOTE — PROGRESS NOTES
S: Patient seen today in consult from Tricia Rodriguez and complains of foot pain.  Points mostly to lateral fourth and fifth tarsometatarsal joint and somewhat sinus tarsi of left foot.  Has had this for 1 month.  Describes it as a burning pain.  Aggrevated by activity and relieved by rest.  Slowly getting worse.    What seems to bother her most with this is a positive history of post static dyskinesia.  Works at a job where standing a lot.  She wears shoes that are a couple years old.  Patient also has bilateral knee pain.    ROS:  A 10-point review of systems was performed and is positive for that noted in the HPI and as seen above.  All other areas are negative.          Allergies   Allergen Reactions     Bactrim Hives       Current Outpatient Medications   Medication Sig Dispense Refill     albuterol (PROAIR HFA) 108 (90 Base) MCG/ACT inhaler Inhale 2 puffs into the lungs every 6 hours 1 Inhaler 0     amphetamine-dextroamphetamine (ADDERALL) 5 MG tablet Take 1 tablet (5 mg) by mouth 2 times daily Must be 28 days between prescriptions. 30 tablet 0     amphetamine-dextroamphetamine (ADDERALL) 5 MG tablet Take 1 tablet (5 mg) by mouth 2 times daily Must be 28 days between prescriptions. 30 tablet 0     montelukast (SINGULAIR) 10 MG tablet Take 1 tablet (10 mg) by mouth At Bedtime 90 tablet 3     tiZANidine (ZANAFLEX) 2 MG tablet Take 1-2 tablets (2-4 mg) by mouth 3 times daily as needed for muscle spasms 30 tablet 1       Patient Active Problem List   Diagnosis     Obesity     Attention deficit hyperactivity disorder (ADHD)     Disturbance in sleep behavior     Acanthosis nigricans     Acne     Seasonal allergies     Eczema     Vitamin D deficiency     Insulin resistance     Anisometropia     Dysmetabolic syndrome X     Myopia     Enchondroma of wrist or hand     Osteoma     Café au lait spot     Neck pain     Bilateral thoracic back pain     Morbid obesity (H)       Past Medical History:   Diagnosis Date      Allergic conjunctivitis      Anisometropia      Asthma      Attention deficit disorder with hyperactivity(314.01)     stopped medication 1 year ago     Myopia      Nonorganic enuresis     voiding dysfunction      Obesity, unspecified      OCD (obsessive compulsive disorder)      Wears contact lenses        Past Surgical History:   Procedure Laterality Date     DENTAL SURGERY       GRAFT BONE TO FINGER  3/18/2013    Procedure: GRAFT BONE TO FINGER;  Right Second Metacarpal Curettage and Allograft;  Surgeon: Jeremiah Yu MD;  [facility]: [facility]     HC TOOTH EXTRACTION W/FORCEP       LE FORT ONE N/A 6/11/2015    Procedure: LE FORT ONE;  Surgeon: Vitaliy Yun DDS;  Location: [facility]     LE FORT ONE , OSTEOTOMY SAGITTAL SPLIT, COMBINED N/A 12/3/2014    Procedure: COMBINED LE FORT ONE, OSTEOTOMY SAGITTAL SPLIT;  Surgeon: Vitaliy Yun DDS;  Location: [facility]     TONGUE SURGERY  9/17/2014    tongue lesion removed       Family History   Problem Relation Age of Onset     Asthma Mother      Obesity Father      Hypertension Maternal Grandmother      Cancer Maternal Grandfather      Hypertension Paternal Grandmother      Glaucoma Other         Mat Great Aunt/blindness     Glaucoma Maternal Aunt      Macular Degeneration No family hx of        Social History     Tobacco Use     Smoking status: Never Smoker     Smokeless tobacco: Never Used   Substance Use Topics     Alcohol use: Yes     Comment: Occasional with friends.         Exam:    Vitals: /77 (BP Location: Left arm)   Pulse 88   Wt 120.2 kg (265 lb)   BMI 44.44 kg/m    BMI: Body mass index is 44.44 kg/m .  Height: Data Unavailable    Constitutional/ general:  Pt is in no apparent distress, appears well-nourished.  Cooperative with history and physical exam.     Psych:  The patient answered questions appropriately.  Normal affect.  Seems to have reasonable expectations, in terms of treatment.     Eyes:  Visual scanning/ tracking without deficit.      Ears:  Response to auditory stimuli is normal.  negative hearing aid devices.  Auricles in proper alignment.     Lymphatic:  Popliteal lymph nodes not enlarged.     Lungs:  Non labored breathing, non labored speech. No cough.  No audible wheezing. Even, quiet breathing.       Vascular:  positive pedal pulses bilaterally for both the DP and PT arteries.  CFT < 3 sec.  negative ankle edema.  positive pedal hair growth.    Neuro:  Alert and oriented x 3. Coordinated gait.  Light touch sensation is intact to the L4, L5, S1 distributions. No obvious deficits.  No evidence of neurological-based weakness, spasticity, or contracture in the lower extremities.      Derm: Normal texture and turgor.  No erythema, ecchymosis, or cyanosis.      Musculoskeletal:    Lower extremity muscle strength is normal.  Patient is ambulatory without an assistive device or brace.   Pronated arch with weightbearing.  No forefoot or rear foot deformities noted.  Normal ROM all fore foot and rearfoot joints.  No equinus.    No pain with stressing any muscle compartments.  No erythema edema or ecchymosis or masses noted.  Pain over left fourth and fifth tarsal metatarsal joint and to a lesser extent sinus tarsi.  No pain on the styloid process.  No pain with range of motion.  No pain with stressing any tendons.    Radiographic Exam:  X-Ray Findings:  I personally re unremarkable viewed the films.      A:  Pronation with left fourth and fifth tarsometatarsal joint capsulitis and sinus tarsi syndrome.    P:  X-rays taken today left foot.  Discussed the cause of this with the patient and how this relates to flat foot.   Patient will get new stiff supportive shoes and I made recommendations.  When she gets these she will wear them at all times until her pain resolves.  If she needs more support for her foot I made suggestions on over-the-counter orthotics.  We also discussed custom orthotics.  If she decides she would like a pair she will call me  and we will mail her a Rx for orthotics.  Discussed importance of wearing these in a good shoe at all times until this resolves.  Discussed other treatment options if not resolving.  RETURN TO CLINIC PRN.  Thank you for allowing me participate in the care of this patient.        Claus Norris, MURIEL LLAMAS, BUFFY

## 2019-08-29 NOTE — LETTER
8/29/2019         RE: Juanita Gonzalez  [id number]  [facility] [address]        Dear Colleague,    Thank you for referring your patient, Juanita Gonzalez, to the [facility]. Please see a copy of my visit note below.    S: Patient seen today in consult from Tricia Rodriguez and complains of foot pain.  Points mostly to lateral fourth and fifth tarsometatarsal joint and somewhat sinus tarsi of left foot.  Has had this for 1 month.  Describes it as a burning pain.  Aggrevated by activity and relieved by rest.  Slowly getting worse.    What seems to bother her most with this is a positive history of post static dyskinesia.  Works at a job where standing a lot.  She wears shoes that are a couple years old.  Patient also has bilateral knee pain.    ROS:  A 10-point review of systems was performed and is positive for that noted in the HPI and as seen above.  All other areas are negative.          Allergies   Allergen Reactions     Bactrim Hives       Current Outpatient Medications   Medication Sig Dispense Refill     albuterol (PROAIR HFA) 108 (90 Base) MCG/ACT inhaler Inhale 2 puffs into the lungs every 6 hours 1 Inhaler 0     amphetamine-dextroamphetamine (ADDERALL) 5 MG tablet Take 1 tablet (5 mg) by mouth 2 times daily Must be 28 days between prescriptions. 30 tablet 0     amphetamine-dextroamphetamine (ADDERALL) 5 MG tablet Take 1 tablet (5 mg) by mouth 2 times daily Must be 28 days between prescriptions. 30 tablet 0     montelukast (SINGULAIR) 10 MG tablet Take 1 tablet (10 mg) by mouth At Bedtime 90 tablet 3     tiZANidine (ZANAFLEX) 2 MG tablet Take 1-2 tablets (2-4 mg) by mouth 3 times daily as needed for muscle spasms 30 tablet 1       Patient Active Problem List   Diagnosis     Obesity     Attention deficit hyperactivity disorder (ADHD)     Disturbance in sleep behavior     Acanthosis nigricans     Acne     Seasonal allergies     Eczema     Vitamin D deficiency     Insulin  resistance     Anisometropia     Dysmetabolic syndrome X     Myopia     Enchondroma of wrist or hand     Osteoma     Café au lait spot     Neck pain     Bilateral thoracic back pain     Morbid obesity (H)       Past Medical History:   Diagnosis Date     Allergic conjunctivitis      Anisometropia      Asthma      Attention deficit disorder with hyperactivity(314.01)     stopped medication 1 year ago     Myopia      Nonorganic enuresis     voiding dysfunction      Obesity, unspecified      OCD (obsessive compulsive disorder)      Wears contact lenses        Past Surgical History:   Procedure Laterality Date     DENTAL SURGERY       GRAFT BONE TO FINGER  3/18/2013    Procedure: GRAFT BONE TO FINGER;  Right Second Metacarpal Curettage and Allograft;  Surgeon: Jeremiah Yu MD;  [facility]     HC TOOTH EXTRACTION W/FORCEP       LE FORT ONE N/A 6/11/2015    Procedure: LE FORT ONE;  Surgeon: Vitaliy Yun DDS;  [facility]     LE FORT ONE , OSTEOTOMY SAGITTAL SPLIT, COMBINED N/A 12/3/2014    Procedure: COMBINED LE FORT ONE, OSTEOTOMY SAGITTAL SPLIT;  Surgeon: Vitaliy Yun DDS;  [facility]     TONGUE SURGERY  9/17/2014    tongue lesion removed       Family History   Problem Relation Age of Onset     Asthma Mother      Obesity Father      Hypertension Maternal Grandmother      Cancer Maternal Grandfather      Hypertension Paternal Grandmother      Glaucoma Other         Mat Great Aunt/blindness     Glaucoma Maternal Aunt      Macular Degeneration No family hx of        Social History     Tobacco Use     Smoking status: Never Smoker     Smokeless tobacco: Never Used   Substance Use Topics     Alcohol use: Yes     Comment: Occasional with friends.         Exam:    Vitals: /77 (BP Location: Left arm)   Pulse 88   Wt 120.2 kg (265 lb)   BMI 44.44 kg/m     BMI: Body mass index is 44.44 kg/m .  Height: Data Unavailable    Constitutional/ general:  Pt is in no apparent distress, appears  well-nourished.  Cooperative with history and physical exam.     Psych:  The patient answered questions appropriately.  Normal affect.  Seems to have reasonable expectations, in terms of treatment.     Eyes:  Visual scanning/ tracking without deficit.     Ears:  Response to auditory stimuli is normal.  negative hearing aid devices.  Auricles in proper alignment.     Lymphatic:  Popliteal lymph nodes not enlarged.     Lungs:  Non labored breathing, non labored speech. No cough.  No audible wheezing. Even, quiet breathing.       Vascular:  positive pedal pulses bilaterally for both the DP and PT arteries.  CFT < 3 sec.  negative ankle edema.  positive pedal hair growth.    Neuro:  Alert and oriented x 3. Coordinated gait.  Light touch sensation is intact to the L4, L5, S1 distributions. No obvious deficits.  No evidence of neurological-based weakness, spasticity, or contracture in the lower extremities.      Derm: Normal texture and turgor.  No erythema, ecchymosis, or cyanosis.      Musculoskeletal:    Lower extremity muscle strength is normal.  Patient is ambulatory without an assistive device or brace.   Pronated arch with weightbearing.  No forefoot or rear foot deformities noted.  Normal ROM all fore foot and rearfoot joints.  No equinus.    No pain with stressing any muscle compartments.  No erythema edema or ecchymosis or masses noted.  Pain over left fourth and fifth tarsal metatarsal joint and to a lesser extent sinus tarsi.  No pain on the styloid process.  No pain with range of motion.  No pain with stressing any tendons.    Radiographic Exam:  X-Ray Findings:  I personally re unremarkable viewed the films.      A:  Pronation with left fourth and fifth tarsometatarsal joint capsulitis and sinus tarsi syndrome.    P:  X-rays taken today left foot.  Discussed the cause of this with the patient and how this relates to flat foot.   Patient will get new stiff supportive shoes and I made recommendations.  When  she gets these she will wear them at all times until her pain resolves.  If she needs more support for her foot I made suggestions on over-the-counter orthotics.  We also discussed custom orthotics.  If she decides she would like a pair she will call me and we will mail her a Rx for orthotics.  Discussed importance of wearing these in a good shoe at all times until this resolves.  Discussed other treatment options if not resolving.  RETURN TO CLINIC PRN.  Thank you for allowing me participate in the care of this patient.        Claus Norris DPM DPM, FACFAS      Again, thank you for allowing me to participate in the care of your patient.        Sincerely,        Claus Norris DPM

## 2019-09-11 ENCOUNTER — OFFICE VISIT (OUTPATIENT)
Dept: URGENT CARE | Facility: URGENT CARE | Age: 24
End: 2019-09-11
Payer: COMMERCIAL

## 2019-09-11 VITALS
HEART RATE: 88 BPM | WEIGHT: 265 LBS | OXYGEN SATURATION: 99 % | DIASTOLIC BLOOD PRESSURE: 81 MMHG | BODY MASS INDEX: 44.44 KG/M2 | TEMPERATURE: 98.1 F | SYSTOLIC BLOOD PRESSURE: 122 MMHG

## 2019-09-11 DIAGNOSIS — T78.3XXA ANGIOEDEMA, INITIAL ENCOUNTER: ICD-10-CM

## 2019-09-11 DIAGNOSIS — L50.9 HIVES: Primary | ICD-10-CM

## 2019-09-11 PROCEDURE — 99214 OFFICE O/P EST MOD 30 MIN: CPT | Performed by: PREVENTIVE MEDICINE

## 2019-09-11 RX ORDER — PREDNISONE 10 MG/1
TABLET ORAL
Qty: 13 TABLET | Refills: 0 | Status: SHIPPED | OUTPATIENT
Start: 2019-09-11 | End: 2019-09-17

## 2019-09-11 RX ORDER — PREDNISONE 20 MG/1
TABLET ORAL
Qty: 20 TABLET | Refills: 0 | Status: SHIPPED | OUTPATIENT
Start: 2019-09-11 | End: 2019-09-11

## 2019-09-11 RX ORDER — EPINEPHRINE 0.3 MG/.3ML
0.3 INJECTION SUBCUTANEOUS PRN
Qty: 2 EACH | Refills: 1 | Status: SHIPPED | OUTPATIENT
Start: 2019-09-11 | End: 2023-01-17

## 2019-09-11 NOTE — PATIENT INSTRUCTIONS
Cetirizine 10 mg daily  Ranitidine 150 mg daily  Prednisone taper  Benadryl 25 mg at bedtime.  Patient Education     Hives (Adult)  Hives are pink or red bumps on the skin. These bumps are also known as wheals. The bumps can itch, burn, or sting. Hives can occur anywhere on the body. They vary in size and shape and can form in clusters. Individual hives can appear and go away quickly. New hives may develop as old ones fade. Hives are common and usually harmless. Occasionally hives are a sign of a serious allergy.  Hives are often caused by an allergic reaction. It may be an allergic reaction to foods such as fruit, shellfish, chocolate, nuts, or tomatoes. It may be a reaction to pollens, animal fur, or mold spores. Medicines, chemicals, and insect bites can also cause hives. And hives can be caused by hot sun or cold air. The cause of hives can be difficult to find.  You may be given medicines to relieve swelling and itching. Follow all instructions when using these medicines. The hives will usually fade in a few days, but can last up to 2 weeks.  Home care  Follow these tips:    Try to find the cause of the hives and eliminate it. Discuss possible causes with your healthcare provider. Future reactions to the same allergen may be worse.    Don t scratch the hives. Scratching will delay healing. To reduce itching, apply cool, wet compresses to the skin.    Dress in soft, loose cotton clothing.    Don t bathe in hot water. This can make the itching worse.    Apply an ice pack or cool pack wrapped in a thin towel to your skin. This will help reduce redness and itching. But if your hives were caused by exposure to cold, then do not apply more cold to them.    You may use over-the counter antihistamines to reduce itching. Some older antihistamines, such as diphenhydramine and chlorpheniramine, are inexpensive. But they need to be taken often and may make you sleepy. They are best used at bedtime. Don t use  diphenhydramine if you have glaucoma or have trouble urinating because of an enlarged prostate. Newer antihistamines, such as loratadine, cetirizine, and fexofenadine, are generally more expensive. But they tend to have fewer side effects, such as drowsiness. They can be taken less often.    Another type of antihistamine is used to treat heartburn. This type includes ranitidine, nizatidine, famotidine, and cimetidine. These are sometimes used along with the above antihistamines if a single medicine is not working.  Follow-up care  Follow up with your healthcare provider if your symptoms don't get better in 2 days. Ask your provider about allergy testing if you have had a severe reaction, or have had several episodes of hives. He or she can use the allergy testing to find out what you are allergic to.  When to seek medical advice  Call your healthcare provider right away if any of these occur:    Fever of 100.4 F (38.0 C) or higher, or as directed by your healthcare provider    Redness, swelling, or pain    Foul-smelling fluid coming from the rash  Call 911  Call 911 if any of the following occur:    Swelling of the face, throat, or tongue    Trouble breathing or swallowing    Dizziness, weakness, or fainting  Date Last Reviewed: 9/1/2016 2000-2018 [employer]. [address] [place] [address], [place] [address]. All rights reserved. This information is not intended as a substitute for professional medical care. Always follow your healthcare professional's instructions.

## 2019-09-11 NOTE — PROGRESS NOTES
SUBJECTIVE:  Juanita Gonzalez is a 23 year old female who presents to the clinic today for a rash.  Onset of rash was 3 week(s) ago.   Rash is intermittent episodes lasting  2  hour(s).  Location of the rash: arm, lower, arm, upper, chest and lower leg.  Quality/symptoms of rash: itching   Symptoms are mild and rash seems to be not changing over the course of time.  Previous history of a similar rash? No  Recent exposure history: none known    Associated symptoms include: tongue/lip swelling.    Past Medical History:   Diagnosis Date     Allergic conjunctivitis      Anisometropia      Asthma      Attention deficit disorder with hyperactivity(314.01)     stopped medication 1 year ago     Myopia      Nonorganic enuresis     voiding dysfunction      Obesity, unspecified      OCD (obsessive compulsive disorder)      Wears contact lenses      Current Outpatient Medications   Medication Sig Dispense Refill     albuterol (PROAIR HFA) 108 (90 Base) MCG/ACT inhaler Inhale 2 puffs into the lungs every 6 hours 1 Inhaler 0     amphetamine-dextroamphetamine (ADDERALL) 5 MG tablet Take 1 tablet (5 mg) by mouth 2 times daily Must be 28 days between prescriptions. 30 tablet 0     amphetamine-dextroamphetamine (ADDERALL) 5 MG tablet Take 1 tablet (5 mg) by mouth 2 times daily Must be 28 days between prescriptions. 30 tablet 0     EPINEPHrine (EPIPEN/ADRENACLICK/OR ANY BX GENERIC EQUIV) 0.3 MG/0.3ML injection 2-pack Inject 0.3 mLs (0.3 mg) into the muscle as needed for anaphylaxis 2 each 1     montelukast (SINGULAIR) 10 MG tablet Take 1 tablet (10 mg) by mouth At Bedtime 90 tablet 3     predniSONE (DELTASONE) 10 MG tablet Take 3 tabs by mouth daily x 2 days, then 2 tabs daily x 2 days, then 1 tab daily x 2 days, then 1/2 tab daily x 1 days. 13 tablet 0     tiZANidine (ZANAFLEX) 2 MG tablet Take 1-2 tablets (2-4 mg) by mouth 3 times daily as needed for muscle spasms 30 tablet 1     Social History     Tobacco Use     Smoking  status: Never Smoker     Smokeless tobacco: Never Used   Substance Use Topics     Alcohol use: Yes     Comment: Occasional with friends.       ROS:  CONSTITUTIONAL:NEGATIVE for fever, chills, change in weight  INTEGUMENTARY/SKIN: NEGATIVE for worrisome rashes, moles or lesions  EYES: NEGATIVE for vision changes or irritation  ENT/MOUTH: NEGATIVE for ear, mouth and throat problems  RESP:NEGATIVE for significant cough or SOB  CV: NEGATIVE for chest pain, palpitations or peripheral edema  GI: NEGATIVE for nausea, abdominal pain, heartburn, or change in bowel habits    EXAM:   /81   Pulse 88   Temp 98.1  F (36.7  C) (Oral)   Wt 120.2 kg (265 lb)   LMP 08/21/2019   SpO2 99%   Breastfeeding? No   BMI 44.44 kg/m    GENERAL: alert, no acute distress.  SKIN: Rash description:    Distribution: not present currently  Location    GENERAL APPEARANCE: healthy, alert and no distress  EYES: EOMI,  PERRL, conjunctiva clear  NECK: supple, non-tender to palpation, no adenopathy noted  RESP: lungs clear to auscultation - no rales, rhonchi or wheezes  CV: regular rates and rhythm, normal S1 S2, no murmur noted    ASSESSMENT:  Hives  Angioedem    Pt given prednisone taper - 30 mg -->0 mg over 7 days  Cetirizine 10 mg daily  Benadryl 25 mg at bedtime  Ranitidine 150 mg daily  Epipen prescribed as well.  Pt advised to see allergist for further evaluation - referral given.      PLAN:  1) See today's orders.  2) Follow-up with primary clinic if not improving    25 minutes spent with patient, over 50% time counseling, coordinating care and explaining about nature of the patient's conditions.  All risks, benefits of treatment and further evaluation was reviewed with patient.  Pt expressed understanding.  Morris Chavez MD       none

## 2019-09-17 ENCOUNTER — OFFICE VISIT (OUTPATIENT)
Dept: FAMILY MEDICINE | Facility: CLINIC | Age: 24
End: 2019-09-17
Payer: COMMERCIAL

## 2019-09-17 VITALS
DIASTOLIC BLOOD PRESSURE: 66 MMHG | SYSTOLIC BLOOD PRESSURE: 112 MMHG | HEART RATE: 102 BPM | WEIGHT: 264 LBS | TEMPERATURE: 98.1 F | RESPIRATION RATE: 18 BRPM | OXYGEN SATURATION: 98 % | BODY MASS INDEX: 43.99 KG/M2 | HEIGHT: 65 IN

## 2019-09-17 DIAGNOSIS — F90.0 ATTENTION DEFICIT HYPERACTIVITY DISORDER (ADHD), PREDOMINANTLY INATTENTIVE TYPE: ICD-10-CM

## 2019-09-17 DIAGNOSIS — L50.9 URTICARIA: Primary | ICD-10-CM

## 2019-09-17 DIAGNOSIS — J45.20 MILD INTERMITTENT ASTHMA WITHOUT COMPLICATION: ICD-10-CM

## 2019-09-17 DIAGNOSIS — T78.3XXD ANGIOEDEMA, SUBSEQUENT ENCOUNTER: ICD-10-CM

## 2019-09-17 DIAGNOSIS — F40.10 SOCIAL ANXIETY DISORDER: ICD-10-CM

## 2019-09-17 LAB
ALBUMIN UR-MCNC: NEGATIVE MG/DL
APPEARANCE UR: CLEAR
BASOPHILS # BLD AUTO: 0 10E9/L (ref 0–0.2)
BASOPHILS NFR BLD AUTO: 0.2 %
BILIRUB UR QL STRIP: NEGATIVE
COLOR UR AUTO: YELLOW
DIFFERENTIAL METHOD BLD: ABNORMAL
EOSINOPHIL # BLD AUTO: 0.2 10E9/L (ref 0–0.7)
EOSINOPHIL NFR BLD AUTO: 1.5 %
ERYTHROCYTE [DISTWIDTH] IN BLOOD BY AUTOMATED COUNT: 16.3 % (ref 10–15)
ERYTHROCYTE [SEDIMENTATION RATE] IN BLOOD BY WESTERGREN METHOD: 9 MM/H (ref 0–20)
GLUCOSE UR STRIP-MCNC: NEGATIVE MG/DL
HCT VFR BLD AUTO: 38.7 % (ref 35–47)
HGB BLD-MCNC: 12.3 G/DL (ref 11.7–15.7)
HGB UR QL STRIP: NEGATIVE
KETONES UR STRIP-MCNC: NEGATIVE MG/DL
LEUKOCYTE ESTERASE UR QL STRIP: NEGATIVE
LYMPHOCYTES # BLD AUTO: 6.1 10E9/L (ref 0.8–5.3)
LYMPHOCYTES NFR BLD AUTO: 39.2 %
MCH RBC QN AUTO: 25.3 PG (ref 26.5–33)
MCHC RBC AUTO-ENTMCNC: 31.8 G/DL (ref 31.5–36.5)
MCV RBC AUTO: 80 FL (ref 78–100)
MONOCYTES # BLD AUTO: 1 10E9/L (ref 0–1.3)
MONOCYTES NFR BLD AUTO: 6.4 %
NEUTROPHILS # BLD AUTO: 8.2 10E9/L (ref 1.6–8.3)
NEUTROPHILS NFR BLD AUTO: 52.7 %
NITRATE UR QL: NEGATIVE
PH UR STRIP: 5.5 PH (ref 5–7)
PLATELET # BLD AUTO: 338 10E9/L (ref 150–450)
RBC # BLD AUTO: 4.87 10E12/L (ref 3.8–5.2)
SOURCE: NORMAL
SP GR UR STRIP: 1.01 (ref 1–1.03)
UROBILINOGEN UR STRIP-ACNC: 0.2 EU/DL (ref 0.2–1)
WBC # BLD AUTO: 15.6 10E9/L (ref 4–11)

## 2019-09-17 PROCEDURE — 81003 URINALYSIS AUTO W/O SCOPE: CPT | Performed by: NURSE PRACTITIONER

## 2019-09-17 PROCEDURE — 36415 COLL VENOUS BLD VENIPUNCTURE: CPT | Performed by: NURSE PRACTITIONER

## 2019-09-17 PROCEDURE — 80053 COMPREHEN METABOLIC PANEL: CPT | Performed by: NURSE PRACTITIONER

## 2019-09-17 PROCEDURE — 85025 COMPLETE CBC W/AUTO DIFF WBC: CPT | Performed by: NURSE PRACTITIONER

## 2019-09-17 PROCEDURE — 85652 RBC SED RATE AUTOMATED: CPT | Performed by: NURSE PRACTITIONER

## 2019-09-17 PROCEDURE — 99214 OFFICE O/P EST MOD 30 MIN: CPT | Performed by: NURSE PRACTITIONER

## 2019-09-17 RX ORDER — METHYLPHENIDATE HYDROCHLORIDE 18 MG/1
18 TABLET ORAL EVERY MORNING
Qty: 30 TABLET | Refills: 0 | Status: SHIPPED | OUTPATIENT
Start: 2019-09-17 | End: 2020-07-21

## 2019-09-17 ASSESSMENT — MIFFLIN-ST. JEOR: SCORE: 1959.41

## 2019-09-17 ASSESSMENT — PAIN SCALES - GENERAL: PAINLEVEL: NO PAIN (0)

## 2019-09-17 NOTE — PROGRESS NOTES
Subjective     Juanita Gonzalez is a 23 year old female who presents to clinic today for the following health issues:    HPI     Medication Followup of adderall 5 mg    Taking Medication as prescribed: NOT for last 7 days     Side Effects:  None    Medication Helping Symptoms:  NO     Patient has been on Concerta in the past and feels that after Adderall, it helped the most.  She feels she is sensitive to meds and would like to continue the lowest possible dose.    Patient notes that she was seen for angioedema, hives.  Symptoms started in early August.  Patient stopped Adderall and has not upper lip swelling in the past 7 days since stopping.  She notes hives when she goes home, as well as pruritis.  Patient denies any new pets, detergents, soaps at home.      Patient notes a history of social anxiety.  She does not think that stimulants worsen anxiety.  She notes that certain situations can make her uncomfortable.  She is not avoiding situations.    Patient notes some increased shortness of breath.  She is not using her singulair.  She notes symptoms with lying down.  She denies edema, chest pain, weight gain.  She is sometimes using her rescue inhaler for symptoms.        Patient Active Problem List   Diagnosis     Obesity     Attention deficit hyperactivity disorder (ADHD)     Disturbance in sleep behavior     Acanthosis nigricans     Acne     Seasonal allergies     Eczema     Vitamin D deficiency     Insulin resistance     Anisometropia     Dysmetabolic syndrome X     Myopia     Enchondroma of wrist or hand     Osteoma     Café au lait spot     Neck pain     Bilateral thoracic back pain     Morbid obesity (H)     Past Surgical History:   Procedure Laterality Date     DENTAL SURGERY       GRAFT BONE TO FINGER  3/18/2013    Procedure: GRAFT BONE TO FINGER;  Right Second Metacarpal Curettage and Allograft;  Surgeon: Jeremiah Yu MD;  Location:  OR      TOOTH EXTRACTION W/FORCEP       [facility]  ONE N/A 6/11/2015    Procedure: LE FORT ONE;  Surgeon: Vitaliy Yun DDS;  Location: [facility] OR     LE FORT ONE , OSTEOTOMY SAGITTAL SPLIT, COMBINED N/A 12/3/2014    Procedure: COMBINED LE FORT ONE, OSTEOTOMY SAGITTAL SPLIT;  Surgeon: Vitaliy Yun DDS;  Location: [facility] OR     TONGUE SURGERY  9/17/2014    tongue lesion removed       Social History     Tobacco Use     Smoking status: Never Smoker     Smokeless tobacco: Never Used   Substance Use Topics     Alcohol use: Yes     Comment: Occasional with friends.     Family History   Problem Relation Age of Onset     Asthma Mother      Obesity Father      Hypertension Maternal Grandmother      Cancer Maternal Grandfather      Hypertension Paternal Grandmother      Glaucoma Other         Mat Great Aunt/blindness     Glaucoma Maternal Aunt      Macular Degeneration No family hx of          Current Outpatient Medications   Medication Sig Dispense Refill     albuterol (PROAIR HFA) 108 (90 Base) MCG/ACT inhaler Inhale 2 puffs into the lungs every 6 hours 1 Inhaler 0     EPINEPHrine (EPIPEN/ADRENACLICK/OR ANY BX GENERIC EQUIV) 0.3 MG/0.3ML injection 2-pack Inject 0.3 mLs (0.3 mg) into the muscle as needed for anaphylaxis 2 each 1     methylphenidate HCl ER (CONCERTA) 18 MG CR tablet Take 1 tablet (18 mg) by mouth every morning 30 tablet 0     montelukast (SINGULAIR) 10 MG tablet Take 1 tablet (10 mg) by mouth At Bedtime (Patient not taking: Reported on 9/17/2019) 90 tablet 3     Allergies   Allergen Reactions     Adderall Angioedema     Bactrim Hives     BP Readings from Last 3 Encounters:   09/17/19 112/66   09/11/19 122/81   08/29/19 114/77    Wt Readings from Last 3 Encounters:   09/17/19 119.7 kg (264 lb)   09/11/19 120.2 kg (265 lb)   08/29/19 120.2 kg (265 lb)                    Reviewed and updated as needed this visit by Provider  Tobacco  Allergies  Meds  Problems  Med Hx  Surg Hx  Fam Hx         Review of Systems   ROS COMP: Constitutional, HEENT,  "cardiovascular, pulmonary, gi and gu systems are negative, except as otherwise noted.      Objective    /66   Pulse 102   Temp 98.1  F (36.7  C) (Oral)   Resp 18   Ht 1.645 m (5' 4.75\")   Wt 119.7 kg (264 lb)   LMP 08/21/2019 (Exact Date)   SpO2 98%   BMI 44.27 kg/m    Body mass index is 44.27 kg/m .  Physical Exam   GENERAL: healthy, alert and no distress  RESP: lungs clear to auscultation - no rales, rhonchi or wheezes  CV: regular rate and rhythm, normal S1 S2, no S3 or S4, no murmur, click or rub, no peripheral edema and peripheral pulses strong  MS: no gross musculoskeletal defects noted, no edema    Diagnostic Test Results:  In process        Assessment & Plan     1. Urticaria  Consider follow-up with Allergy if symptoms persist.  - CBC with platelets and differential  - Comprehensive metabolic panel (BMP + Alb, Alk Phos, ALT, AST, Total. Bili, TP)  - UA reflex to Microscopic and Culture    2. Angioedema, subsequent encounter  Patient to hold Adderall given that angioedema is a potential side effect.  - Erythrocyte sedimentation rate auto    3. Attention deficit hyperactivity disorder (ADHD), predominantly inattentive type  Patient to try Concerta.  - methylphenidate HCl ER (CONCERTA) 18 MG CR tablet; Take 1 tablet (18 mg) by mouth every morning  Dispense: 30 tablet; Refill: 0    4. Mild intermittent asthma without complication  Patient to resume singulair.    5. Social anxiety disorder  Patient declines counseling at this time.  I advised starting one medication at a time and will have her consider an serotonin specific reuptake inhibitor in the future.             Return in about 4 weeks (around 10/15/2019) for Medication Recheck-Concerta.    PINKY Hines [facility] [place]    "

## 2019-09-18 LAB
ALBUMIN SERPL-MCNC: 3.8 G/DL (ref 3.4–5)
ALP SERPL-CCNC: 85 U/L (ref 40–150)
ALT SERPL W P-5'-P-CCNC: 26 U/L (ref 0–50)
ANION GAP SERPL CALCULATED.3IONS-SCNC: 8 MMOL/L (ref 3–14)
AST SERPL W P-5'-P-CCNC: 17 U/L (ref 0–45)
BILIRUB SERPL-MCNC: 0.2 MG/DL (ref 0.2–1.3)
BUN SERPL-MCNC: 10 MG/DL (ref 7–30)
CALCIUM SERPL-MCNC: 8.9 MG/DL (ref 8.5–10.1)
CHLORIDE SERPL-SCNC: 104 MMOL/L (ref 94–109)
CO2 SERPL-SCNC: 26 MMOL/L (ref 20–32)
CREAT SERPL-MCNC: 0.75 MG/DL (ref 0.52–1.04)
GFR SERPL CREATININE-BSD FRML MDRD: >90 ML/MIN/{1.73_M2}
GLUCOSE SERPL-MCNC: 76 MG/DL (ref 70–99)
POTASSIUM SERPL-SCNC: 3.9 MMOL/L (ref 3.4–5.3)
PROT SERPL-MCNC: 7.5 G/DL (ref 6.8–8.8)
SODIUM SERPL-SCNC: 138 MMOL/L (ref 133–144)

## 2019-10-02 ENCOUNTER — HEALTH MAINTENANCE LETTER (OUTPATIENT)
Age: 24
End: 2019-10-02

## 2019-10-20 ENCOUNTER — TRANSFERRED RECORDS (OUTPATIENT)
Dept: HEALTH INFORMATION MANAGEMENT | Facility: CLINIC | Age: 24
End: 2019-10-20

## 2019-10-30 ENCOUNTER — OFFICE VISIT (OUTPATIENT)
Dept: OBGYN | Facility: CLINIC | Age: 24
End: 2019-10-30
Payer: COMMERCIAL

## 2019-10-30 ENCOUNTER — OFFICE VISIT (OUTPATIENT)
Dept: ALLERGY | Facility: CLINIC | Age: 24
End: 2019-10-30
Payer: COMMERCIAL

## 2019-10-30 VITALS
BODY MASS INDEX: 44.47 KG/M2 | WEIGHT: 265.2 LBS | TEMPERATURE: 97.8 F | DIASTOLIC BLOOD PRESSURE: 68 MMHG | OXYGEN SATURATION: 96 % | SYSTOLIC BLOOD PRESSURE: 122 MMHG | HEART RATE: 97 BPM

## 2019-10-30 VITALS
SYSTOLIC BLOOD PRESSURE: 129 MMHG | WEIGHT: 265 LBS | HEART RATE: 112 BPM | BODY MASS INDEX: 44.44 KG/M2 | OXYGEN SATURATION: 94 % | DIASTOLIC BLOOD PRESSURE: 84 MMHG

## 2019-10-30 DIAGNOSIS — N94.6 DYSMENORRHEA: ICD-10-CM

## 2019-10-30 DIAGNOSIS — T78.3XXA ANGIOEDEMA, INITIAL ENCOUNTER: ICD-10-CM

## 2019-10-30 DIAGNOSIS — Z30.09: Primary | ICD-10-CM

## 2019-10-30 DIAGNOSIS — L50.8 CHRONIC URTICARIA: Primary | ICD-10-CM

## 2019-10-30 DIAGNOSIS — Z11.3 SCREEN FOR STD (SEXUALLY TRANSMITTED DISEASE): ICD-10-CM

## 2019-10-30 DIAGNOSIS — J45.20 MILD INTERMITTENT ASTHMA WITHOUT COMPLICATION: ICD-10-CM

## 2019-10-30 DIAGNOSIS — N92.0 MENORRHAGIA WITH REGULAR CYCLE: ICD-10-CM

## 2019-10-30 LAB
FEF 25/75: NORMAL
FEV-1: NORMAL
FEV1/FVC: NORMAL
FVC: NORMAL
TSH SERPL DL<=0.005 MIU/L-ACNC: 0.88 MU/L (ref 0.4–4)

## 2019-10-30 PROCEDURE — 87491 CHLMYD TRACH DNA AMP PROBE: CPT | Performed by: OBSTETRICS & GYNECOLOGY

## 2019-10-30 PROCEDURE — 95004 PERQ TESTS W/ALRGNC XTRCS: CPT | Performed by: ALLERGY & IMMUNOLOGY

## 2019-10-30 PROCEDURE — 99204 OFFICE O/P NEW MOD 45 MIN: CPT | Performed by: OBSTETRICS & GYNECOLOGY

## 2019-10-30 PROCEDURE — 87389 HIV-1 AG W/HIV-1&-2 AB AG IA: CPT | Performed by: OBSTETRICS & GYNECOLOGY

## 2019-10-30 PROCEDURE — 99244 OFF/OP CNSLTJ NEW/EST MOD 40: CPT | Mod: 25 | Performed by: ALLERGY & IMMUNOLOGY

## 2019-10-30 PROCEDURE — 86160 COMPLEMENT ANTIGEN: CPT | Performed by: ALLERGY & IMMUNOLOGY

## 2019-10-30 PROCEDURE — 84443 ASSAY THYROID STIM HORMONE: CPT | Performed by: ALLERGY & IMMUNOLOGY

## 2019-10-30 PROCEDURE — 36415 COLL VENOUS BLD VENIPUNCTURE: CPT | Performed by: OBSTETRICS & GYNECOLOGY

## 2019-10-30 PROCEDURE — 94010 BREATHING CAPACITY TEST: CPT | Performed by: ALLERGY & IMMUNOLOGY

## 2019-10-30 PROCEDURE — 99000 SPECIMEN HANDLING OFFICE-LAB: CPT | Performed by: ALLERGY & IMMUNOLOGY

## 2019-10-30 PROCEDURE — 86780 TREPONEMA PALLIDUM: CPT | Performed by: OBSTETRICS & GYNECOLOGY

## 2019-10-30 PROCEDURE — 83520 IMMUNOASSAY QUANT NOS NONAB: CPT | Performed by: ALLERGY & IMMUNOLOGY

## 2019-10-30 PROCEDURE — 86160 COMPLEMENT ANTIGEN: CPT | Mod: 90 | Performed by: ALLERGY & IMMUNOLOGY

## 2019-10-30 PROCEDURE — G0499 HEPB SCREEN HIGH RISK INDIV: HCPCS | Performed by: OBSTETRICS & GYNECOLOGY

## 2019-10-30 PROCEDURE — 86161 COMPLEMENT/FUNCTION ACTIVITY: CPT | Mod: 90 | Performed by: ALLERGY & IMMUNOLOGY

## 2019-10-30 PROCEDURE — 87591 N.GONORRHOEAE DNA AMP PROB: CPT | Performed by: OBSTETRICS & GYNECOLOGY

## 2019-10-30 RX ORDER — LORATADINE 10 MG/1
10 TABLET ORAL EVERY MORNING
Qty: 30 TABLET | Refills: 5 | Status: SHIPPED | OUTPATIENT
Start: 2019-10-30 | End: 2021-12-10

## 2019-10-30 RX ORDER — FAMOTIDINE 20 MG/1
20 TABLET, FILM COATED ORAL 2 TIMES DAILY
Qty: 60 TABLET | Refills: 5 | Status: SHIPPED | OUTPATIENT
Start: 2019-10-30 | End: 2020-07-21

## 2019-10-30 RX ORDER — CETIRIZINE HYDROCHLORIDE 10 MG/1
10 TABLET ORAL AT BEDTIME
Qty: 30 TABLET | Refills: 5 | Status: SHIPPED | OUTPATIENT
Start: 2019-10-30 | End: 2020-07-21

## 2019-10-30 RX ORDER — ALBUTEROL SULFATE 90 UG/1
2 AEROSOL, METERED RESPIRATORY (INHALATION) EVERY 4 HOURS PRN
Qty: 1 INHALER | Refills: 1 | Status: SHIPPED | OUTPATIENT
Start: 2019-10-30 | End: 2020-11-14

## 2019-10-30 NOTE — PROGRESS NOTES
Juanita is a 24 year old female  who presents today for consultation regarding contraceptive options.  She has been on the OCPs but she stopped them a couple of months ago because she did not have insurance and did not get the refills when she did have the insurance.    Together, we reviewed the contraceptive options available.  We reviewed the success rates and the pregnancy rates of the options.  These include but are not limited to the male and female sterilization procedures, barrier methods and spermicides. Hormonal methods were reviewed: Nexplanon, Mirena IUD (as well as the ParaGard IUD, although not hormonal), Injections, Rings, Oral contraceptives. Natural family planning also discussed.  We reviewed the R/B/A for abstinence, OCP, Patch, Nuva Ring, Nexplanon, Depo-Provera, IUD, condoms, and permanent sterilization.  I reviewed with her, the potential side effects of hormonal contraception including but not limited to thromboembolic events, hypertension, breakthrough bleeding, GI upset, and headaches.  Proper usage was also reviewed. We also reviewed need for back-up contraception for the first month of hormonal methods.   We also reviewed non-hormonal contraception and the goals and limitations.  Barrier methods were reviewed, and the need for the use of spermicides with the barrier methods.    Reviewed that only abstinence and condoms provide protection from STD's.     She is interested in STD testing.  Occasionally, she will have unprotected sex.  Condoms advised.  She desires to have the more common STD testing.     She has had heavy menses and cramping.  She is desiring an option that will help manage these.     Questions seemed to be answered.      Past Medical History:   Diagnosis Date     Allergic conjunctivitis      Anisometropia      Asthma      Attention deficit disorder with hyperactivity(314.01)     stopped medication 1 year ago     Myopia      Nonorganic enuresis     voiding dysfunction       Obesity, unspecified      OCD (obsessive compulsive disorder)      Wears contact lenses      Past Surgical History:   Procedure Laterality Date     DENTAL SURGERY       GRAFT BONE TO FINGER  3/18/2013    Procedure: GRAFT BONE TO FINGER;  Right Second Metacarpal Curettage and Allograft;  Surgeon: Jeremiah Yu MD;  Location: [facility] OR     HC TOOTH EXTRACTION W/FORCEP       LE FORT ONE N/A 2015    Procedure: LE FORT ONE;  Surgeon: Vitaliy Yun DDS;  Location: [facility] OR     LE FORT ONE , OSTEOTOMY SAGITTAL SPLIT, COMBINED N/A 12/3/2014    Procedure: COMBINED LE FORT ONE, OSTEOTOMY SAGITTAL SPLIT;  Surgeon: Vitaliy Yun DDS;  Location: [facility] OR     TONGUE SURGERY  2014    tongue lesion removed     OB History    Para Term  AB Living   0 0 0 0 0 0   SAB TAB Ectopic Multiple Live Births   0 0 0 0 0        Allergies   Allergen Reactions     Adderall Angioedema     Bactrim Hives     Current Outpatient Medications   Medication Sig Dispense Refill     albuterol (PROAIR HFA/PROVENTIL HFA/VENTOLIN HFA) 108 (90 Base) MCG/ACT inhaler Inhale 2 puffs into the lungs every 4 hours as needed for shortness of breath / dyspnea or wheezing 1 Inhaler 1     cetirizine (ZYRTEC) 10 MG tablet Take 1 tablet (10 mg) by mouth At Bedtime 30 tablet 5     EPINEPHrine (EPIPEN/ADRENACLICK/OR ANY BX GENERIC EQUIV) 0.3 MG/0.3ML injection 2-pack Inject 0.3 mLs (0.3 mg) into the muscle as needed for anaphylaxis 2 each 1     famotidine (PEPCID) 20 MG tablet Take 1 tablet (20 mg) by mouth 2 times daily 60 tablet 5     loratadine (CLARITIN) 10 MG tablet Take 1 tablet (10 mg) by mouth every morning 30 tablet 5     methylphenidate HCl ER (CONCERTA) 18 MG CR tablet Take 1 tablet (18 mg) by mouth every morning 30 tablet 0     montelukast (SINGULAIR) 10 MG tablet Take 1 tablet (10 mg) by mouth At Bedtime 90 tablet 3     Social History     Socioeconomic History     Marital status: Single     Spouse name: Not on file     Number of  children: Not on file     Years of education: Not on file     Highest education level: Not on file   Occupational History     Not on file   Social Needs     Financial resource strain: Not on file     Food insecurity:     Worry: Not on file     Inability: Not on file     Transportation needs:     Medical: Not on file     Non-medical: Not on file   Tobacco Use     Smoking status: Never Smoker     Smokeless tobacco: Never Used   Substance and Sexual Activity     Alcohol use: Yes     Comment: Occasional with friends.     Drug use: Yes     Types: Marijuana     Comment: occas marijuana     Sexual activity: Yes     Partners: Male     Birth control/protection: Condom     Comment: GC/ chlamydia sent 12/21/16.   Lifestyle     Physical activity:     Days per week: Not on file     Minutes per session: Not on file     Stress: Not on file   Relationships     Social connections:     Talks on phone: Not on file     Gets together: Not on file     Attends Anglican service: Not on file     Active member of club or organization: Not on file     Attends meetings of clubs or organizations: Not on file     Relationship status: Not on file     Intimate partner violence:     Fear of current or ex partner: Not on file     Emotionally abused: Not on file     Physically abused: Not on file     Forced sexual activity: Not on file   Other Topics Concern     Parent/sibling w/ CABG, MI or angioplasty before 65F 55M? Not Asked   Social History Narrative    Lives with her mom. In college via [employer], technically a senior in high school     Family History   Problem Relation Age of Onset     Asthma Mother      Obesity Father      Hypertension Maternal Grandmother      Cancer Maternal Grandfather      Hypertension Paternal Grandmother      Glaucoma Other         Mat Great Aunt/blindness     Glaucoma Maternal Aunt      Macular Degeneration No family hx of        Review of Systems:  10 point ROS of systems including Constitutional, Eyes, Respiratory,  Cardiovascular, Gastroenterology, Genitourinary, Integumentary, Muscularskeletal, Psychiatric were all negative except for pertinent positives noted in my HPI and in the PMH.      EXAM:  /84 (BP Location: Right arm, Cuff Size: Adult Large)   Pulse 112   Wt 120.2 kg (265 lb)   LMP 10/21/2019 (Exact Date)   SpO2 94%   BMI 44.44 kg/m    Body mass index is 44.44 kg/m .  General:  WNWD female, NAD  Alert  Oriented x 3  Gait:  Normal  Skin:  Normal skin turgor  HEENT:  NC/AT, EOMI  Neck:  Symmetrical, no masses  Lungs:  Good respiratory effort   Abdomen:  Non-tender, non-distended.  Pelvic exam:  Not performed   Extremities:  No clubbing, cyanosis or edema      ASSESSMENT:  Contraceptive management/Family Planning.  STD screen  Dysmenorrhea  Menorrhagia      PLAN:  Safer sex discussed  Contraception discussed  STD testing is ordered.  Incubation times discussed.  Possible retesting discussed.   After the discussion, she is desiring to proceed with the Mirena IUD.  She is concerned about the weight gain with the systemic options.  She desires to have an option that might help control the bleeding and the cramping.      Questions seem to be answered.       TT  45 min face to face.   CT  Greater than 80%    Justen Gallegos MD

## 2019-10-30 NOTE — PATIENT INSTRUCTIONS
If you have any questions regarding your allergies, asthma, or what we discussed during your visit today please call the allergy clinic or contact us via Subtext.    [facility] RN Line: [phone]  [facility] Scheduling Line: [phone]  [facility] Scheduling Line: [phone]      Daily medications for hives and swellin. Loratadine (Claritin) - Take 1 tablet every morning    2. Cetirizine (Zyrtec) - Take 1 tablet every evening    3. Famotidine (Pepcid) - Take 1 tablet twice daily    4. Montelukast (Singulair) - Take 1 tablet daily      Follow-up in 2-3 months      ENVIRONMENTAL PERCUTANEOUS SKIN TESTING: ADULT  [place] [facility] 10/30/2019   Consent Y   Ordering Physician Dr. Cox   Interpreting Physician Dr. Cox   Testing Technician  Ilana MEHTA RN   Location Back   Time start: 11:34 AM   Time End: 11:50 AM   Positive Control: Histatrol*ALK 1 mg/ml 7x14   Negative Control: 50% Glycerin 0   Cat Hair*ALK (10,000 BAU/ml) 0   AP Dog Hair/Dander (1:100 w/v) 0   Dust Mite p. 30,000 AU/ml 0   Dust Mite f. (30,000 AU/ml) 0   Scott (W/F in millimeters) 0   Anson Grass (100,000 BAU/mL) 0   Red Cedar (W/F in millimeters) 0   Maple/[place] (W/F in millimeters) 0   Hackberry (W/F in millimeters) 0   Norman (W/F in millimeters) 0   Meigs *ALK (W/F in millimeters) 0   American Elm (W/F in millimeters) 0   [place] (W/F in millimeters) 5x8   Black [place] (W/F in millimeters) 5x8   Birch Mix (W/F in millimeters) 0   Candler (W/F in millimeters) 0   Oak (W/F in millimeters) 0   Cocklebur (W/F in millimeters) 0   [place] (W/F in millimeters) 0   [place] (W/F in millimeters) 0   Careless (W/F in millimeters) 0   Nettle (W/F in millimeters) 0   English Plantain (W/F in millimeters) 0   Kochia (W/F in millimeters) 0   Lamb's Quarter (W/F in millimeters) 0   Marshelder (W/F in millimeters) 0   Ragweed Mix* ALK (W/F in millimeters) 0   Russian Thistle (W/F in  millimeters) 0   Sagebrush/Mugwort (W/F in millimeters) 0   Sheep Sorrel (W/F in millimeters) 0   Feather Mix* ALK (W/F in millimeters) 0   Penicillium Mix (1:10 w/v) 0   Curvularia spicifera (1:10 w/v) 0   Epicoccum (1:10 w/v) 0   Aspergillus fumigatus (1:10 w/v): 0   Alternaria tenius (1:10 w/v) 4x8   H. Cladosporium (1:10 w/v) 0   Phoma herbarum (1:10 w/v) 0

## 2019-10-30 NOTE — NURSING NOTE
Per provider verbal order, placed Adult Environmental Panel scratch test.  Consent was obtained prior to procedure.  Once panels were placed, patient was monitored for 15 minutes in clinic.  Provider read test after 15 minutes..  Pt tolerated procedure well.  All questions and concerns were addressed at office visit.     Ilana Sanchez RN on 10/30/2019 at 11:42 AM

## 2019-10-30 NOTE — PROGRESS NOTES
Dear Morris Chavez MD,    Thank you for referring your patient Juanita Gonzalez to the Allergy/Immunology Clinic. Juanita Gonzalez was seen in the Allergy Clinic at [facility]. The following are my recommendations regarding her Intermittent Asthma, Chronic Urticaria and Angioedema    1. Begin taking loratadine 20mg every morning  2. Increase cetirizine to 20mg every evening  3. Begin famotidine 20mg twice daily  4. Begin montelukast 10mg daily  5. Will check CBC, CMP, TSH, Tryptase, C4, C1 esterase inhibitor total and functional, and complement component C1q  6. Take 2 to 4 puffs of albuterol HFA every 4 hours as needed  7. Follow-up in 2 to 3 months, sooner if needed      Juanita Gonzalez is a 24 year old  female being seen today at the request of Dr. Charles in consultation for allergies. She reports that last July or August she began to have symptoms of facial swelling and hives. The symptoms occurred daily and would start when she would arrive home. At first she thought her symptoms were due to her new sheets and bedding so she switched it out however her symptoms persisted. She also changed her face wash and soap but the symptoms continued to persist. Juanita states that the hives persisted for a few weeks, resolved for 3 to 4 weeks, and have since returned. In addition to the hives she has also had episodes of lip swelling. The lip swelling has not always been accompanied by hives but is present always in the middle of her upper lip and has associated tingling. The swelling lasts no more than 24 hours and the individual hives do not persist for more than a few hours. She has been taking Benadryl and cetirizine and these help to relieve her symptoms. Juanita has been taking these medications only when she has symptoms and has not been taking them preventatively.    Last week Juanita went to the ED for symptoms of difficulty breathing and facial swelling.  She did not have any hives at the time. Last month she was seen in urgent care for similar symptoms and was treated with prednisone. She followed up with her PCP and she was switched from Adderall to Concerta due to concerns that her symptoms may have been triggered by her medications. She has continued to have symptoms despite making changes to her medication. For the past few days Juanita has continued to have difficulty breathing. At first she thought this may have been due to anxiety as her mother recently moved to [place] and she was anxious about this move and separation. She reports feeling as though someone was sitting on her chest and she began using her albuterol inhaler but it was not helping. Prior to going to the ED last week she went to urgent care again and due to her difficulty breathing and facial swelling epinephrine was administered and she was transferred to the ED. Juanita reports she was feeling better by the time she arrived in the ED.    Juanita was diagnosed with asthma as a young child. She has never previously been hospitalized or to the ED for asthma. Her asthma has improved with age and she has rarely needed to use albuterol. She denies having nocturnal asthma symptoms or limitations in her activity but she generally avoids strenuous activity. Her main asthma trigger is respiratory infection. In the fall she has also noted seasonal symptoms of rhinorrhea and occasionally has wheezing as well.      Past Medical History:   Diagnosis Date     Allergic conjunctivitis      Anisometropia      Asthma      Attention deficit disorder with hyperactivity(314.01)     stopped medication 1 year ago     Myopia      Nonorganic enuresis     voiding dysfunction      Obesity, unspecified      OCD (obsessive compulsive disorder)      Wears contact lenses      Family History   Problem Relation Age of Onset     Asthma Mother      Obesity Father      Hypertension Maternal Grandmother      Cancer Maternal  Grandfather      Hypertension Paternal Grandmother      Glaucoma Other         Mat Great Aunt/blindness     Glaucoma Maternal Aunt      Macular Degeneration No family hx of      Past Surgical History:   Procedure Laterality Date     DENTAL SURGERY       GRAFT BONE TO FINGER  3/18/2013    Procedure: GRAFT BONE TO FINGER;  Right Second Metacarpal Curettage and Allograft;  Surgeon: Jeremiah Yu MD;  Location: [facility] OR     HC TOOTH EXTRACTION W/FORCEP       LE FORT ONE N/A 6/11/2015    Procedure: LE FORT ONE;  Surgeon: Vitaliy Yun DDS;  Location: [facility] OR     LE FORT ONE , OSTEOTOMY SAGITTAL SPLIT, COMBINED N/A 12/3/2014    Procedure: COMBINED LE FORT ONE, OSTEOTOMY SAGITTAL SPLIT;  Surgeon: Vitaliy Yun DDS;  Location: [facility] OR     TONGUE SURGERY  9/17/2014    tongue lesion removed       ENVIRONMENTAL HISTORY: [place] The home is heated with a radiant heat. They does not have central air conditioning. The patient's bedroom is furnished with carpeting in bedroom.  Pets inside the house include None. There is no history of cockroach or mice infestation. There is/are 0 smokers in the house.  The house does not have a basement.     SOCIAL HISTORY:   Juanita is employed as a . She has missed 0 days of school/work. She lives alone.    REVIEW OF SYSTEMS:  General: negative for weight gain. negative for weight loss. negative for changes in sleep.   Eyes: negative for itching. negative for redness. negative for tearing/watering. negative for vision changes  Ears: negative for fullness. negative for hearing loss. negative for dizziness.   Nose: negative for snoring.negative for changes in smell. negative for drainage. Positive for congestion.  Throat: negative for hoarseness. negative for sore throat. negative for trouble swallowing.   Lungs: negative for cough. positive  for shortness of breath.positive  for wheezing. negative for sputum production.    Cardiovascular: negative for chest pain. negative for swelling of ankles. negative for fast or irregular heartbeat.   Gastrointestinal: negative for nausea. negative for heartburn. negative for acid reflux.   Musculoskeletal: negative for joint pain. negative for joint stiffness. negative for joint swelling.   Neurologic: negative for seizures. negative for fainting. negative for weakness.   Psychiatric: negative for changes in mood. negative for anxiety.   Endocrine: negative for cold intolerance. negative for heat intolerance. negative for tremors.   Hematologic: negative for easy bruising. negative for easy bleeding.  Integumentary: negative for rash. negative for scaling. negative for nail changes.       Current Outpatient Medications:      albuterol (PROAIR HFA) 108 (90 Base) MCG/ACT inhaler, Inhale 2 puffs into the lungs every 6 hours, Disp: 1 Inhaler, Rfl: 0     methylphenidate HCl ER (CONCERTA) 18 MG CR tablet, Take 1 tablet (18 mg) by mouth every morning, Disp: 30 tablet, Rfl: 0     montelukast (SINGULAIR) 10 MG tablet, Take 1 tablet (10 mg) by mouth At Bedtime, Disp: 90 tablet, Rfl: 3     EPINEPHrine (EPIPEN/ADRENACLICK/OR ANY BX GENERIC EQUIV) 0.3 MG/0.3ML injection 2-pack, Inject 0.3 mLs (0.3 mg) into the muscle as needed for anaphylaxis (Patient not taking: Reported on 10/30/2019), Disp: 2 each, Rfl: 1  Immunization History   Administered Date(s) Administered     DTAP (<7y) 1997, 01/28/1998, 03/30/1998, 01/20/1999, 10/23/2002     HEPA 10/30/2008, 06/01/2009     HPV 06/01/2009, 10/29/2009, 03/29/2010     HepB 1997, 1997, 06/29/1998     Hib (PRP-T) 1997, 01/28/1998, 03/30/1998, 01/20/1999     Influenza (H1N1) 01/19/2010     Influenza (IIV3) PF 10/14/1998, 11/18/1998, 12/04/2006, 10/10/2007, 10/30/2008, 10/29/2009, 12/01/2010, 10/18/2011, 10/17/2012     Influenza Vaccine IM > 6 months Valent IIV4 12/05/2013, 01/15/2015, 10/13/2015, 11/30/2016, 11/02/2017, 12/06/2018, 10/21/2019      MMR 11/18/1998, 10/23/2002     Mantoux Tuberculin Skin Test 02/27/2019     Meningococcal (Menactra ) 06/01/2009, 10/14/2014     Pneumo Conj 13-V (2010&after) 09/06/2012     Poliovirus, inactivated (IPV) 1997, 01/28/1998, 09/29/1999, 10/23/2002     TD (ADULT, 7+) 02/27/2019     TDAP Vaccine (Boostrix) 06/01/2009     Varicella 11/28/1998, 10/30/2008     Allergies   Allergen Reactions     Adderall Angioedema     Bactrim Hives         EXAM:   Pulse 97   Temp 97.8  F (36.6  C) (Oral)   Wt 120.3 kg (265 lb 3.2 oz)   SpO2 96%   BMI 44.47 kg/m    GENERAL APPEARANCE: alert, cooperative and not in distress  SKIN: no rashes, no lesions  HEAD: atraumatic, normocephalic  EYES: lids and lashes normal, conjunctivae and sclerae clear, pupils equal, round, reactive to light, EOM full and intact  ENT: no scars or lesions, nasal exam showed no discharge, swelling or lesions noted, otoscopy showed external auditory canals clear, tympanic membranes normal, tongue midline and normal, soft palate, uvula, and tonsils normal  NECK: no asymmetry, masses, or scars, supple without significant adenopathy  LUNGS: unlabored respirations, no intercostal retractions or accessory muscle use, clear to auscultation without rales or wheezes  HEART: regular rate and rhythm without murmurs and normal S1 and S2  MUSCULOSKELETAL: no musculoskeletal defects are noted  NEURO: no focal deficits noted  PSYCH: does not appear depressed or anxious    WORKUP: Spirometry  SPIROMETRY       FVC 3.84L (114% of predicted).     FEV1 3.14L (106% of predicted).     FEV1/FVC 82%      I have reviewed and interpreted these results. These values are consistent with normal lung function.    ENVIRONMENTAL PERCUTANEOUS SKIN TESTING: ADULT  [facility] 10/30/2019   Consent Y   Ordering Physician Dr. Cox   Interpreting Physician Dr. Cox   Testing Technician  Ilana MEHTA RN   Location Back   Time start: 11:34 AM   Time End: 11:50 AM   Positive  Control: Histatrol*ALK 1 mg/ml 7x14   Negative Control: 50% Glycerin 0   Cat Hair*ALK (10,000 BAU/ml) 0   AP Dog Hair/Dander (1:100 w/v) 0   Dust Mite p. 30,000 AU/ml 0   Dust Mite f. (30,000 AU/ml) 0   Scott (W/F in millimeters) 0   Anson Grass (100,000 BAU/mL) 0   Red Cedar (W/F in millimeters) 0   Maple/[place] (W/F in millimeters) 0   Hackberry (W/F in millimeters) 0   Dana (W/F in millimeters) 0   Burgin *ALK (W/F in millimeters) 0   American Elm (W/F in millimeters) 0   [place] (W/F in millimeters) 5x8   Black [place] (W/F in millimeters) 5x8   Birch Mix (W/F in millimeters) 0   [place] (W/F in millimeters) 0   Oak (W/F in millimeters) 0   Cocklebur (W/F in millimeters) 0   [place] (W/F in millimeters) 0   [place] (W/F in millimeters) 0   Careless (W/F in millimeters) 0   Nettle (W/F in millimeters) 0   English Plantain (W/F in millimeters) 0   Kochia (W/F in millimeters) 0   Lamb's Quarter (W/F in millimeters) 0   Marshelder (W/F in millimeters) 0   Ragweed Mix* ALK (W/F in millimeters) 0   Russian Thistle (W/F in millimeters) 0   Sagebrush/Mugwort (W/F in millimeters) 0   Sheep Sorrel (W/F in millimeters) 0   Feather Mix* ALK (W/F in millimeters) 0   Penicillium Mix (1:10 w/v) 0   Curvularia spicifera (1:10 w/v) 0   Epicoccum (1:10 w/v) 0   Aspergillus fumigatus (1:10 w/v): 0   Alternaria tenius (1:10 w/v) 4x8   H. Cladosporium (1:10 w/v) 0   Phoma herbarum (1:10 w/v) 0      Appropriate response to controls, positive to trees and Alternaria    ASSESSMENT/PLAN:  Juanita Gonzalez is a 24 year old female here for evaluation of recurrent hives and facial swelling. Her symptoms have been ongoing for more than 6 weeks and are consistent with chronic urticaria. She has had associated episodes of angioedema including a recent episode of angioedema and shortness of breath. Skin testing was positive for sensitization to trees and molds which is unlikely to account for her current symptoms.  Additionally we discussed that chronic urticaria is typically not caused by underlying food or environmental allergies.     1. Begin taking loratadine 20mg every morning  2. Increase cetirizine to 20mg every evening  3. Begin famotidine 20mg twice daily  4. Begin montelukast 10mg daily  5. Will check CBC, CMP, TSH, Tryptase, C4, C1 esterase inhibitor total and functional, and complement component C1q  6. Take 2 to 4 puffs of albuterol HFA every 4 hours as needed  7. Follow-up in 2 to 3 months, sooner if needed      Thank you for allowing me to participate in the care of Juanita Gonzalez.      Dax Cox MD  Allergy/Immunology  [place], [place]      Chart documentation done in part with Dragon Voice Recognition Software. Although reviewed after completion, some word and grammatical errors may remain.

## 2019-10-30 NOTE — LETTER
10/30/2019         RE: Juanita Gonzalez  [id number]  [facility] [address]        Dear Colleague,    Thank you for referring your patient, Juanita Gonzalez, to the [facility]. Please see a copy of my visit note below.    Dear Morris Chavez MD,    Thank you for referring your patient Juanita Gonzalez to the Allergy/Immunology Clinic. Juanita Gonzalez was seen in the Allergy Clinic at [facility]. The following are my recommendations regarding her Intermittent Asthma, Chronic Urticaria and Angioedema    1. Begin taking loratadine 20mg every morning  2. Increase cetirizine to 20mg every evening  3. Begin famotidine 20mg twice daily  4. Begin montelukast 10mg daily  5. Will check CBC, CMP, TSH, Tryptase, C4, C1 esterase inhibitor total and functional, and complement component C1q  6. Take 2 to 4 puffs of albuterol HFA every 4 hours as needed  7. Follow-up in 2 to 3 months, sooner if needed      Juanita Gonzalez is a 24 year old  female being seen today at the request of Dr. Charles in consultation for allergies. She reports that last July or August she began to have symptoms of facial swelling and hives. The symptoms occurred daily and would start when she would arrive home. At first she thought her symptoms were due to her new sheets and bedding so she switched it out however her symptoms persisted. She also changed her face wash and soap but the symptoms continued to persist. Juanita states that the hives persisted for a few weeks, resolved for 3 to 4 weeks, and have since returned. In addition to the hives she has also had episodes of lip swelling. The lip swelling has not always been accompanied by hives but is present always in the middle of her upper lip and has associated tingling. The swelling lasts no more than 24 hours and the individual hives do not persist for more than a few hours. She has been taking Benadryl and  cetirizine and these help to relieve her symptoms. Juanita has been taking these medications only when she has symptoms and has not been taking them preventatively.    Last week Juanita went to the ED for symptoms of difficulty breathing and facial swelling. She did not have any hives at the time. Last month she was seen in urgent care for similar symptoms and was treated with prednisone. She followed up with her PCP and she was switched from Adderall to Concerta due to concerns that her symptoms may have been triggered by her medications. She has continued to have symptoms despite making changes to her medication. For the past few days Juanita has continued to have difficulty breathing. At first she thought this may have been due to anxiety as her mother recently moved to [place] and she was anxious about this move and separation. She reports feeling as though someone was sitting on her chest and she began using her albuterol inhaler but it was not helping. Prior to going to the ED last week she went to urgent care again and due to her difficulty breathing and facial swelling epinephrine was administered and she was transferred to the ED. Juanita reports she was feeling better by the time she arrived in the ED.    Juanita was diagnosed with asthma as a young child. She has never previously been hospitalized or to the ED for asthma. Her asthma has improved with age and she has rarely needed to use albuterol. She denies having nocturnal asthma symptoms or limitations in her activity but she generally avoids strenuous activity. Her main asthma trigger is respiratory infection. In the fall she has also noted seasonal symptoms of rhinorrhea and occasionally has wheezing as well.      Past Medical History:   Diagnosis Date     Allergic conjunctivitis      Anisometropia      Asthma      Attention deficit disorder with hyperactivity(314.01)     stopped medication 1 year ago     Myopia      Nonorganic enuresis     voiding  dysfunction      Obesity, unspecified      OCD (obsessive compulsive disorder)      Wears contact lenses      Family History   Problem Relation Age of Onset     Asthma Mother      Obesity Father      Hypertension Maternal Grandmother      Cancer Maternal Grandfather      Hypertension Paternal Grandmother      Glaucoma Other         Mat Great Aunt/blindness     Glaucoma Maternal Aunt      Macular Degeneration No family hx of      Past Surgical History:   Procedure Laterality Date     DENTAL SURGERY       GRAFT BONE TO FINGER  3/18/2013    Procedure: GRAFT BONE TO FINGER;  Right Second Metacarpal Curettage and Allograft;  Surgeon: Jeremiah Yu MD;  Location: [facility] OR     HC TOOTH EXTRACTION W/FORCEP       LE FORT ONE N/A 6/11/2015    Procedure: LE FORT ONE;  Surgeon: Vitaliy Yun DDS;  Location: [facility] OR     LE FORT ONE , OSTEOTOMY SAGITTAL SPLIT, COMBINED N/A 12/3/2014    Procedure: COMBINED LE FORT ONE, OSTEOTOMY SAGITTAL SPLIT;  Surgeon: Vitaliy Yun DDS;  Location: [facility] OR     TONGUE SURGERY  9/17/2014    tongue lesion removed       ENVIRONMENTAL HISTORY: [place] The home is heated with a radiant heat. They does not have central air conditioning. The patient's bedroom is furnished with carpeting in bedroom.  Pets inside the house include None. There is no history of cockroach or mice infestation. There is/are 0 smokers in the house.  The house does not have a basement.     SOCIAL HISTORY:   Juanita is employed as a . She has missed 0 days of school/work. She lives alone.    REVIEW OF SYSTEMS:  General: negative for weight gain. negative for weight loss. negative for changes in sleep.   Eyes: negative for itching. negative for redness. negative for tearing/watering. negative for vision changes  Ears: negative for fullness. negative for hearing loss. negative for dizziness.   Nose: negative for snoring.negative for changes in smell. negative for  drainage. Positive for congestion.  Throat: negative for hoarseness. negative for sore throat. negative for trouble swallowing.   Lungs: negative for cough. positive  for shortness of breath.positive  for wheezing. negative for sputum production.   Cardiovascular: negative for chest pain. negative for swelling of ankles. negative for fast or irregular heartbeat.   Gastrointestinal: negative for nausea. negative for heartburn. negative for acid reflux.   Musculoskeletal: negative for joint pain. negative for joint stiffness. negative for joint swelling.   Neurologic: negative for seizures. negative for fainting. negative for weakness.   Psychiatric: negative for changes in mood. negative for anxiety.   Endocrine: negative for cold intolerance. negative for heat intolerance. negative for tremors.   Hematologic: negative for easy bruising. negative for easy bleeding.  Integumentary: negative for rash. negative for scaling. negative for nail changes.       Current Outpatient Medications:      albuterol (PROAIR HFA) 108 (90 Base) MCG/ACT inhaler, Inhale 2 puffs into the lungs every 6 hours, Disp: 1 Inhaler, Rfl: 0     methylphenidate HCl ER (CONCERTA) 18 MG CR tablet, Take 1 tablet (18 mg) by mouth every morning, Disp: 30 tablet, Rfl: 0     montelukast (SINGULAIR) 10 MG tablet, Take 1 tablet (10 mg) by mouth At Bedtime, Disp: 90 tablet, Rfl: 3     EPINEPHrine (EPIPEN/ADRENACLICK/OR ANY BX GENERIC EQUIV) 0.3 MG/0.3ML injection 2-pack, Inject 0.3 mLs (0.3 mg) into the muscle as needed for anaphylaxis (Patient not taking: Reported on 10/30/2019), Disp: 2 each, Rfl: 1  Immunization History   Administered Date(s) Administered     DTAP (<7y) 1997, 01/28/1998, 03/30/1998, 01/20/1999, 10/23/2002     HEPA 10/30/2008, 06/01/2009     HPV 06/01/2009, 10/29/2009, 03/29/2010     HepB 1997, 1997, 06/29/1998     Hib (PRP-T) 1997, 01/28/1998, 03/30/1998, 01/20/1999     Influenza (H1N1) 01/19/2010     Influenza  (IIV3) PF 10/14/1998, 11/18/1998, 12/04/2006, 10/10/2007, 10/30/2008, 10/29/2009, 12/01/2010, 10/18/2011, 10/17/2012     Influenza Vaccine IM > 6 months Valent IIV4 12/05/2013, 01/15/2015, 10/13/2015, 11/30/2016, 11/02/2017, 12/06/2018, 10/21/2019     MMR 11/18/1998, 10/23/2002     Mantoux Tuberculin Skin Test 02/27/2019     Meningococcal (Menactra ) 06/01/2009, 10/14/2014     Pneumo Conj 13-V (2010&after) 09/06/2012     Poliovirus, inactivated (IPV) 1997, 01/28/1998, 09/29/1999, 10/23/2002     TD (ADULT, 7+) 02/27/2019     TDAP Vaccine (Boostrix) 06/01/2009     Varicella 11/28/1998, 10/30/2008     Allergies   Allergen Reactions     Adderall Angioedema     Bactrim Hives         EXAM:   Pulse 97   Temp 97.8  F (36.6  C) (Oral)   Wt 120.3 kg (265 lb 3.2 oz)   SpO2 96%   BMI 44.47 kg/m     GENERAL APPEARANCE: alert, cooperative and not in distress  SKIN: no rashes, no lesions  HEAD: atraumatic, normocephalic  EYES: lids and lashes normal, conjunctivae and sclerae clear, pupils equal, round, reactive to light, EOM full and intact  ENT: no scars or lesions, nasal exam showed no discharge, swelling or lesions noted, otoscopy showed external auditory canals clear, tympanic membranes normal, tongue midline and normal, soft palate, uvula, and tonsils normal  NECK: no asymmetry, masses, or scars, supple without significant adenopathy  LUNGS: unlabored respirations, no intercostal retractions or accessory muscle use, clear to auscultation without rales or wheezes  HEART: regular rate and rhythm without murmurs and normal S1 and S2  MUSCULOSKELETAL: no musculoskeletal defects are noted  NEURO: no focal deficits noted  PSYCH: does not appear depressed or anxious    WORKUP: Spirometry  SPIROMETRY       FVC 3.84L (114% of predicted).     FEV1 3.14L (106% of predicted).     FEV1/FVC 82%      I have reviewed and interpreted these results. These values are consistent with normal lung function.    ENVIRONMENTAL PERCUTANEOUS  SKIN TESTING: ADULT  [facility] 10/30/2019   Consent Y   Ordering Physician Dr. Cox   Interpreting Physician Dr. Cox   Testing Technician  Ilana MEHTA RN   Location Back   Time start: 11:34 AM   Time End: 11:50 AM   Positive Control: Histatrol*ALK 1 mg/ml 7x14   Negative Control: 50% Glycerin 0   Cat Hair*ALK (10,000 BAU/ml) 0   AP Dog Hair/Dander (1:100 w/v) 0   Dust Mite p. 30,000 AU/ml 0   Dust Mite f. (30,000 AU/ml) 0   Scott (W/F in millimeters) 0   Anson Grass (100,000 BAU/mL) 0   Red Cedar (W/F in millimeters) 0   Maple/[place] (W/F in millimeters) 0   Hackberry (W/F in millimeters) 0   Romney (W/F in millimeters) 0   Haywood *ALK (W/F in millimeters) 0   American Elm (W/F in millimeters) 0   Banner (W/F in millimeters) 5x8   Black [place] (W/F in millimeters) 5x8   Birch Mix (W/F in millimeters) 0   [place] (W/F in millimeters) 0   Oak (W/F in millimeters) 0   Cocklebur (W/F in millimeters) 0   Chandler (W/F in millimeters) 0   [place] (W/F in millimeters) 0   Careless (W/F in millimeters) 0   Nettle (W/F in millimeters) 0   English Plantain (W/F in millimeters) 0   Kochia (W/F in millimeters) 0   Lamb's Quarter (W/F in millimeters) 0   Marshelder (W/F in millimeters) 0   Ragweed Mix* ALK (W/F in millimeters) 0   Russian Thistle (W/F in millimeters) 0   Sagebrush/Mugwort (W/F in millimeters) 0   Sheep Sorrel (W/F in millimeters) 0   Feather Mix* ALK (W/F in millimeters) 0   Penicillium Mix (1:10 w/v) 0   Curvularia spicifera (1:10 w/v) 0   Epicoccum (1:10 w/v) 0   Aspergillus fumigatus (1:10 w/v): 0   Alternaria tenius (1:10 w/v) 4x8   H. Cladosporium (1:10 w/v) 0   Phoma herbarum (1:10 w/v) 0      Appropriate response to controls, positive to trees and Alternaria    ASSESSMENT/PLAN:  Juanita Gonzalez is a 24 year old female here for evaluation of recurrent hives and facial swelling. Her symptoms have been ongoing for more than 6 weeks and are consistent with chronic  urticaria. She has had associated episodes of angioedema including a recent episode of angioedema and shortness of breath. Skin testing was positive for sensitization to trees and molds which is unlikely to account for her current symptoms. Additionally we discussed that chronic urticaria is typically not caused by underlying food or environmental allergies.     1. Begin taking loratadine 20mg every morning  2. Increase cetirizine to 20mg every evening  3. Begin famotidine 20mg twice daily  4. Begin montelukast 10mg daily  5. Will check CBC, CMP, TSH, Tryptase, C4, C1 esterase inhibitor total and functional, and complement component C1q  6. Take 2 to 4 puffs of albuterol HFA every 4 hours as needed  7. Follow-up in 2 to 3 months, sooner if needed      Thank you for allowing me to participate in the care of Juanita Gonzalez.      Dax Cox MD  Allergy/Immunology  [facility] and [place], [place]      Chart documentation done in part with Dragon Voice Recognition Software. Although reviewed after completion, some word and grammatical errors may remain.    Again, thank you for allowing me to participate in the care of your patient.        Sincerely,        Dax Cox MD

## 2019-10-31 LAB
C TRACH DNA SPEC QL NAA+PROBE: NEGATIVE
C4 SERPL-MCNC: 49 MG/DL (ref 15–50)
HBV SURFACE AG SERPL QL IA: NONREACTIVE
HIV 1+2 AB+HIV1 P24 AG SERPL QL IA: NONREACTIVE
N GONORRHOEA DNA SPEC QL NAA+PROBE: NEGATIVE
SPECIMEN SOURCE: NORMAL
SPECIMEN SOURCE: NORMAL
T PALLIDUM AB SER QL: NONREACTIVE

## 2019-11-01 LAB
C1INH FUNCTIONAL/C1INH TOTAL MFR SERPL: 111 %
C1INH SERPL-MCNC: 35 MG/DL (ref 21–39)

## 2019-11-04 LAB — TRYPTASE SERPL-MCNC: 4.2 UG/L

## 2019-11-05 ENCOUNTER — NURSE TRIAGE (OUTPATIENT)
Dept: FAMILY MEDICINE | Facility: CLINIC | Age: 24
End: 2019-11-05

## 2019-11-05 LAB — C1Q SERPL-MCNC: 145 UG/ML (ref 109–242)

## 2019-11-05 NOTE — TELEPHONE ENCOUNTER
"Patient has reactions to bug bites, mother was with patient and states this is likely a bug bite but patient disagrees.  Mother stated \"I dont think she has ever had any formal allergy testing\" patient replied and told mother she has (was last seen in allergy on 10/30/19).  Patient does not see any marks to skin to indicate she was stung or bit.  Her forearm is swollen and red- she reports this is how she does react to bug bites but what is different is she now has pain and warmth and she is not able to see any marks on skin.  Swelling is localized and has irregular boarder.  Advised patient she should schedule appointment to make sure she does not have an infection.  Denies fever.  Her past reactions to bug bites have never been this large.  Visit scheduled tomorrow.  Advised to take bendryl- already taking, and use ice pack and ibuprofen or tylenol for pain.      Reason for Disposition    [1] Swelling is painful to touch AND [2] no fever    Additional Information    Negative: SEVERE pain (e.g., excruciating)    Negative: [1] Swelling is painful to touch AND [2] fever    Negative: [1] Swelling is red AND [2] fever    Negative: [1] Swelling is red AND [2] size > 2 inches (5.0 cm) (Exception: itchy area of skin)    Negative: [1] Swelling of groin (inguinal area) AND [2] painful    Negative: Patient sounds very sick or weak to the triager    Negative: Sounds like a life-threatening emergency to the triager    Negative: Small growth, spot, bump, or pigmented area of skin (e.g., moles, skin tags, wart, melanoma, skin cancer)    Negative: Inguinal hernia previously diagnosed by a physician    Negative: Followed a skin injury    Negative: Follows an insect bite    Negative: Swelling of lymph node suspected    Negative: Swelling of vaccination site    Negative: Swelling of tongue    Negative: Swelling of lip    Negative: Swelling of eye    Negative: Swelling of entire face    Negative: Swelling of scrotum    Negative: " Swelling of labia    Negative: Swelling of surgical incision    Negative: Swelling of ankle joint    Negative: Swelling of elbow joint    Negative: Swelling of knee joint    Negative: Swelling with a skin rash    Protocols used: SKIN LUMP OR LOCALIZED SWELLING-MAXIMUS Maria RN

## 2019-11-06 ENCOUNTER — OFFICE VISIT (OUTPATIENT)
Dept: URGENT CARE | Facility: URGENT CARE | Age: 24
End: 2019-11-06
Payer: COMMERCIAL

## 2019-11-06 VITALS
WEIGHT: 268 LBS | TEMPERATURE: 98.1 F | HEART RATE: 119 BPM | SYSTOLIC BLOOD PRESSURE: 139 MMHG | RESPIRATION RATE: 19 BRPM | BODY MASS INDEX: 44.94 KG/M2 | DIASTOLIC BLOOD PRESSURE: 76 MMHG | OXYGEN SATURATION: 97 %

## 2019-11-06 DIAGNOSIS — R51.9 NONINTRACTABLE HEADACHE, UNSPECIFIED CHRONICITY PATTERN, UNSPECIFIED HEADACHE TYPE: ICD-10-CM

## 2019-11-06 DIAGNOSIS — M25.40 JOINT SWELLING: ICD-10-CM

## 2019-11-06 DIAGNOSIS — M79.89 LEFT ARM SWELLING: Primary | ICD-10-CM

## 2019-11-06 LAB
BASOPHILS # BLD AUTO: 0 10E9/L (ref 0–0.2)
BASOPHILS NFR BLD AUTO: 0.2 %
DIFFERENTIAL METHOD BLD: ABNORMAL
EOSINOPHIL # BLD AUTO: 0.2 10E9/L (ref 0–0.7)
EOSINOPHIL NFR BLD AUTO: 2.4 %
ERYTHROCYTE [DISTWIDTH] IN BLOOD BY AUTOMATED COUNT: 16.7 % (ref 10–15)
ERYTHROCYTE [SEDIMENTATION RATE] IN BLOOD BY WESTERGREN METHOD: 10 MM/H (ref 0–20)
HCT VFR BLD AUTO: 37.7 % (ref 35–47)
HGB BLD-MCNC: 12 G/DL (ref 11.7–15.7)
LYMPHOCYTES # BLD AUTO: 2.7 10E9/L (ref 0.8–5.3)
LYMPHOCYTES NFR BLD AUTO: 27.5 %
MCH RBC QN AUTO: 25.8 PG (ref 26.5–33)
MCHC RBC AUTO-ENTMCNC: 31.8 G/DL (ref 31.5–36.5)
MCV RBC AUTO: 81 FL (ref 78–100)
MONOCYTES # BLD AUTO: 0.8 10E9/L (ref 0–1.3)
MONOCYTES NFR BLD AUTO: 8.2 %
NEUTROPHILS # BLD AUTO: 6 10E9/L (ref 1.6–8.3)
NEUTROPHILS NFR BLD AUTO: 61.7 %
PLATELET # BLD AUTO: 299 10E9/L (ref 150–450)
RBC # BLD AUTO: 4.66 10E12/L (ref 3.8–5.2)
WBC # BLD AUTO: 9.7 10E9/L (ref 4–11)

## 2019-11-06 PROCEDURE — 36415 COLL VENOUS BLD VENIPUNCTURE: CPT | Performed by: PHYSICIAN ASSISTANT

## 2019-11-06 PROCEDURE — 85025 COMPLETE CBC W/AUTO DIFF WBC: CPT | Performed by: PHYSICIAN ASSISTANT

## 2019-11-06 PROCEDURE — 86140 C-REACTIVE PROTEIN: CPT | Performed by: PHYSICIAN ASSISTANT

## 2019-11-06 PROCEDURE — 85652 RBC SED RATE AUTOMATED: CPT | Performed by: PHYSICIAN ASSISTANT

## 2019-11-06 PROCEDURE — 86618 LYME DISEASE ANTIBODY: CPT | Performed by: PHYSICIAN ASSISTANT

## 2019-11-06 PROCEDURE — 80053 COMPREHEN METABOLIC PANEL: CPT | Performed by: PHYSICIAN ASSISTANT

## 2019-11-06 PROCEDURE — 99214 OFFICE O/P EST MOD 30 MIN: CPT | Performed by: PHYSICIAN ASSISTANT

## 2019-11-06 RX ORDER — PREDNISONE 20 MG/1
40 TABLET ORAL DAILY
Qty: 10 TABLET | Refills: 0 | Status: SHIPPED | OUTPATIENT
Start: 2019-11-06 | End: 2019-11-21

## 2019-11-06 RX ORDER — DEXAMETHASONE SODIUM PHOSPHATE 4 MG/ML
10 VIAL (ML) INJECTION ONCE
Status: COMPLETED | OUTPATIENT
Start: 2019-11-06 | End: 2019-11-06

## 2019-11-06 RX ADMIN — Medication 10 MG: at 19:45

## 2019-11-06 ASSESSMENT — ENCOUNTER SYMPTOMS
PALPITATIONS: 0
CHILLS: 0
ARTHRALGIAS: 1
ENDOCRINE NEGATIVE: 1
HEMATOLOGIC/LYMPHATIC NEGATIVE: 1
RESPIRATORY NEGATIVE: 1
NECK STIFFNESS: 0
DIZZINESS: 0
LIGHT-HEADEDNESS: 0
HEADACHES: 1
RHINORRHEA: 0
BRUISES/BLEEDS EASILY: 0
SORE THROAT: 0
CARDIOVASCULAR NEGATIVE: 1
DIARRHEA: 0
COLOR CHANGE: 1
NECK PAIN: 0
MYALGIAS: 1
SHORTNESS OF BREATH: 0
WEAKNESS: 0
WOUND: 0
JOINT SWELLING: 0
BACK PAIN: 0
ALLERGIC/IMMUNOLOGIC NEGATIVE: 1
COUGH: 0
EYES NEGATIVE: 1
VOMITING: 0
NAUSEA: 0
FEVER: 0

## 2019-11-07 ENCOUNTER — MYC MEDICAL ADVICE (OUTPATIENT)
Dept: ALLERGY | Facility: CLINIC | Age: 24
End: 2019-11-07

## 2019-11-07 ENCOUNTER — OFFICE VISIT (OUTPATIENT)
Dept: FAMILY MEDICINE | Facility: CLINIC | Age: 24
End: 2019-11-07
Payer: COMMERCIAL

## 2019-11-07 ENCOUNTER — OFFICE VISIT (OUTPATIENT)
Dept: UROLOGY | Facility: CLINIC | Age: 24
End: 2019-11-07
Payer: COMMERCIAL

## 2019-11-07 VITALS — OXYGEN SATURATION: 97 % | DIASTOLIC BLOOD PRESSURE: 76 MMHG | SYSTOLIC BLOOD PRESSURE: 138 MMHG | HEART RATE: 105 BPM

## 2019-11-07 VITALS
BODY MASS INDEX: 44.65 KG/M2 | WEIGHT: 268 LBS | TEMPERATURE: 97 F | OXYGEN SATURATION: 100 % | HEART RATE: 106 BPM | SYSTOLIC BLOOD PRESSURE: 132 MMHG | HEIGHT: 65 IN | DIASTOLIC BLOOD PRESSURE: 70 MMHG | RESPIRATION RATE: 22 BRPM

## 2019-11-07 DIAGNOSIS — N39.44 NOCTURNAL ENURESIS: ICD-10-CM

## 2019-11-07 DIAGNOSIS — L03.114 CELLULITIS OF LEFT UPPER EXTREMITY: Primary | ICD-10-CM

## 2019-11-07 DIAGNOSIS — N39.41 URGENCY INCONTINENCE: Primary | ICD-10-CM

## 2019-11-07 LAB
ALBUMIN SERPL-MCNC: 3.6 G/DL (ref 3.4–5)
ALBUMIN UR-MCNC: NEGATIVE MG/DL
ALP SERPL-CCNC: 94 U/L (ref 40–150)
ALT SERPL W P-5'-P-CCNC: 32 U/L (ref 0–50)
ANION GAP SERPL CALCULATED.3IONS-SCNC: 6 MMOL/L (ref 3–14)
APPEARANCE UR: CLEAR
AST SERPL W P-5'-P-CCNC: 23 U/L (ref 0–45)
B BURGDOR IGG+IGM SER QL: 0.15 (ref 0–0.89)
BACTERIA #/AREA URNS HPF: ABNORMAL /HPF
BILIRUB SERPL-MCNC: 0.2 MG/DL (ref 0.2–1.3)
BILIRUB UR QL STRIP: NEGATIVE
BUN SERPL-MCNC: 8 MG/DL (ref 7–30)
CALCIUM SERPL-MCNC: 8.8 MG/DL (ref 8.5–10.1)
CHLORIDE SERPL-SCNC: 107 MMOL/L (ref 94–109)
CO2 SERPL-SCNC: 27 MMOL/L (ref 20–32)
COLOR UR AUTO: YELLOW
CREAT SERPL-MCNC: 0.69 MG/DL (ref 0.52–1.04)
CRP SERPL-MCNC: 37.2 MG/L (ref 0–8)
GFR SERPL CREATININE-BSD FRML MDRD: >90 ML/MIN/{1.73_M2}
GLUCOSE SERPL-MCNC: 96 MG/DL (ref 70–99)
GLUCOSE UR STRIP-MCNC: NEGATIVE MG/DL
HGB UR QL STRIP: ABNORMAL
KETONES UR STRIP-MCNC: NEGATIVE MG/DL
LEUKOCYTE ESTERASE UR QL STRIP: NEGATIVE
MUCOUS THREADS #/AREA URNS LPF: PRESENT /LPF
NITRATE UR QL: NEGATIVE
NON-SQ EPI CELLS #/AREA URNS LPF: ABNORMAL /LPF
PH UR STRIP: 5.5 PH (ref 5–7)
POTASSIUM SERPL-SCNC: 3.8 MMOL/L (ref 3.4–5.3)
PROT SERPL-MCNC: 7.4 G/DL (ref 6.8–8.8)
RBC #/AREA URNS AUTO: ABNORMAL /HPF
SODIUM SERPL-SCNC: 140 MMOL/L (ref 133–144)
SOURCE: ABNORMAL
SP GR UR STRIP: 1.01 (ref 1–1.03)
UROBILINOGEN UR STRIP-ACNC: 0.2 EU/DL (ref 0.2–1)
WBC #/AREA URNS AUTO: ABNORMAL /HPF

## 2019-11-07 PROCEDURE — 99214 OFFICE O/P EST MOD 30 MIN: CPT | Mod: 25 | Performed by: UROLOGY

## 2019-11-07 PROCEDURE — 81001 URINALYSIS AUTO W/SCOPE: CPT | Performed by: UROLOGY

## 2019-11-07 PROCEDURE — 99213 OFFICE O/P EST LOW 20 MIN: CPT | Performed by: PHYSICIAN ASSISTANT

## 2019-11-07 PROCEDURE — 52000 CYSTOURETHROSCOPY: CPT | Performed by: UROLOGY

## 2019-11-07 ASSESSMENT — ANXIETY QUESTIONNAIRES
2. NOT BEING ABLE TO STOP OR CONTROL WORRYING: NOT AT ALL
IF YOU CHECKED OFF ANY PROBLEMS ON THIS QUESTIONNAIRE, HOW DIFFICULT HAVE THESE PROBLEMS MADE IT FOR YOU TO DO YOUR WORK, TAKE CARE OF THINGS AT HOME, OR GET ALONG WITH OTHER PEOPLE: SOMEWHAT DIFFICULT
1. FEELING NERVOUS, ANXIOUS, OR ON EDGE: SEVERAL DAYS
7. FEELING AFRAID AS IF SOMETHING AWFUL MIGHT HAPPEN: NOT AT ALL
GAD7 TOTAL SCORE: 3
5. BEING SO RESTLESS THAT IT IS HARD TO SIT STILL: NOT AT ALL
6. BECOMING EASILY ANNOYED OR IRRITABLE: SEVERAL DAYS
3. WORRYING TOO MUCH ABOUT DIFFERENT THINGS: SEVERAL DAYS

## 2019-11-07 ASSESSMENT — PATIENT HEALTH QUESTIONNAIRE - PHQ9
SUM OF ALL RESPONSES TO PHQ QUESTIONS 1-9: 2
5. POOR APPETITE OR OVEREATING: NOT AT ALL

## 2019-11-07 ASSESSMENT — MIFFLIN-ST. JEOR: SCORE: 1972.55

## 2019-11-07 NOTE — PATIENT INSTRUCTIONS
Patient Education     Cellulitis  Cellulitis is an infection of the deep layers of skin. A break in the skin, such as a cut or scratch, can let bacteria under the skin. If the bacteria get to deep layers of the skin, it can be serious. If not treated, cellulitis can get into the bloodstream and lymph nodes. The infection can then spread throughout the body. This causes serious illness.  Cellulitis causes the affected skin to become red, swollen, warm, and sore. The reddened areas have a visible border. An open sore may leak fluid (pus). You may have a fever, chills, and pain.  Cellulitis is treated with antibiotics taken for 7 to 10 days. An open sore may be cleaned and covered with cool wet gauze. Symptoms should get better 1 to 2 days after treatment is started. Make sure to take all the antibiotics for the full number of days until they are gone. Keep taking the medicine even if your symptoms go away.  Home care  Follow these tips:    Limit the use of the part of your body with cellulitis.     If the infection is on your leg, keep your leg raised while sitting. This will help to reduce swelling.    Take all of the antibiotic medicine exactly as directed until it is gone. Do not miss any doses, especially during the first 7 days. Don t stop taking the medicine when your symptoms get better.    Keep the affected area clean and dry.    Wash your hands with soap and warm water before and after touching your skin. Anyone else who touches your skin should also wash his or her hands. Don't share towels.  Follow-up care  Follow up with your healthcare provider, or as advised. If your infection does not go away on the first antibiotic, your healthcare provider will prescribe a different one.  When to seek medical advice  Call your healthcare provider right away if any of these occur:    Red areas that spread    Swelling or pain that gets worse    Fluid leaking from the skin (pus)    Fever higher of 100.4  F (38.0  C) or  higher after 2 days on antibiotics  Date Last Reviewed: 9/1/2016 2000-2018 [employer]. [address] [place] [address], [place] [address]. All rights reserved. This information is not intended as a substitute for professional medical care. Always follow your healthcare professional's instructions.

## 2019-11-07 NOTE — PROGRESS NOTES
"Juanita Gonzalez is a 24 year old female seen in consultation for nocturnal enuresis. Consult from Clinic, [facility].      Pt states that she had significant primary nocturnal enuresis as a child, saw a specialist who prescribed alarm pad as well as some meds >> resolved until about 2 yrs ago.     Now with noc enuresis about 2x per month, sometimes large volumes. Seems worse when she is stressed and at the beginning of her menses.    Also some urge and rare urge incont. No LUIS.    States that she has had \"a lot\" of UTI's in the last several years (see urines below).    Seen by Dr Sosa in 4/18 for urinary urgency; exam was benign; advised to reduce caffeine, consider weight control, referral to PT.    Baseline weight is about #220; now up to 268#; she cannot describe any reason. She has not been found to have DM although there is some DM in her family.    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 Also 5 bottles of [place] (pt is sipping on [place] bottle as we take the hx). Has some [place] and juice in the evening hours.      Presently denies dysuria, gross hematuria, frequency; noc x 0-1.    Denies hx bladder surgery. Never pregnant. 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      Past Medical History:   Diagnosis Date     Allergic conjunctivitis      Anisometropia      Asthma      Attention deficit disorder with hyperactivity(314.01)     stopped medication 1 year ago     Myopia      Nonorganic enuresis     voiding dysfunction      Obesity, unspecified      OCD (obsessive compulsive disorder)      Wears contact lenses        Past Surgical History:   Procedure Laterality Date     DENTAL SURGERY       GRAFT BONE TO FINGER  3/18/2013    Procedure: GRAFT BONE TO FINGER;  Right Second Metacarpal Curettage and Allograft;  Surgeon: Jeremiah Yu MD;  [facility]      TOOTH EXTRACTION W/FORCEP       LE FORT ONE N/A 6/11/2015    Procedure: LE FORT ONE;  " Surgeon: Vitaliy Yun DDS;  Location: [facility] OR     LE FORT ONE , OSTEOTOMY SAGITTAL SPLIT, COMBINED N/A 12/3/2014    Procedure: COMBINED LE FORT ONE, OSTEOTOMY SAGITTAL SPLIT;  Surgeon: Vitaliy Yun DDS;  Location[facility]     TONGUE SURGERY  9/17/2014    tongue lesion removed       Social History     Socioeconomic History     Marital status: Single     Spouse name: Not on file     Number of children: Not on file     Years of education: Not on file     Highest education level: Not on file   Occupational History     Not on file   Social Needs     Financial resource strain: Not on file     Food insecurity:     Worry: Not on file     Inability: Not on file     Transportation needs:     Medical: Not on file     Non-medical: Not on file   Tobacco Use     Smoking status: Never Smoker     Smokeless tobacco: Never Used   Substance and Sexual Activity     Alcohol use: Yes     Comment: Occasional with friends.     Drug use: Yes     Types: Marijuana     Comment: occas marijuana     Sexual activity: Yes     Partners: Male     Birth control/protection: Condom     Comment: GC/ chlamydia sent 12/21/16.   Lifestyle     Physical activity:     Days per week: Not on file     Minutes per session: Not on file     Stress: Not on file   Relationships     Social connections:     Talks on phone: Not on file     Gets together: Not on file     Attends Rastafari service: Not on file     Active member of club or organization: Not on file     Attends meetings of clubs or organizations: Not on file     Relationship status: Not on file     Intimate partner violence:     Fear of current or ex partner: Not on file     Emotionally abused: Not on file     Physically abused: Not on file     Forced sexual activity: Not on file   Other Topics Concern     Parent/sibling w/ CABG, MI or angioplasty before 65F 55M? Not Asked   Social History Narrative    Lives with her mom. In college via [employer], technically a senior in high school       Current Outpatient  Medications   Medication Sig Dispense Refill     albuterol (PROAIR HFA/PROVENTIL HFA/VENTOLIN HFA) 108 (90 Base) MCG/ACT inhaler Inhale 2 puffs into the lungs every 4 hours as needed for shortness of breath / dyspnea or wheezing 1 Inhaler 1     cetirizine (ZYRTEC) 10 MG tablet Take 1 tablet (10 mg) by mouth At Bedtime 30 tablet 5     EPINEPHrine (EPIPEN/ADRENACLICK/OR ANY BX GENERIC EQUIV) 0.3 MG/0.3ML injection 2-pack Inject 0.3 mLs (0.3 mg) into the muscle as needed for anaphylaxis 2 each 1     famotidine (PEPCID) 20 MG tablet Take 1 tablet (20 mg) by mouth 2 times daily 60 tablet 5     loratadine (CLARITIN) 10 MG tablet Take 1 tablet (10 mg) by mouth every morning 30 tablet 5     methylphenidate HCl ER (CONCERTA) 18 MG CR tablet Take 1 tablet (18 mg) by mouth every morning 30 tablet 0     montelukast (SINGULAIR) 10 MG tablet Take 1 tablet (10 mg) by mouth At Bedtime 90 tablet 3     predniSONE (DELTASONE) 20 MG tablet Take 2 tablets (40 mg) by mouth daily for 5 days 10 tablet 0       Physical Exam:    GENL: NAD.    ABD: Soft, non-tender, no masses.    EG: Well-estrogenized, no masses.    VAGINA: Well-estrogenized, no masses.    BN HYPERMOBILITY: None.    CYSTOCELE: None.    APICAL PROLAPSE: None.    RECTOCELE: None.    BIMANUAL: No mass or tenderness.    Cysto:    (Informed consent obtained. Pause for cause performed)   Sterile prep.    17 Fr scope inserted through urethra. Systematic examination w 70 degree lens.   PVR: 5 cc   MUCOSA: Normal without lesion   ORIFICES: Normal location and morphology   CAPACITY: 600 cc; no pain with filling   Scope withdrawn without untoward effect.    (Pt tolerated procedure without difficulty).        3/29/18 UA: equivocal  4/10/18 UA: negative, 1.020  7/14/19 UA: negative, <1.005  9/17/19 UA: negative, 1.010    Today:  Results for orders placed or performed in visit on 11/07/19   UA reflex to Microscopic     Status: Abnormal   Result Value Ref Range    Color Urine Yellow      Appearance Urine Clear     Glucose Urine Negative NEG^Negative mg/dL    Bilirubin Urine Negative NEG^Negative    Ketones Urine Negative NEG^Negative mg/dL    Specific Gravity Urine 1.015 1.003 - 1.035    Blood Urine Trace (A) NEG^Negative    pH Urine 5.5 5.0 - 7.0 pH    Protein Albumin Urine Negative NEG^Negative mg/dL    Urobilinogen Urine 0.2 0.2 - 1.0 EU/dL    Nitrite Urine Negative NEG^Negative    Leukocyte Esterase Urine Negative NEG^Negative    Source Catheterized Urine    Urine Microscopic     Status: Abnormal   Result Value Ref Range    WBC Urine 0 - 5 OTO5^0 - 5 /HPF    RBC Urine O - 2 OTO2^O - 2 /HPF    Squamous Epithelial /LPF Urine Moderate (A) FEW^Few /LPF    Bacteria Urine Few (A) NEG^Negative /HPF    Mucous Urine Present (A) NEG^Negative /LPF           IMP:  1. Nocturnal enuresis, secondary; likely functional; satisfactory bladder exam today      PLAN:  1. Discussed situation with patient in detail.  2. Consider moderation of [place] and coffee; pt expresses understanding  3. Also encouraged to watch calorie count; pt has been 'eating out' a lot lately and has had more trouble making healthy food choices; she understands the importance of making positive change  4. RTC 1 mo to review progress  5. Set realistic expectations  6. 60 minutes spent with patient, more than 50% in counseling and coordination of care for enuresis, which did not include time spent for the procedure.

## 2019-11-07 NOTE — NURSING NOTE
The following medication was given:     MEDICATION: Decadron 4mg/mL IM   ROUTE: oral  SITE: mouth  DOSE: 10 ml   LOT #: [id number]  :  [facility]  EXPIRATION DATE:  10/2020  NDC#: 19424-373-67      Cinthia Chavez

## 2019-11-07 NOTE — PROGRESS NOTES
Chief Complaint:    Chief Complaint   Patient presents with     Edema     left arm x 2 days       HPI: Juanita Gonzalez is an 24 year old female who presents for evaluation and treatment of L arm redness and swelling.  Symptoms started 2 days ago when she was in [place] and have been worsening.  The arm is now painful in the swollen area.  She has had allergic reactions to insect bites similar to this in the past.  She has tried  She has had some headaches that come and go, and joint pains that come and go for over a year now. She denies any fever, nausea or vomiting.         ROS:      Review of Systems   Constitutional: Negative for chills and fever.   HENT: Negative for congestion, ear pain, rhinorrhea and sore throat.    Eyes: Negative.    Respiratory: Negative.  Negative for cough and shortness of breath.    Cardiovascular: Negative.  Negative for chest pain and palpitations.   Gastrointestinal: Negative for diarrhea, nausea and vomiting.   Endocrine: Negative.    Genitourinary: Negative.    Musculoskeletal: Positive for arthralgias and myalgias. Negative for back pain, joint swelling, neck pain and neck stiffness.   Skin: Positive for color change and rash. Negative for wound.   Allergic/Immunologic: Negative.  Negative for immunocompromised state.   Neurological: Positive for headaches. Negative for dizziness, weakness and light-headedness.   Hematological: Negative.  Does not bruise/bleed easily.        Family History   Family History   Problem Relation Age of Onset     Asthma Mother      Obesity Father      Hypertension Maternal Grandmother      Cancer Maternal Grandfather      Hypertension Paternal Grandmother      Glaucoma Other         Mat Great Aunt/blindness     Glaucoma Maternal Aunt      Macular Degeneration No family hx of        Social History  Social History     Socioeconomic History     Marital status: Single     Spouse name: Not on file     Number of children: Not on file     Years of  education: Not on file     Highest education level: Not on file   Occupational History     Not on file   Social Needs     Financial resource strain: Not on file     Food insecurity:     Worry: Not on file     Inability: Not on file     Transportation needs:     Medical: Not on file     Non-medical: Not on file   Tobacco Use     Smoking status: Never Smoker     Smokeless tobacco: Never Used   Substance and Sexual Activity     Alcohol use: Yes     Comment: Occasional with friends.     Drug use: Yes     Types: Marijuana     Comment: occas marijuana     Sexual activity: Yes     Partners: Male     Birth control/protection: Condom     Comment: GC/ chlamydia sent 12/21/16.   Lifestyle     Physical activity:     Days per week: Not on file     Minutes per session: Not on file     Stress: Not on file   Relationships     Social connections:     Talks on phone: Not on file     Gets together: Not on file     Attends Quaker service: Not on file     Active member of club or organization: Not on file     Attends meetings of clubs or organizations: Not on file     Relationship status: Not on file     Intimate partner violence:     Fear of current or ex partner: Not on file     Emotionally abused: Not on file     Physically abused: Not on file     Forced sexual activity: Not on file   Other Topics Concern     Parent/sibling w/ CABG, MI or angioplasty before 65F 55M? Not Asked   Social History Narrative    Lives with her mom. In college via [employer], technically a senior in high school        Surgical History:  Past Surgical History:   Procedure Laterality Date     DENTAL SURGERY       GRAFT BONE TO FINGER  3/18/2013    Procedure: GRAFT BONE TO FINGER;  Right Second Metacarpal Curettage and Allograft;  Surgeon: Jeremiah Yu MD;  [facility]     HC TOOTH EXTRACTION W/FORCEP       LE FORT ONE N/A 6/11/2015    Procedure: LE FORT ONE;  Surgeon: Vitaliy Yun DDS;  [facility]     LE FORT ONE , OSTEOTOMY SAGITTAL  SPLIT, COMBINED N/A 12/3/2014    Procedure: COMBINED LE FORT ONE, OSTEOTOMY SAGITTAL SPLIT;  Surgeon: Vitaliy Yun DDS;  [facility]     TONGUE SURGERY  9/17/2014    tongue lesion removed        Problem List:  Patient Active Problem List   Diagnosis     Obesity     Attention deficit hyperactivity disorder (ADHD)     Disturbance in sleep behavior     Acanthosis nigricans     Acne     Seasonal allergies     Eczema     Vitamin D deficiency     Insulin resistance     Anisometropia     Dysmetabolic syndrome X     Myopia     Enchondroma of wrist or hand     Osteoma     Café au lait spot     Neck pain     Bilateral thoracic back pain     Morbid obesity (H)        Allergies:  Allergies   Allergen Reactions     Bactrim Hives        Current Meds:    Current Outpatient Medications:      albuterol (PROAIR HFA/PROVENTIL HFA/VENTOLIN HFA) 108 (90 Base) MCG/ACT inhaler, Inhale 2 puffs into the lungs every 4 hours as needed for shortness of breath / dyspnea or wheezing, Disp: 1 Inhaler, Rfl: 1     cetirizine (ZYRTEC) 10 MG tablet, Take 1 tablet (10 mg) by mouth At Bedtime, Disp: 30 tablet, Rfl: 5     EPINEPHrine (EPIPEN/ADRENACLICK/OR ANY BX GENERIC EQUIV) 0.3 MG/0.3ML injection 2-pack, Inject 0.3 mLs (0.3 mg) into the muscle as needed for anaphylaxis, Disp: 2 each, Rfl: 1     famotidine (PEPCID) 20 MG tablet, Take 1 tablet (20 mg) by mouth 2 times daily, Disp: 60 tablet, Rfl: 5     loratadine (CLARITIN) 10 MG tablet, Take 1 tablet (10 mg) by mouth every morning, Disp: 30 tablet, Rfl: 5     methylphenidate HCl ER (CONCERTA) 18 MG CR tablet, Take 1 tablet (18 mg) by mouth every morning, Disp: 30 tablet, Rfl: 0     montelukast (SINGULAIR) 10 MG tablet, Take 1 tablet (10 mg) by mouth At Bedtime, Disp: 90 tablet, Rfl: 3     predniSONE (DELTASONE) 20 MG tablet, Take 2 tablets (40 mg) by mouth daily for 5 days, Disp: 10 tablet, Rfl: 0  No current facility-administered medications for this visit.      PHYSICAL EXAM:     Vital  signs noted and reviewed by Ector Wilde PA-C  /76 (BP Location: Left arm, Patient Position: Chair, Cuff Size: Adult Large)   Pulse 119   Temp 98.1  F (36.7  C) (Oral)   Resp 19   Wt 121.6 kg (268 lb)   LMP 10/21/2019 (Exact Date)   SpO2 97%   BMI 44.94 kg/m       PEFR:    Physical Exam  Vitals signs and nursing note reviewed.   Constitutional:       General: She is not in acute distress.     Appearance: She is well-developed. She is not ill-appearing, toxic-appearing or diaphoretic.   HENT:      Head: Normocephalic and atraumatic.      Right Ear: Tympanic membrane and external ear normal. Tympanic membrane is not perforated, erythematous, retracted or bulging.      Left Ear: Tympanic membrane and external ear normal. Tympanic membrane is not perforated, erythematous, retracted or bulging.      Nose: No mucosal edema or rhinorrhea.      Mouth/Throat:      Pharynx: No oropharyngeal exudate or posterior oropharyngeal erythema.      Tonsils: No tonsillar abscesses.   Eyes:      Pupils: Pupils are equal, round, and reactive to light.   Neck:      Musculoskeletal: Full passive range of motion without pain, normal range of motion and neck supple.      Trachea: Trachea normal.   Cardiovascular:      Rate and Rhythm: Normal rate and regular rhythm.      Heart sounds: Normal heart sounds, S1 normal and S2 normal. No murmur. No friction rub. No gallop.    Pulmonary:      Effort: Pulmonary effort is normal. No respiratory distress.      Breath sounds: Normal breath sounds. No decreased breath sounds, wheezing, rhonchi or rales.   Abdominal:      General: Bowel sounds are normal. There is no distension.      Palpations: Abdomen is soft. Abdomen is not rigid. There is no mass.      Tenderness: There is no tenderness. There is no guarding or rebound.   Musculoskeletal:        Arms:       Comments: L forearm has erythema and mild edema.  Area is warm to touch and painful.  Full range of motion of wrist and digits.   L arm is neurologically intact.  Cap refill less than 2 seconds.   Lymphadenopathy:      Cervical: No cervical adenopathy.   Skin:     General: Skin is warm and dry.   Neurological:      Mental Status: She is alert and oriented to person, place, and time.      Cranial Nerves: No cranial nerve deficit.      Deep Tendon Reflexes: Reflexes are normal and symmetric.   Psychiatric:         Behavior: Behavior normal. Behavior is cooperative.         Thought Content: Thought content normal.         Judgement: Judgment normal.          Labs:     Results for orders placed or performed in visit on 11/06/19   CBC with platelets and differential     Status: Abnormal   Result Value Ref Range    WBC 9.7 4.0 - 11.0 10e9/L    RBC Count 4.66 3.8 - 5.2 10e12/L    Hemoglobin 12.0 11.7 - 15.7 g/dL    Hematocrit 37.7 35.0 - 47.0 %    MCV 81 78 - 100 fl    MCH 25.8 (L) 26.5 - 33.0 pg    MCHC 31.8 31.5 - 36.5 g/dL    RDW 16.7 (H) 10.0 - 15.0 %    Platelet Count 299 150 - 450 10e9/L    % Neutrophils 61.7 %    % Lymphocytes 27.5 %    % Monocytes 8.2 %    % Eosinophils 2.4 %    % Basophils 0.2 %    Absolute Neutrophil 6.0 1.6 - 8.3 10e9/L    Absolute Lymphocytes 2.7 0.8 - 5.3 10e9/L    Absolute Monocytes 0.8 0.0 - 1.3 10e9/L    Absolute Eosinophils 0.2 0.0 - 0.7 10e9/L    Absolute Basophils 0.0 0.0 - 0.2 10e9/L    Diff Method Automated Method    ESR: Erythrocyte sedimentation rate     Status: None   Result Value Ref Range    Sed Rate 10 0 - 20 mm/h       Medical Decision Making:    Differential Diagnosis:  Allergic reaction, cellulitis, DVT, lyme     ASSESSMENT:     1. Left arm swelling    2. Joint swelling    3. Nonintractable headache, unspecified chronicity pattern, unspecified headache type           PLAN:     Patient has had odd swelling, rash and joint pains for the past year.    At this time, I cannot rule out cellulitis, allergic reaction, lymes, or DVT.  Labs ordered.  CBC was unremarkable.  Patient will follow up with her PCP or  remaining lab results.  Patient given 10 Mg Decadron PO in clinic today.    Rx for Prednisone sent in.  If patient is not responding to this, may want to consider ultrasound to rule out clot.  Staff will assist her in getting an appointment to follow up with her PCP tomorrow in [place] for reevaluation.    Worrisome symptoms discussed with instructions to go to the ED.  Patient verbalized understanding and agreed with this plan.     Ector iWlde PA-C  11/6/2019, 7:08 PM

## 2019-11-07 NOTE — PROGRESS NOTES
"Subjective     Juanita Gonzalez is a 24 year old female who presents to clinic today for the following health issues:    HPI   ED/UC Followup:    Facility:  [facility]  Date of visit: 11/06/2019  Reason for visit: Left arm swellings   Current Status: swellings has decreased but stills have pain     ED notes reviewed.     Review of Systems   ROS COMP: Constitutional, HEENT, cardiovascular, pulmonary, gi and gu systems are negative, except as otherwise noted.      Objective    /70   Pulse 106   Temp 97  F (36.1  C) (Temporal)   Resp 22   Ht 1.645 m (5' 4.75\")   Wt 121.6 kg (268 lb)   LMP 10/21/2019 (Exact Date)   SpO2 100%   BMI 44.94 kg/m    Body mass index is 44.94 kg/m .  Physical Exam   GENERAL: healthy, alert and no distress  MS: Edema and tenderness of L anterior forearm.   SKIN: > 15 cm of spreading erythema and warmth.  Site demarcated for follow up.     Diagnostic Test Results:  none         Assessment & Plan     1. Cellulitis of left upper extremity  - amoxicillin-clavulanate (AUGMENTIN) 875-125 MG tablet; Take 1 tablet by mouth 2 times daily for 5 days  Dispense: 10 tablet; Refill: 0     Follow up in 24 hours if demarcation line passed  Follow up in 2-3 days if sx improving for recheck.  Patient amenable to this follow up plan.     Luis Carlos Mahoney PA-C  [facility]      "

## 2019-11-08 ASSESSMENT — ANXIETY QUESTIONNAIRES: GAD7 TOTAL SCORE: 3

## 2019-11-08 ASSESSMENT — ASTHMA QUESTIONNAIRES: ACT_TOTALSCORE: 23

## 2019-11-11 ENCOUNTER — RECORDS - HEALTHEAST (OUTPATIENT)
Dept: LAB | Facility: HOSPITAL | Age: 24
End: 2019-11-11

## 2019-11-11 ENCOUNTER — OFFICE VISIT (OUTPATIENT)
Dept: FAMILY MEDICINE | Facility: CLINIC | Age: 24
End: 2019-11-11
Payer: COMMERCIAL

## 2019-11-11 VITALS
RESPIRATION RATE: 20 BRPM | DIASTOLIC BLOOD PRESSURE: 62 MMHG | HEART RATE: 84 BPM | SYSTOLIC BLOOD PRESSURE: 134 MMHG | WEIGHT: 270 LBS | BODY MASS INDEX: 44.98 KG/M2 | HEIGHT: 65 IN | OXYGEN SATURATION: 99 % | TEMPERATURE: 98.9 F

## 2019-11-11 DIAGNOSIS — L03.114 CELLULITIS OF LEFT UPPER EXTREMITY: Primary | ICD-10-CM

## 2019-11-11 PROCEDURE — 99213 OFFICE O/P EST LOW 20 MIN: CPT | Performed by: PHYSICIAN ASSISTANT

## 2019-11-11 ASSESSMENT — MIFFLIN-ST. JEOR: SCORE: 1981.62

## 2019-11-11 NOTE — PROGRESS NOTES
"Subjective     Juanita Gonzalez is a 24 year old female who presents to clinic today for the following health issues:    HPI   Patient presents with:  RECHECK: left arm swelling. arm is doing better    Last day of Prednisone and Abx today.      Review of Systems   ROS COMP: Constitutional, HEENT, cardiovascular, pulmonary, gi and gu systems are negative, except as otherwise noted.      Objective    /62   Pulse 84   Temp 98.9  F (37.2  C) (Oral)   Resp 20   Ht 1.645 m (5' 4.75\")   Wt 122.5 kg (270 lb)   LMP 10/21/2019 (Exact Date)   SpO2 99%   BMI 45.28 kg/m    Body mass index is 45.28 kg/m .  Physical Exam   GENERAL: healthy, alert and no distress  SKIN: Marked improvement of erythema of LUE.  No swelling noted.  Sl increased temp evident.     Diagnostic Test Results:  none         Assessment & Plan     1. Cellulitis of left upper extremity, resolving  Follow up as needed.  Patient amenable to this follow up plan.        BMI:   Estimated body mass index is 45.28 kg/m  as calculated from the following:    Height as of this encounter: 1.645 m (5' 4.75\").    Weight as of this encounter: 122.5 kg (270 lb).               No follow-ups on file.    Luis Carlos Mahoney PA-C  [facility]      "

## 2019-11-13 LAB
GAMMA INTERFERON BACKGROUND BLD IA-ACNC: 0.01 IU/ML
M TB IFN-G BLD-IMP: NEGATIVE
MITOGEN IGNF BCKGRD COR BLD-ACNC: 0.01 IU/ML
MITOGEN IGNF BCKGRD COR BLD-ACNC: 0.01 IU/ML
QTF INTERPRETATION: NORMAL
QTF MITOGEN - NIL: 1.38 IU/ML

## 2019-11-14 NOTE — TELEPHONE ENCOUNTER
Please call patient regarding lab results - she has not read [employer] message. She should follow-up in 2-3 months.

## 2019-11-14 NOTE — TELEPHONE ENCOUNTER
RN left message for patient to return call to RN's direct line @ [phone].    Elenita Olson, RN

## 2019-11-14 NOTE — TELEPHONE ENCOUNTER
RN spoke with patient regarding lab results. patient verbalized understanding. No further questions or concerns.         Elenita Olson RN

## 2019-11-15 ENCOUNTER — TELEPHONE (OUTPATIENT)
Dept: OBGYN | Facility: CLINIC | Age: 24
End: 2019-11-15

## 2019-11-15 NOTE — TELEPHONE ENCOUNTER
Patient called to reschedule her visit for IUD insertion. Patient states her menstrual cycle came early and would like to be seen sooner. Writer found no availability. Please call back to discuss.

## 2019-11-18 NOTE — TELEPHONE ENCOUNTER
The patient is requesting a member of the [facility] Care Team to call to discuss other birthcontrol options.  Please advise.  Thank you.

## 2019-11-18 NOTE — TELEPHONE ENCOUNTER
Called and left message for patient to call back - I did let her know there are no openings at  - but are openings at [place] tomorrow with Johanna Vivar.  Elenita Matthews M.A.

## 2019-11-19 NOTE — TELEPHONE ENCOUNTER
Patient is a 24 year old female, . Today is her last day of her period and she is concerned she can't get her IUD inserted while not on her period. RN advised that IUD can still be inserted it is just more difficult. Patient states if it can't be done she would like to get the Nexplanon. Patient did speak to Dr. Gallegos regarding both types of birthcontrol.     RN advised to keep appointment, she should stick with birth control option that she feels is best for her.     Patient verbalized understanding and agreed to plan.     Vilma Jewell RN on 2019 at 9:17 AM

## 2019-11-21 ENCOUNTER — OFFICE VISIT (OUTPATIENT)
Dept: OBGYN | Facility: CLINIC | Age: 24
End: 2019-11-21
Payer: COMMERCIAL

## 2019-11-21 VITALS
HEART RATE: 102 BPM | OXYGEN SATURATION: 97 % | DIASTOLIC BLOOD PRESSURE: 85 MMHG | SYSTOLIC BLOOD PRESSURE: 126 MMHG | BODY MASS INDEX: 45.4 KG/M2 | WEIGHT: 270.7 LBS

## 2019-11-21 DIAGNOSIS — Z12.4 PAP SMEAR FOR CERVICAL CANCER SCREENING: ICD-10-CM

## 2019-11-21 DIAGNOSIS — Z30.430 ENCOUNTER FOR IUD INSERTION: Primary | ICD-10-CM

## 2019-11-21 LAB — HCG UR QL: NEGATIVE

## 2019-11-21 PROCEDURE — 81025 URINE PREGNANCY TEST: CPT | Performed by: OBSTETRICS & GYNECOLOGY

## 2019-11-21 PROCEDURE — 58300 INSERT INTRAUTERINE DEVICE: CPT | Performed by: OBSTETRICS & GYNECOLOGY

## 2019-11-21 PROCEDURE — G0145 SCR C/V CYTO,THINLAYER,RESCR: HCPCS | Performed by: OBSTETRICS & GYNECOLOGY

## 2019-11-21 NOTE — PROGRESS NOTES
Juanita is a 24 year old female, .  She has thought further about the options and she desires to have the Mirena IUD inserted.  She would like to better control the menses.  She has been considering the information that we reviewed at her past visit.  We reviewed the IUD options and the associated goals and limitations and the risks and benefits.  She desires to have the Mirena IUD.  She had the menses and it stopped yesterday.   She has not had a pap smear and she has attained the age for the pap smear to be performed.  The pap smear and the procedure were discussed and questions answered.  She voiced her understanding.     The patient's medical history, social history and family history are reviewed and updated, as needed.     Review of Systems:  10 point ROS of systems including Constitutional, Eyes, Respiratory, Cardiovascular, Gastroenterology, Genitourinary, Integumentary, Muscularskeletal, Psychiatric were all negative except for pertinent positives noted in my HPI and in the PMH.      Exam  /85 (BP Location: Right arm, Cuff Size: Adult Large)   Pulse 102   Wt 122.8 kg (270 lb 11.2 oz)   LMP 2019   SpO2 97%   BMI 45.40 kg/m    General:  WNWD female, NAD  Alert  Oriented x 3  Gait:  Normal  Skin:  Normal skin turgor  HEENT:  NC/AT, EOMI  Abdomen:  Non-tender, non-distended.  Vulva: No external lesions, normal hair distribution, no adenopathy  BUS:  Normal, no masses noted  Urethra:  No hypermobility seen  Urethral meatus:  No masses noted  Vagina: Moist, pink, no abnormal discharge, well rugated, no lesions  Cervix: Smooth, pink, no visible lesions  Uterus: Normal size, anteverted, non-tender, mobile  Ovaries: No mass, non-tender, mobile  Perianal:  No masses noted  Extremities:  No clubbing, no cyanosis and no edema.       Assessment  Encounter for IUD insertion  Pap smear screen      Plan  The patient desires to have the Mirena IUD.  Ibuprofen 600 mg is given to her.   Pap smear today.      Justen Gallegos MD        PROCEDURE  I discussed the method, effectiveness, contraindications, risks, benefits, alternatives and the insertion procedure itself.  Patient was an appropriate candidate and desired to proceed.  Consent signed.  Pregnancy test today is negative.  GC/Chlamydia testing was performed 10/30/2019.  After appropriate preparation, the cervix was grasped with a tenaculum and the uterine cavity sounded to 8 cm.  The Mirena IUD was inserted using the standard and sterile technique.  The strings were trimmed to approximately 2.5 cm.  No apparent complications.  Patient tolerated the procedure well.  The IUD effectiveness is 5 years.  Followup in 1 month for IUD check, yearly for annual exams.  She should alert us to any GYN problems.      IUD DEVICE:  LOT # [id number]  EXP Date Jan 2022      Justen Gallegos MD

## 2019-11-27 LAB
COPATH REPORT: NORMAL
PAP: NORMAL

## 2019-12-20 ENCOUNTER — OFFICE VISIT (OUTPATIENT)
Dept: OBGYN | Facility: CLINIC | Age: 24
End: 2019-12-20
Payer: COMMERCIAL

## 2019-12-20 VITALS
HEART RATE: 97 BPM | DIASTOLIC BLOOD PRESSURE: 68 MMHG | SYSTOLIC BLOOD PRESSURE: 123 MMHG | WEIGHT: 271.6 LBS | BODY MASS INDEX: 45.55 KG/M2 | OXYGEN SATURATION: 97 %

## 2019-12-20 DIAGNOSIS — Z30.431 IUD CHECK UP: Primary | ICD-10-CM

## 2019-12-20 PROCEDURE — 99212 OFFICE O/P EST SF 10 MIN: CPT | Performed by: OBSTETRICS & GYNECOLOGY

## 2019-12-20 NOTE — PATIENT INSTRUCTIONS
If you have any questions regarding your visit, Please contact your care team.    Women [facility] Health CLINIC HOURS TELEPHONE NUMBER   MD Debbie Toribio MA Lisa -      NICHELLE Esparza       Tuesday:       7:30-3:30 [place]  Wednesday:       8:00-4:30 [place]  Thursday:       8:00-4:00 [place]  Friday:       7:30-12:30 [facility]  [address] [place] [address]  [phone] ask for Women's Clinic    [facility]  [address] [place] [address]  [phone] ask for [facility]    Imaging Scheduling for [place]:  [phone]    Imaging Scheduling for [place]: [phone]       Urgent Care locations:    [facility] Saturday and Sunday   9 am - 5 pm    Monday-Friday   12 pm - 8 pm  Saturday and Sunday   9 am - 5 pm   [phone]     [facility] Labor and Delivery:  [phone]    If you need a medication refill, please contact your pharmacy. Please allow 3 business days for your refill to be completed.  As always, Thank you for trusting us with your healthcare needs!

## 2019-12-20 NOTE — PROGRESS NOTES
Juanita is a 24 year old  here for IUD surveillance.  She had the Mirena IUD inserted one month ago.  She has not had any problems with the IUD.  No unusual discharge or bleeding.  No cramping.  She does not have any apparent contraindications for continued use of the IUD.      PMH: Her past medical, surgical, and obstetric histories were reviewed and are documented in their appropriate chart areas.    ALL/Meds: Her medication and allergy histories were reviewed and are documented in their appropriate chart areas.    ROS:4 point ROS including Respiratory, CV, GI and , other than that noted in the HPI,  is negative     EXAM:  /68   Pulse 97   Wt 123.2 kg (271 lb 9.6 oz)   SpO2 97%   BMI 45.55 kg/m    General:  WNWD female, NAD  Alert  Oriented x 3  Gait:  Normal  Skin:  Normal skin turgor  HEENT:  NC/AT, EOMI  Abdomen:  Non-tender, non-distended.  Vulva: No external lesions, normal hair distribution, no adenopathy  BUS:  Normal, no masses noted  Urethra:  No hypermobility seen  Urethral meatus:  No masses noted  Vagina: Moist, pink, no abnormal discharge, well rugated, no lesions  Cervix: Smooth, pink, no visible lesions, no CMT, IUD strings seen  Uterus: Normal size, anteverted, non-tender, mobile  Ovaries: No mass, non-tender, mobile  Perianal:  No masses noted  Extremities:  No clubbing, no cyanosis and no edema.      ASSESSMENT:  IUD check     PLAN:  Return to routine care.

## 2020-02-04 ENCOUNTER — OFFICE VISIT - HEALTHEAST (OUTPATIENT)
Dept: FAMILY MEDICINE | Facility: CLINIC | Age: 25
End: 2020-02-04

## 2020-02-04 ENCOUNTER — COMMUNICATION - HEALTHEAST (OUTPATIENT)
Dept: TELEHEALTH | Facility: CLINIC | Age: 25
End: 2020-02-04

## 2020-02-04 DIAGNOSIS — L52 ERYTHEMA NODOSUM: ICD-10-CM

## 2020-02-04 ASSESSMENT — MIFFLIN-ST. JEOR: SCORE: 1942.8

## 2020-02-05 ENCOUNTER — COMMUNICATION - HEALTHEAST (OUTPATIENT)
Dept: FAMILY MEDICINE | Facility: CLINIC | Age: 25
End: 2020-02-05

## 2020-02-19 ENCOUNTER — RECORDS - HEALTHEAST (OUTPATIENT)
Dept: LAB | Facility: HOSPITAL | Age: 25
End: 2020-02-19

## 2020-02-20 LAB — HBV SURFACE AB SERPL IA-ACNC: ABNORMAL M[IU]/ML

## 2020-07-20 NOTE — PROGRESS NOTES
Subjective     Juanita Gonzalez is a 24 year old female who presents to clinic today for the following health issues:    HPI       Patient presents with:  RECHECK: auto immune    Patient notes patches of swelling and urticaria to her forearms twice in the past 6 months.  She was diagnosed with cellulitis but notes that symtoms improved with prednisone.  She notes joint pain at the same time.  She notes pain to shoulders, elbows, wrists and knees.  Pain is worse in the am.  She denies family history of autoimmune disease.  Patient has not been taking antihistamines as directed by allergy for urticaria.  Patient denies pleuritic chest pain, rash, fever.    She notes that she had done lab work up at work and TSH and creatinine were elevated.  She would like to recheck.        Patient Active Problem List   Diagnosis     Obesity     Attention deficit hyperactivity disorder (ADHD)     Disturbance in sleep behavior     Acanthosis nigricans     Acne     Seasonal allergies     Eczema     Vitamin D deficiency     Insulin resistance     Anisometropia     Dysmetabolic syndrome X     Myopia     Enchondroma of wrist or hand     Osteoma     Café au lait spot     Neck pain     Bilateral thoracic back pain     Morbid obesity (H)     Past Surgical History:   Procedure Laterality Date     DENTAL SURGERY       GRAFT BONE TO FINGER  3/18/2013    Procedure: GRAFT BONE TO FINGER;  Right Second Metacarpal Curettage and Allograft;  Surgeon: Jeremiah Yu MD;  Location:  OR      TOOTH EXTRACTION W/FORCEP       LE FORT ONE N/A 6/11/2015    Procedure: LE FORT ONE;  Surgeon: Vitaliy Yun DDS;  Location: [facility] OR     LE FORT ONE , OSTEOTOMY SAGITTAL SPLIT, COMBINED N/A 12/3/2014    Procedure: COMBINED LE FORT ONE, OSTEOTOMY SAGITTAL SPLIT;  Surgeon: Vitaliy Yun DDS;  Location:  OR     TONGUE SURGERY  9/17/2014    tongue lesion removed       Social History     Tobacco Use     Smoking status: Never Smoker      Smokeless tobacco: Never Used   Substance Use Topics     Alcohol use: Yes     Comment: Occasional with friends.     Family History   Problem Relation Age of Onset     Asthma Mother      Obesity Father      Hypertension Maternal Grandmother      Cancer Maternal Grandfather      Hypertension Paternal Grandmother      Glaucoma Other         Mat Great Aunt/blindness     Glaucoma Maternal Aunt      Macular Degeneration No family hx of          Current Outpatient Medications   Medication Sig Dispense Refill     albuterol (PROAIR HFA/PROVENTIL HFA/VENTOLIN HFA) 108 (90 Base) MCG/ACT inhaler Inhale 2 puffs into the lungs every 4 hours as needed for shortness of breath / dyspnea or wheezing 1 Inhaler 1     cetirizine (ZYRTEC) 10 MG tablet Take 1 tablet (10 mg) by mouth At Bedtime 30 tablet 5     EPINEPHrine (EPIPEN/ADRENACLICK/OR ANY BX GENERIC EQUIV) 0.3 MG/0.3ML injection 2-pack Inject 0.3 mLs (0.3 mg) into the muscle as needed for anaphylaxis 2 each 1     famotidine (PEPCID) 20 MG tablet Take 1 tablet (20 mg) by mouth 2 times daily 60 tablet 5     levonorgestrel (MIRENA, 52 MG,) 20 MCG/24HR IUD 1 device, intrauterine, for up to 5 years. 1 each 0     loratadine (CLARITIN) 10 MG tablet Take 1 tablet (10 mg) by mouth every morning 30 tablet 5     montelukast (SINGULAIR) 10 MG tablet Take 1 tablet (10 mg) by mouth At Bedtime 90 tablet 3     Allergies   Allergen Reactions     Bactrim Hives     BP Readings from Last 3 Encounters:   07/21/20 116/80   12/20/19 123/68   11/21/19 126/85    Wt Readings from Last 3 Encounters:   07/21/20 116.8 kg (257 lb 9.6 oz)   12/20/19 123.2 kg (271 lb 9.6 oz)   11/21/19 122.8 kg (270 lb 11.2 oz)                    Reviewed and updated as needed this visit by Provider         Review of Systems   Constitutional, HEENT, cardiovascular, pulmonary, gi and gu systems are negative, except as otherwise noted.      Objective    /80   Pulse 94   Temp 97.7  F (36.5  C) (Oral)   Resp 18   Ht 1.637  "m (5' 4.45\")   Wt 116.8 kg (257 lb 9.6 oz)   SpO2 99%   BMI 43.60 kg/m    Body mass index is 43.6 kg/m .  Physical Exam   GENERAL: healthy, alert and no distress  RESP: lungs clear to auscultation - no rales, rhonchi or wheezes  CV: regular rate and rhythm, normal S1 S2, no S3 or S4, no murmur, click or rub, no peripheral edema and peripheral pulses strong  MS: no gross musculoskeletal defects noted, no edema  SKIN: no suspicious lesions or rashes    Diagnostic Test Results:  none         Assessment & Plan     1. Chronic urticaria  Patient to resume meds and plan follow-up with allergy.  - cetirizine (ZYRTEC) 10 MG tablet; Take 1 tablet (10 mg) by mouth At Bedtime  Dispense: 30 tablet; Refill: 5  - famotidine (PEPCID) 20 MG tablet; Take 1 tablet (20 mg) by mouth 2 times daily  Dispense: 60 tablet; Refill: 5    2. Angioedema, initial encounter  As above.   - cetirizine (ZYRTEC) 10 MG tablet; Take 1 tablet (10 mg) by mouth At Bedtime  Dispense: 30 tablet; Refill: 5  - famotidine (PEPCID) 20 MG tablet; Take 1 tablet (20 mg) by mouth 2 times daily  Dispense: 60 tablet; Refill: 5    3. Mild intermittent asthma without complication  As above.   - montelukast (SINGULAIR) 10 MG tablet; Take 1 tablet (10 mg) by mouth At Bedtime  Dispense: 90 tablet; Refill: 3    4. Multiple joint pain  Evaluate for inflammatory arthritis.  - XR Hands & Wrists Bilateral 1 View; Future  - Anti Nuclear Yandy IgG by IFA with Reflex  - Complement C3  - Complement C4  - Cyclic Citrullinated Peptide Antibody IgG  - DNA double stranded antibodies  - Rheumatoid factor  - CRP inflammation  - Erythrocyte sedimentation rate auto  - CBC with platelets and differential  - Comprehensive metabolic panel (BMP + Alb, Alk Phos, ALT, AST, Total. Bili, TP)    5. Abnormal TSH    - TSH with free T4 reflex             Return for Pending test results. We will call with results..    PINKY [facility]"

## 2020-07-21 ENCOUNTER — OFFICE VISIT (OUTPATIENT)
Dept: FAMILY MEDICINE | Facility: CLINIC | Age: 25
End: 2020-07-21
Payer: COMMERCIAL

## 2020-07-21 ENCOUNTER — ANCILLARY PROCEDURE (OUTPATIENT)
Dept: GENERAL RADIOLOGY | Facility: CLINIC | Age: 25
End: 2020-07-21
Attending: NURSE PRACTITIONER
Payer: COMMERCIAL

## 2020-07-21 VITALS
SYSTOLIC BLOOD PRESSURE: 116 MMHG | TEMPERATURE: 97.7 F | RESPIRATION RATE: 18 BRPM | BODY MASS INDEX: 43.98 KG/M2 | HEIGHT: 64 IN | HEART RATE: 94 BPM | OXYGEN SATURATION: 99 % | DIASTOLIC BLOOD PRESSURE: 80 MMHG | WEIGHT: 257.6 LBS

## 2020-07-21 DIAGNOSIS — T78.3XXA ANGIOEDEMA, INITIAL ENCOUNTER: ICD-10-CM

## 2020-07-21 DIAGNOSIS — L50.8 CHRONIC URTICARIA: ICD-10-CM

## 2020-07-21 DIAGNOSIS — R79.89 ABNORMAL TSH: ICD-10-CM

## 2020-07-21 DIAGNOSIS — J45.20 MILD INTERMITTENT ASTHMA WITHOUT COMPLICATION: ICD-10-CM

## 2020-07-21 DIAGNOSIS — M25.50 MULTIPLE JOINT PAIN: ICD-10-CM

## 2020-07-21 DIAGNOSIS — M25.50 MULTIPLE JOINT PAIN: Primary | ICD-10-CM

## 2020-07-21 LAB
ALBUMIN SERPL-MCNC: 3.6 G/DL (ref 3.4–5)
ALP SERPL-CCNC: 88 U/L (ref 40–150)
ALT SERPL W P-5'-P-CCNC: 30 U/L (ref 0–50)
ANION GAP SERPL CALCULATED.3IONS-SCNC: 6 MMOL/L (ref 3–14)
AST SERPL W P-5'-P-CCNC: 20 U/L (ref 0–45)
BASOPHILS # BLD AUTO: 0 10E9/L (ref 0–0.2)
BASOPHILS NFR BLD AUTO: 0.2 %
BILIRUB SERPL-MCNC: 0.5 MG/DL (ref 0.2–1.3)
BUN SERPL-MCNC: 5 MG/DL (ref 7–30)
CALCIUM SERPL-MCNC: 8.3 MG/DL (ref 8.5–10.1)
CHLORIDE SERPL-SCNC: 108 MMOL/L (ref 94–109)
CO2 SERPL-SCNC: 25 MMOL/L (ref 20–32)
CREAT SERPL-MCNC: 0.66 MG/DL (ref 0.52–1.04)
CRP SERPL-MCNC: 29.7 MG/L (ref 0–8)
DIFFERENTIAL METHOD BLD: ABNORMAL
EOSINOPHIL # BLD AUTO: 0.1 10E9/L (ref 0–0.7)
EOSINOPHIL NFR BLD AUTO: 1.5 %
ERYTHROCYTE [DISTWIDTH] IN BLOOD BY AUTOMATED COUNT: 16.1 % (ref 10–15)
ERYTHROCYTE [SEDIMENTATION RATE] IN BLOOD BY WESTERGREN METHOD: 12 MM/H (ref 0–20)
GFR SERPL CREATININE-BSD FRML MDRD: >90 ML/MIN/{1.73_M2}
GLUCOSE SERPL-MCNC: 83 MG/DL (ref 70–99)
HCT VFR BLD AUTO: 37.1 % (ref 35–47)
HGB BLD-MCNC: 12.1 G/DL (ref 11.7–15.7)
LYMPHOCYTES # BLD AUTO: 2.3 10E9/L (ref 0.8–5.3)
LYMPHOCYTES NFR BLD AUTO: 27.3 %
MCH RBC QN AUTO: 26 PG (ref 26.5–33)
MCHC RBC AUTO-ENTMCNC: 32.6 G/DL (ref 31.5–36.5)
MCV RBC AUTO: 80 FL (ref 78–100)
MONOCYTES # BLD AUTO: 0.6 10E9/L (ref 0–1.3)
MONOCYTES NFR BLD AUTO: 7.4 %
NEUTROPHILS # BLD AUTO: 5.4 10E9/L (ref 1.6–8.3)
NEUTROPHILS NFR BLD AUTO: 63.6 %
PLATELET # BLD AUTO: 274 10E9/L (ref 150–450)
POTASSIUM SERPL-SCNC: 3.9 MMOL/L (ref 3.4–5.3)
PROT SERPL-MCNC: 7.3 G/DL (ref 6.8–8.8)
RBC # BLD AUTO: 4.66 10E12/L (ref 3.8–5.2)
SODIUM SERPL-SCNC: 139 MMOL/L (ref 133–144)
TSH SERPL DL<=0.005 MIU/L-ACNC: 3.06 MU/L (ref 0.4–4)
WBC # BLD AUTO: 8.5 10E9/L (ref 4–11)

## 2020-07-21 PROCEDURE — 73100 X-RAY EXAM OF WRIST: CPT | Mod: 59

## 2020-07-21 PROCEDURE — 86225 DNA ANTIBODY NATIVE: CPT | Performed by: NURSE PRACTITIONER

## 2020-07-21 PROCEDURE — 73120 X-RAY EXAM OF HAND: CPT | Mod: 59

## 2020-07-21 PROCEDURE — 86038 ANTINUCLEAR ANTIBODIES: CPT | Performed by: NURSE PRACTITIONER

## 2020-07-21 PROCEDURE — 36415 COLL VENOUS BLD VENIPUNCTURE: CPT | Performed by: NURSE PRACTITIONER

## 2020-07-21 PROCEDURE — 85652 RBC SED RATE AUTOMATED: CPT | Performed by: NURSE PRACTITIONER

## 2020-07-21 PROCEDURE — 86200 CCP ANTIBODY: CPT | Performed by: NURSE PRACTITIONER

## 2020-07-21 PROCEDURE — 86431 RHEUMATOID FACTOR QUANT: CPT | Performed by: NURSE PRACTITIONER

## 2020-07-21 PROCEDURE — 86140 C-REACTIVE PROTEIN: CPT | Performed by: NURSE PRACTITIONER

## 2020-07-21 PROCEDURE — 86160 COMPLEMENT ANTIGEN: CPT | Performed by: NURSE PRACTITIONER

## 2020-07-21 PROCEDURE — 99214 OFFICE O/P EST MOD 30 MIN: CPT | Performed by: NURSE PRACTITIONER

## 2020-07-21 PROCEDURE — 80050 GENERAL HEALTH PANEL: CPT | Performed by: NURSE PRACTITIONER

## 2020-07-21 RX ORDER — FAMOTIDINE 20 MG/1
20 TABLET, FILM COATED ORAL 2 TIMES DAILY
Qty: 60 TABLET | Refills: 5 | Status: SHIPPED | OUTPATIENT
Start: 2020-07-21 | End: 2021-12-10

## 2020-07-21 RX ORDER — CETIRIZINE HYDROCHLORIDE 10 MG/1
10 TABLET ORAL AT BEDTIME
Qty: 30 TABLET | Refills: 5 | Status: SHIPPED | OUTPATIENT
Start: 2020-07-21 | End: 2021-12-10

## 2020-07-21 RX ORDER — MONTELUKAST SODIUM 10 MG/1
10 TABLET ORAL AT BEDTIME
Qty: 90 TABLET | Refills: 3 | Status: SHIPPED | OUTPATIENT
Start: 2020-07-21 | End: 2021-07-20

## 2020-07-21 ASSESSMENT — MIFFLIN-ST. JEOR: SCORE: 1920.6

## 2020-07-21 ASSESSMENT — ASTHMA QUESTIONNAIRES
QUESTION_1 LAST FOUR WEEKS HOW MUCH OF THE TIME DID YOUR ASTHMA KEEP YOU FROM GETTING AS MUCH DONE AT WORK, SCHOOL OR AT HOME: A LITTLE OF THE TIME
QUESTION_5 LAST FOUR WEEKS HOW WOULD YOU RATE YOUR ASTHMA CONTROL: WELL CONTROLLED
ACT_TOTALSCORE: 21
QUESTION_4 LAST FOUR WEEKS HOW OFTEN HAVE YOU USED YOUR RESCUE INHALER OR NEBULIZER MEDICATION (SUCH AS ALBUTEROL): ONCE A WEEK OR LESS
QUESTION_2 LAST FOUR WEEKS HOW OFTEN HAVE YOU HAD SHORTNESS OF BREATH: ONCE OR TWICE A WEEK
QUESTION_3 LAST FOUR WEEKS HOW OFTEN DID YOUR ASTHMA SYMPTOMS (WHEEZING, COUGHING, SHORTNESS OF BREATH, CHEST TIGHTNESS OR PAIN) WAKE YOU UP AT NIGHT OR EARLIER THAN USUAL IN THE MORNING: NOT AT ALL

## 2020-07-21 NOTE — RESULT ENCOUNTER NOTE
Dear Juanita,    Your recent test results are attached.      No anemia.  Normal marker of inflammation.    If you have any questions please feel free to contact [phone] or myself via [employer].    Sincerely,  Tricia Rodriguez, CNP

## 2020-07-21 NOTE — RESULT ENCOUNTER NOTE
Dear Juanita,    Your recent test results are attached.      No acute findings on x-ray.    If you have any questions please feel free to contact [phone] or myself via [employer].    Sincerely,  Tricia Rodriguez, CNP

## 2020-07-22 LAB
ANA SER QL IF: NEGATIVE
C3 SERPL-MCNC: 151 MG/DL (ref 81–157)
C4 SERPL-MCNC: 48 MG/DL (ref 13–39)
RHEUMATOID FACT SER NEPH-ACNC: <7 IU/ML (ref 0–20)

## 2020-07-22 ASSESSMENT — ASTHMA QUESTIONNAIRES: ACT_TOTALSCORE: 21

## 2020-07-22 NOTE — RESULT ENCOUNTER NOTE
Dear Juanita,    Your recent test results are attached.      Elevated marker of inflammation.  Normal thyroid.  Normal kidney function and electrolytes.        If you have any questions please feel free to contact [phone] or myself via [employer].    Sincerely,  Tricia Rodriguez, CNP

## 2020-07-22 NOTE — RESULT ENCOUNTER NOTE
Dear Juanita,    Your recent test results are attached.      Normal autoimmune test.    If you have any questions please feel free to contact [phone] or myself via [employer].    Sincerely,  Tricia Rodriguez, CNP

## 2020-07-23 LAB
CCP AB SER IA-ACNC: 1 U/ML
DSDNA AB SER-ACNC: <1 IU/ML

## 2020-07-24 ENCOUNTER — TELEPHONE (OUTPATIENT)
Dept: FAMILY MEDICINE | Facility: CLINIC | Age: 25
End: 2020-07-24

## 2020-07-24 DIAGNOSIS — R79.82 CRP ELEVATED: Primary | ICD-10-CM

## 2020-07-24 NOTE — TELEPHONE ENCOUNTER
Attempted to reach pt to relay provider result note as written. There was no answer. LM to return call to RN at [phone]     RANJAN Neely, RN     ----- Message from PINKY Thakkar CNP sent at 7/24/2020 10:28 AM CDT -----  Please call patient-    Her labs show a mild elevation in her Complement C4 level and an elevated marker of inflammation.  These can be associated with autoimmune disease, particularly lupus.  I would like her to touch base with rheumatology for further evaluation (indication elevated CRP, complement C4).  It will take several months for her to get, so I would like her to let me know if she develops symptoms of rash, joint pain or swelling, fever, pleuritic chest pain.  This would indicate that she might need to start treatment sooner, but given that she is not having symptoms currently, I would like to hold on that until she sees rheumatology if possible.    Thanks,  Tricia Rodriguez CNP

## 2020-07-25 ENCOUNTER — NURSE TRIAGE (OUTPATIENT)
Dept: NURSING | Facility: CLINIC | Age: 25
End: 2020-07-25

## 2020-07-25 NOTE — TELEPHONE ENCOUNTER
"Pt calls in quite anxious about her lab results   Says she missed phone call from clinic yesterday   Would like to know what call was concerning     The following message read back to pt twice >    \"Her labs show a mild elevation in her Complement C4 level and an elevated marker of inflammation.  These can be associated with autoimmune disease, particularly lupus.  I would like her to touch base with rheumatology for further evaluation (indication elevated CRP, complement C4).  It will take several months for her to get, so I would like her to let me know if she develops symptoms of rash, joint pain or swelling, fever, pleuritic chest pain.  This would indicate that she might need to start treatment sooner, but given that she is not having symptoms currently, I would like to hold on that until she sees rheumatology if possible.     Thanks,  Tricia Rodriguez, CNP    \"      Pt says she does have \" joint discomfort \"    Pt will call Clinic when open Monday morning and discuss    Protocol and care advice reviewed  Caller states understanding of the recommended disposition    Advised to call back if further questions or concerns    Leland Hunter RN / [facility]                Reason for Disposition    [1] Caller requesting NON-URGENT health information AND [2] PCP's office is the best resource    Protocols used: INFORMATION ONLY CALL-A-AH      "

## 2020-07-27 NOTE — TELEPHONE ENCOUNTER
Referral signed.  Can we get more information on joint pain?  I'm referring to the swelling she gets to her forearms and wrist pain that she gets intermittently.  I would like her to call if that occurs again.    Tricia Rodriguez, CNP

## 2020-07-27 NOTE — TELEPHONE ENCOUNTER
Patient was already updated with results by FNA over the weekend (see phone encounter 7/25/2020)  She reported to FNA that she does have joint discomfort and wanted to discuss further    Left message on voicemail for patient to return call to RN hotline at # [phone].    Demetria Hermosillo RN

## 2020-07-29 NOTE — TELEPHONE ENCOUNTER
Patient called RN hotline. She was given referral info along with scheduling number.   She states that she experienced joint pain is forearms, elbows, and knees. Joint pain feels like pressure and then she pulled on her wrist a little bit and it goes away. If she turns her hand in certain positions it hurts more, then when she straightens it back out pain stops.   Rates pain when it occurs as 6/10. No has no swelling currently in forearms or joints. She usually gets swelling in random spots on her skin on her forearms and all over her arms, bilaterally, never anywhere else. It looks there are raised patches that look like mosquito bites. She describes them being like reddish small bumps/hives almost like she had an allergic reaction to something. It happens randomly every couple of months in winter months as well. She does not use any new creams or lotions, not allergic to any foods that she is aware. States that she had cellulitis at one point and area on skin was raised, hot, inflammed. She agreed to call us back if she develops any of these bumps or other raised areas in the future.

## 2020-07-29 NOTE — TELEPHONE ENCOUNTER
Left message on answering machine for patient to call back to the RN hotline at [phone].    Christina Plaza RN  [facility]

## 2020-10-14 ENCOUNTER — TELEPHONE (OUTPATIENT)
Dept: RHEUMATOLOGY | Facility: CLINIC | Age: 25
End: 2020-10-14

## 2020-10-14 NOTE — TELEPHONE ENCOUNTER
[facility] Call Center    Phone Message    May a detailed message be left on voicemail: yes     Reason for Call: Other: the pt has been referred to us for elevated CRP. She would like to go thru the review process. There are no other med recs concerning this. Thanks.    Action Taken: Message routed to:  [facility]    Travel Screening: Not Applicable

## 2020-10-19 NOTE — TELEPHONE ENCOUNTER
Scheduled patient.     PAULETTE SmithN, RN   Rheumatology Care Coordinator   [facility]

## 2020-10-19 NOTE — TELEPHONE ENCOUNTER
Called patient and left generic vm asking them to call the clinic when they received the message. Clinic number provided.    PAULETTE SmithN, RN   Rheumatology Care Coordinator   [facility]

## 2020-10-19 NOTE — TELEPHONE ENCOUNTER
[facility] Call Center    Phone Message    May a detailed message be left on voicemail: yes     Reason for Call: Patient returned call. Please call back. Thank you.    Action Taken: Message routed to:  Adult Clinics: Rheumatology p [id number]    Travel Screening: Not Applicable

## 2020-10-20 ENCOUNTER — OFFICE VISIT (OUTPATIENT)
Dept: MIDWIFE SERVICES | Facility: CLINIC | Age: 25
End: 2020-10-20
Payer: COMMERCIAL

## 2020-10-20 VITALS
TEMPERATURE: 97.8 F | BODY MASS INDEX: 43.33 KG/M2 | HEART RATE: 91 BPM | DIASTOLIC BLOOD PRESSURE: 84 MMHG | WEIGHT: 256 LBS | SYSTOLIC BLOOD PRESSURE: 151 MMHG

## 2020-10-20 DIAGNOSIS — Z11.3 SCREEN FOR STD (SEXUALLY TRANSMITTED DISEASE): ICD-10-CM

## 2020-10-20 DIAGNOSIS — Z30.431 IUD CHECK UP: Primary | ICD-10-CM

## 2020-10-20 PROCEDURE — 87491 CHLMYD TRACH DNA AMP PROBE: CPT | Performed by: ADVANCED PRACTICE MIDWIFE

## 2020-10-20 PROCEDURE — 99212 OFFICE O/P EST SF 10 MIN: CPT | Performed by: ADVANCED PRACTICE MIDWIFE

## 2020-10-20 PROCEDURE — 87591 N.GONORRHOEAE DNA AMP PROB: CPT | Performed by: ADVANCED PRACTICE MIDWIFE

## 2020-10-20 NOTE — PROGRESS NOTES
"S:  Juanita is here today to discuss her symptoms related to IUD. Mirena IUD was inserted on 11/21/2019. After insertion, she bled for about a week. Since then, she has had irregular spotting and cramping, usually about twice a month. Spotting and cramping will last for a couple hours and then subside. Bleeding is light and she does not need to use a tampon or bad. In the past couple months, she has noticed some cramping that feels more intense, which she describes as \"sharp pain\" in her abdomen directly below her belly button. The pain is usually gone after a few minutes, and it is not always related to the spotting, happening a couple times/month.     Juanita has not felt for IUD strings but isn't concerned that it is out of place. Her periods are very heavy without IUD. She was under the impression that she needed to follow up annually for IUD placement.     Blood pressure is elevated today, though this is abnormal for Juanita. She is seen regularly by other providers, so encouraged to follow up if additional elevated readings.     O:  BP (!) 151/84   Pulse 91   Temp 97.8  F (36.6  C) (Oral)   Wt 116.1 kg (256 lb)   BMI 43.33 kg/m      Alert and oriented  Affect is bright and appropriate to situation  Negative CVA tenderness  Abdomen is soft and nontender  GC/CT self collect, pelvic exam deferred    A/P:  (Z30.431) IUD check up  (primary encounter diagnosis)  Comment: No concern that IUD is out of place due to normal bleeding and spotting pattern. Discussed that sharp pain is located closer to GI tract that uterus and possibly related to bowel movements. Juanita think this could be likely. Discussed location of uterus and ovaries and encouraged to return to clinic if pain and cramping shifts location and seems like menstrual cramps. No need for speculum exam to confirm placement due to IUD bleeding pattern and no suspicion of expulsion.    (Z11.3) Screen for STD (sexually transmitted disease)  Plan: NEISSERIA " GONORRHOEA PCR, CHLAMYDIA TRACHOMATIS        PCR    Iram Ramirez, PINKY CNM

## 2020-10-21 NOTE — RESULT ENCOUNTER NOTE
Han Gratn,    Great to see you yesterday. Attached are your lab results. You tested negative for both chlamydia and gonorrhea. If you have any questions or want to discuss this more, feel free to call our clinic at [phone] or send me a [employer] message.    PINKY Montelongo CNM

## 2020-10-23 NOTE — PROGRESS NOTES
RHEUMATOLOGY INITIAL CONSULT NOTE    Chief Complaint:    Chief Complaint   Patient presents with     Consult     Elevated CRP       Reason for consult: Tricia Rodriguez from  has requested consultation for this patient for elevated CRP    History of Present Illness:  Ms Juanita Gonzalez is a 24 yo female with pmhx of enchondroma, asthma, urticaria referred to rheumatology for elevated CRP.     She has a hx of intermittent swelling/redness over her skin along with myalgias for which she has presented to  before. She has been evaluated by allergy/immunology in Nov 2019 for hx of urticaria and angioedema. W/u at that time returned negative for C1 esterase inhibitor and complement levels.  Her hives and angioedema started ~2 years ago, she does not remember any inciting triggers. Her skin testing was positive for sensitazition for trees and molds, but allergy/immunology does not feel this is the reason for her symptoms. At this time, the etiology of her urticaria and angioedema are unclear. Her urticaria/UE rash comes on around every 2 months, sometimes its on one arm and sometimes both. She does not always have accompanying lip swelling with hit but she did over the summer. Last episode of angioedema was last year in the winter time. The rash is itchy and warm when it comes on and it gets bigger and stays for a few hours and then self-resolves. She does not take epi for the rash because the hives does not always affect her breathing. She has not been taking any medications for this. The way she describes her rash is diffuse erythema over the arm with pruritis but does not always have raised hives like areas. She describes her hives as small mosquito bite size lesions. One time, when she was in [place] she noticed that the small mosquito bite sized lesions coalesced into a big area of redness which was diagnosed as cellulitis. More recently, she has noticed some greenish/bluish bruising in the inner arms which resolved  within 1 day. It was not painful but maybe a little tender. Denies any rashes over her LEs, trunk, back or face. She denies any changes in her soaps, detergents, lotions. Denies any known allergies to foods. She has been trying to keep a log of what she is eating to see if she can figure out what is triggering these rashes but she has not found a pattern. She feels that her symptoms are very sporadic and fleeting. She does not take any medications there were prescribed to her.     She has been noticing bilateral wrist tenderness since ~2 years when she wakes up and some numbness/tingling in the middle of the night or early morning but she does not notice any worsening symptoms with the rash. She was advised to wear wrist splints which helped but she does not wear it all the time. Wrist pain is dull and gets better after a few minutes if she applies pressure and keeps it in a certain position. Pain is not there everyday. She also notes some knee pain that started around the same time which gets worse with climbing stairs or being on her feet for a few hours. Reports AM stiffness lasting 1-2 minutes which quickly resolves with stretching. Denies any joint swelling. Does not take any medications for pain. Denies any abdominal pain with the rash. Denies any eye inflammation or conjunctivitis with her rash, used to have conjunctivitis as a child. Denies recent flares of asthma.     Past Medical History:    Past Medical History:   Diagnosis Date     Allergic conjunctivitis      Anisometropia      Asthma      Attention deficit disorder with hyperactivity(314.01)     stopped medication 1 year ago     Myopia      Nonorganic enuresis     voiding dysfunction      Obesity, unspecified      OCD (obsessive compulsive disorder)      Wears contact lenses      Past Surgical History:   Past Surgical History:   Procedure Laterality Date     DENTAL SURGERY       GRAFT BONE TO FINGER  3/18/2013    Procedure: GRAFT BONE TO FINGER;  Right  Second Metacarpal Curettage and Allograft;  Surgeon: Jeremiah Yu MD;  Location: [facility]     HC TOOTH EXTRACTION W/FORCEP       LE FORT ONE N/A 6/11/2015    Procedure: LE FORT ONE;  Surgeon: Vitaliy Yun DDS;  Location: [facility] OR     LE FORT ONE , OSTEOTOMY SAGITTAL SPLIT, COMBINED N/A 12/3/2014    Procedure: COMBINED LE FORT ONE, OSTEOTOMY SAGITTAL SPLIT;  Surgeon: Vitaliy Yun DDS;  Location: [facility] OR     TONGUE SURGERY  9/17/2014    tongue lesion removed     Family History:   Family History   Problem Relation Age of Onset     Asthma Mother      Obesity Father      Hypertension Maternal Grandmother      Cancer Maternal Grandfather      Hypertension Paternal Grandmother      Glaucoma Other         Mat Great Aunt/blindness     Glaucoma Maternal Aunt      Macular Degeneration No family hx of      Denies any family hx of SLE, Sjogren's syndrome, vasculitis, allergies/urticaria    Social History:   Social History     Socioeconomic History     Marital status: Single     Spouse name: Not on file     Number of children: Not on file     Years of education: Not on file     Highest education level: Not on file   Occupational History     Not on file   Social Needs     Financial resource strain: Not on file     Food insecurity     Worry: Not on file     Inability: Not on file     Transportation needs     Medical: Not on file     Non-medical: Not on file   Tobacco Use     Smoking status: Never Smoker     Smokeless tobacco: Never Used   Substance and Sexual Activity     Alcohol use: Yes     Comment: Occasional with friends.     Drug use: Not Currently     Types: Marijuana     Comment: occas marijuana     Sexual activity: Yes     Partners: Male     Birth control/protection: I.U.D.     Comment: GC/ chlamydia sent 12/21/16.   Lifestyle     Physical activity     Days per week: Not on file     Minutes per session: Not on file     Stress: Not on file   Relationships     Social connections     Talks on phone: Not on file      Gets together: Not on file     Attends Ca[employer]ism service: Not on file     Active member of club or organization: Not on file     Attends meetings of clubs or organizations: Not on file     Relationship status: Not on file     Intimate partner violence     Fear of current or ex partner: Not on file     Emotionally abused: Not on file     Physically abused: Not on file     Forced sexual activity: Not on file   Other Topics Concern     Parent/sibling w/ CABG, MI or angioplasty before 65F 55M? Not Asked   Social History Narrative    Lives with her mom. In college via [employer], technically a senior in high school     Tobacco use: none  Alcohol use: 5 drinks at a time, couple times a month  Smokes marijuana daily  Occupational hx: works as a phlebotomist  Sexual hx: denies current sexual activity, denies prior STIs    Allergies   Allergen Reactions     Bactrim Hives      Immunization History   Administered Date(s) Administered     DTAP (<7y) 1997, 01/28/1998, 03/30/1998, 01/20/1999, 10/23/2002     FLU 6-35 months 12/04/2006, 10/10/2007, 10/30/2008, 10/29/2009, 12/01/2010, 10/18/2011, 10/17/2012     HEPA 10/30/2008, 06/01/2009     HPV 06/01/2009, 10/29/2009, 03/29/2010     HPV Quadrivalent 06/01/2009, 10/29/2009, 03/29/2010     HepA-ped 2 Dose 10/30/2008, 06/01/2009     HepB 1997, 1997, 06/29/1998     Hib (PRP-T) 1997, 01/28/1998, 03/30/1998, 01/20/1999     Influenza (H1N1) 01/19/2010     Influenza (IIV3) PF 10/14/1998, 11/18/1998, 12/04/2006, 10/10/2007, 10/30/2008, 10/29/2009, 12/01/2010, 10/18/2011, 10/17/2012     Influenza Vaccine IM > 6 months Valent IIV4 12/05/2013, 01/15/2015, 10/13/2015, 11/30/2016, 11/02/2017, 12/06/2018, 10/21/2019     MMR 11/18/1998, 10/23/2002     Mantoux Tuberculin Skin Test 02/27/2019     Meningococcal (Menactra ) 06/01/2009, 10/14/2014     Meningococcal (Menveo ) 10/14/2014     Pneumo Conj 13-V (2010&after) 09/06/2012     Poliovirus, inactivated (IPV) 1997,  "01/28/1998, 09/29/1999, 10/23/2002     TD (ADULT, 7+) 02/27/2019     TDAP Vaccine (Boostrix) 06/01/2009     Varicella 11/28/1998, 10/30/2008        Medications:  Current Outpatient Medications   Medication Sig Dispense Refill     albuterol (PROAIR HFA/PROVENTIL HFA/VENTOLIN HFA) 108 (90 Base) MCG/ACT inhaler Inhale 2 puffs into the lungs every 4 hours as needed for shortness of breath / dyspnea or wheezing 1 Inhaler 1     cetirizine (ZYRTEC) 10 MG tablet Take 1 tablet (10 mg) by mouth At Bedtime 30 tablet 5     EPINEPHrine (EPIPEN/ADRENACLICK/OR ANY BX GENERIC EQUIV) 0.3 MG/0.3ML injection 2-pack Inject 0.3 mLs (0.3 mg) into the muscle as needed for anaphylaxis 2 each 1     famotidine (PEPCID) 20 MG tablet Take 1 tablet (20 mg) by mouth 2 times daily 60 tablet 5     levonorgestrel (MIRENA, 52 MG,) 20 MCG/24HR IUD 1 device, intrauterine, for up to 5 years. 1 each 0     loratadine (CLARITIN) 10 MG tablet Take 1 tablet (10 mg) by mouth every morning 30 tablet 5     montelukast (SINGULAIR) 10 MG tablet Take 1 tablet (10 mg) by mouth At Bedtime 90 tablet 3       REVIEW OF SYSTEMS:  Constitutional: Denies fevers, chills, +fatigue, weight loss or night sweats  HEENT: +chronic headaches, blurry vision, redness, drainage, dry eyes, dry mouth, oral/nasal ulcers, hair loss/thinning  Dermatologic: +skin rash per HPI, denies Raynaud's  Respiratory: Denies cough, shortness of breath, pleurisy  Cardiovascular: Denies chest pain, edema, lightheadedness, +intermittent dizziness  Gastrointestinal: Denies heartburn, difficulty swallowing, abdominal pain, nausea, vomiting, +intermittent diarrhea which she attributes to caffeine intake and diet, denies constipation, hematochezia or melena  Genitourinary: Denies dysuria or hematuria  Musculoskeletal: see HPI  Neurologic: +occasional b/l wrist numbness/tingling, denies weakness, falls    PHYSICAL EXAMINATION:   Vital signs:    /71   Pulse 94   Ht 1.626 m (5' 4\")   Wt 117 kg (258 " lb)   SpO2 98%   BMI 44.29 kg/m      Gen: alert, awake, NAD  Skin: warm, dry, no rashes today, no scarring noted  HEENT: EOMI, PERRL, MMM, no oral ulcers/lesions, no alopecia  CV: RRR, normal s1 and s2, no m/r/g  Pulm: CTAB, non-labored respirations, no c/r/w  GI: +BS, soft, no TTP  MSK: no synovitis of any joints, no effusions, very mild pain elicited with flexion of L wrist, no knee joint tenderness, no restriction in ROM of any joints  Neuro: A&O x3, no focal deficits  Psych: pleasant, cooperative    Labs:      RF/CCP  Recent Labs   Lab Test 07/21/20  1201   CCPIGG 1   RHF <7     JEREMY  Recent Labs   Lab Test 07/21/20  1201   BEATRIZ Negative     dsDNA  Recent Labs   Lab Test 07/21/20  1201   DNA <1     C3/C4  Recent Labs   Lab Test 07/21/20  1201 10/30/19  1205   [id number] 151  --    [id number] 48* 49     CBC  Recent Labs   Lab Test 07/21/20  1201 11/06/19 1916 09/17/19 1836   WBC 8.5 9.7 15.6*   RBC 4.66 4.66 4.87   HGB 12.1 12.0 12.3   HCT 37.1 37.7 38.7   MCV 80 81 80   RDW 16.1* 16.7* 16.3*    299 338   MCH 26.0* 25.8* 25.3*   MCHC 32.6 31.8 31.8   NEUTROPHIL 63.6 61.7 52.7   LYMPH 27.3 27.5 39.2   MONOCYTE 7.4 8.2 6.4   EOSINOPHIL 1.5 2.4 1.5   BASOPHIL 0.2 0.2 0.2   ANEU 5.4 6.0 8.2   ALYM 2.3 2.7 6.1*   BASIL 0.6 0.8 1.0   AEOS 0.1 0.2 0.2   ABAS 0.0 0.0 0.0     CMP  Recent Labs   Lab Test 07/21/20  1201 11/06/19 1953 09/17/19  1836 09/23/15  2338 06/11/13  1009    140 138 138  --    POTASSIUM 3.9 3.8 3.9 3.4  --    CHLORIDE 108 107 104 105  --    CO2 25 27 26 27  --    ANIONGAP 6 6 8 6  --    GLC 83 96 76 75 88   BUN 5* 8 10 5*  --    CR 0.66 0.69 0.75 0.70  --    GFRESTIMATED >90 >90 >90 >90  Non African American GFR Calc    --    GFRESTBLACK >90 >90 >90 >90  African American GFR Calc    --    CELENA 8.3* 8.8 8.9 8.2*  --    BILITOTAL 0.5 0.2 0.2  --   --    ALBUMIN 3.6 3.6 3.8  --   --    PROTTOTAL 7.3 7.4 7.5  --   --    ALKPHOS 88 94 85  --   --    AST 20 23 17  --   --    ALT 30 32 26  --   --       HgA1c  Recent Labs   Lab Test 02/11/15  0739   A1C 5.4     Calcium/VitaminD  Recent Labs   Lab Test 07/21/20  1201 11/06/19  1953 09/17/19  1836 04/30/14  1613 04/30/14  1613 06/11/13  1009   CELENA 8.3* 8.8 8.9   < >  --   --    VITDT  --   --   --   --  33 33    < > = values in this interval not displayed.     ESR/CRP  Recent Labs   Lab Test 07/21/20  1201 11/06/19  1953 11/06/19  1916 09/17/19  1836   SED 12 10  --  9   CRP 29.7*  --  37.2*  --      TSH/T4  Recent Labs   Lab Test 07/21/20  1201 10/30/19  1205 02/11/15  0739 06/11/13  1009   TSH 3.06 0.88 0.99 2.61   T4  --   --  1.00 0.80     Lipid Panel  Recent Labs   Lab Test 06/11/13  1009   CHOL 123   TRIG 62   HDL 36*   LDL 74   VLDL 12   CHOLHDLRATIO 3.4     Hepatitis B  Recent Labs   Lab Test 10/30/19  1538   HEPBANG Nonreactive     HIV Screening  Recent Labs   Lab Test 10/30/19  1538   HIAGAB Nonreactive     UA  Recent Labs   Lab Test 11/07/19  0849 09/17/19  1833 07/14/19  0946 04/10/18  1608 03/29/17  1449   COLOR Yellow Yellow Yellow Yellow Yellow   APPEARANCE Clear Clear Clear Clear Clear   URINEGLC Negative Negative Negative Negative Negative   URINEBILI Negative Negative Negative Negative Negative   SG 1.015 1.010 <=1.005 1.020 >1.030   URINEPH 5.5 5.5 7.0 7.0 5.5   PROTEIN Negative Negative Negative Negative 30*   UROBILINOGEN 0.2 0.2 0.2 0.2 0.2   NITRITE Negative Negative Negative Negative Negative   UBLD Trace* Negative Negative Trace* Large*   LEUKEST Negative Negative Negative Negative Negative   WBCU 0 - 5  --   --  0 - 5 O - 2   RBCU O - 2  --   --  O - 2 >100*   SQUAMOUSEPI Moderate*  --   --  Few Few   BACTERIA Few*  --   --   --   --    MUCOUS Present*  --   --   --   --      Urine Microscopic  Recent Labs   Lab Test 11/07/19  0849 04/10/18  1608 03/29/17  1449   WBCU 0 - 5 0 - 5 O - 2   RBCU O - 2 O - 2 >100*   SQUAMOUSEPI Moderate* Few Few   BACTERIA Few*  --   --    MUCOUS Present*  --   --      Component      Latest Ref Rng & Units  "10/30/2019   [id number] Inhib Funct      >=41 % 111   [id number] Inhibitor      21 - 39 mg/dL 35   Complement C4      15 - 50 mg/dL 49   Complement C1Q      109 - 242 ug/mL 145       Imaging:   Imaging reivewed    Assessment / Plan:    Ms Juanita Gonzalez is a 24 yo female with pmhx of enchondroma, asthma, urticaria referred to rheumatology for elevated CRP. Rheumatologic work-up initiated by PCP and returned negative for JEREMY screen, RF and CCP in July. Labs remarkable for elevated CRP. Ddx for her elevated CRP includes possible urticarial vasculitis given her hx of urticaria and angioedema - her complements have been wnl along with C1 esterase inhibitor and pattern of her urticarial rash which resolves within a few hours goes against urticarial vasculitis. Urticarial vasculitis typically causes LCV rash, which she does not describe. She does note some \"bruising\" recently but it does not last more than a day. She does not have any s/s of SLE or Sjogren's or any CTD. We discussed the differential in detail and overall low suspicion for this being rheumatologic. We discussed repeating her inflammatory markers today in addition to some other labs to complete the work-up. I encouraged her to follow-up with her allergist and take the medications prescribed to see if helps her symptoms. It would be beneficial to obtain skin biopsy if the rash is lasting longer than a day, to get the most information.     1. UE fleeting rash/hives  -check C, SPEP, UA, repeat CRP and ESR  -advised pt to contact PCP for derm evaluation when rash occurs  -follow-up with allergy/immunology    2. Intermittent b/l wrist pain/numbness  -does not sound inflammatory in nature  -no synovitis on exam today  -RF, CCP negative in July  -Plain films largely unremarkable in July  -recommended discussion with PCP and considering w/u for CTS  -encouraged trying wrist splints    3. B/l knee pain  -does not sound inflammatory in nature  -no joint " tenderness/effusions   -advised weight loss  -PT referral    4. Headaches  -more constant per pt  -advised discussion with PCP for further evaluation    RTC pending labs

## 2020-10-30 ENCOUNTER — OFFICE VISIT (OUTPATIENT)
Dept: RHEUMATOLOGY | Facility: CLINIC | Age: 25
End: 2020-10-30
Payer: COMMERCIAL

## 2020-10-30 ENCOUNTER — TELEPHONE (OUTPATIENT)
Dept: RHEUMATOLOGY | Facility: CLINIC | Age: 25
End: 2020-10-30

## 2020-10-30 VITALS
WEIGHT: 258 LBS | OXYGEN SATURATION: 98 % | SYSTOLIC BLOOD PRESSURE: 105 MMHG | HEART RATE: 94 BPM | BODY MASS INDEX: 44.05 KG/M2 | DIASTOLIC BLOOD PRESSURE: 71 MMHG | HEIGHT: 64 IN

## 2020-10-30 DIAGNOSIS — L50.9 URTICARIA: ICD-10-CM

## 2020-10-30 DIAGNOSIS — R79.82 ELEVATED C-REACTIVE PROTEIN (CRP): Primary | ICD-10-CM

## 2020-10-30 LAB
ALBUMIN UR-MCNC: NEGATIVE MG/DL
AMORPH CRY #/AREA URNS HPF: ABNORMAL /HPF
APPEARANCE UR: ABNORMAL
BACTERIA #/AREA URNS HPF: ABNORMAL /HPF
BILIRUB UR QL STRIP: NEGATIVE
COLOR UR AUTO: YELLOW
CRP SERPL-MCNC: 24 MG/L (ref 0–8)
ERYTHROCYTE [SEDIMENTATION RATE] IN BLOOD BY WESTERGREN METHOD: 9 MM/H (ref 0–20)
GLUCOSE UR STRIP-MCNC: NEGATIVE MG/DL
HGB UR QL STRIP: ABNORMAL
KETONES UR STRIP-MCNC: NEGATIVE MG/DL
LEUKOCYTE ESTERASE UR QL STRIP: ABNORMAL
NITRATE UR QL: NEGATIVE
NON-SQ EPI CELLS #/AREA URNS LPF: ABNORMAL /LPF
PH UR STRIP: 7.5 PH (ref 5–7)
RBC #/AREA URNS AUTO: ABNORMAL /HPF
SOURCE: ABNORMAL
SP GR UR STRIP: 1.02 (ref 1–1.03)
URNS CMNT MICRO: ABNORMAL
UROBILINOGEN UR STRIP-MCNC: NORMAL MG/DL (ref 0–2)
WBC #/AREA URNS AUTO: ABNORMAL /HPF

## 2020-10-30 PROCEDURE — 86140 C-REACTIVE PROTEIN: CPT | Performed by: STUDENT IN AN ORGANIZED HEALTH CARE EDUCATION/TRAINING PROGRAM

## 2020-10-30 PROCEDURE — 36415 COLL VENOUS BLD VENIPUNCTURE: CPT | Performed by: STUDENT IN AN ORGANIZED HEALTH CARE EDUCATION/TRAINING PROGRAM

## 2020-10-30 PROCEDURE — 81001 URINALYSIS AUTO W/SCOPE: CPT | Performed by: STUDENT IN AN ORGANIZED HEALTH CARE EDUCATION/TRAINING PROGRAM

## 2020-10-30 PROCEDURE — 84165 PROTEIN E-PHORESIS SERUM: CPT | Performed by: PATHOLOGY

## 2020-10-30 PROCEDURE — 86803 HEPATITIS C AB TEST: CPT | Performed by: STUDENT IN AN ORGANIZED HEALTH CARE EDUCATION/TRAINING PROGRAM

## 2020-10-30 PROCEDURE — 85652 RBC SED RATE AUTOMATED: CPT | Performed by: STUDENT IN AN ORGANIZED HEALTH CARE EDUCATION/TRAINING PROGRAM

## 2020-10-30 PROCEDURE — 99204 OFFICE O/P NEW MOD 45 MIN: CPT | Performed by: STUDENT IN AN ORGANIZED HEALTH CARE EDUCATION/TRAINING PROGRAM

## 2020-10-30 PROCEDURE — 99N1036 PR STATISTIC TOTAL PROTEIN: Performed by: STUDENT IN AN ORGANIZED HEALTH CARE EDUCATION/TRAINING PROGRAM

## 2020-10-30 ASSESSMENT — PAIN SCALES - GENERAL: PAINLEVEL: MILD PAIN (3)

## 2020-10-30 ASSESSMENT — MIFFLIN-ST. JEOR: SCORE: 1910.28

## 2020-10-30 NOTE — LETTER
10/30/2020      RE: Juanita Gonzalez  [id number]  [facility] [id number]       RHEUMATOLOGY INITIAL CONSULT NOTE    Chief Complaint:    Chief Complaint   Patient presents with     Consult     Elevated CRP       Reason for consult: Tricia Rodriguez from  has requested consultation for this patient for elevated CRP    History of Present Illness:  Ms Juanita Gonzalez is a 26 yo female with pmhx of enchondroma, asthma, urticaria referred to rheumatology for elevated CRP.     She has a hx of intermittent swelling/redness over her skin along with myalgias for which she has presented to  before. She has been evaluated by allergy/immunology in Nov 2019 for hx of urticaria and angioedema. W/u at that time returned negative for C1 esterase inhibitor and complement levels.  Her hives and angioedema started ~2 years ago, she does not remember any inciting triggers. Her skin testing was positive for sensitazition for trees and molds, but allergy/immunology does not feel this is the reason for her symptoms. At this time, the etiology of her urticaria and angioedema are unclear. Her urticaria/UE rash comes on around every 2 months, sometimes its on one arm and sometimes both. She does not always have accompanying lip swelling with hit but she did over the summer. Last episode of angioedema was last year in the winter time. The rash is itchy and warm when it comes on and it gets bigger and stays for a few hours and then self-resolves. She does not take epi for the rash because the hives does not always affect her breathing. She has not been taking any medications for this. The way she describes her rash is diffuse erythema over the arm with pruritis but does not always have raised hives like areas. She describes her hives as small mosquito bite size lesions. One time, when she was in [place] she noticed that the small mosquito bite sized lesions coalesced into a big area of redness which was diagnosed as cellulitis.  More recently, she has noticed some greenish/bluish bruising in the inner arms which resolved within 1 day. It was not painful but maybe a little tender. Denies any rashes over her LEs, trunk, back or face. She denies any changes in her soaps, detergents, lotions. Denies any known allergies to foods. She has been trying to keep a log of what she is eating to see if she can figure out what is triggering these rashes but she has not found a pattern. She feels that her symptoms are very sporadic and fleeting. She does not take any medications there were prescribed to her.     She has been noticing bilateral wrist tenderness since ~2 years when she wakes up and some numbness/tingling in the middle of the night or early morning but she does not notice any worsening symptoms with the rash. She was advised to wear wrist splints which helped but she does not wear it all the time. Wrist pain is dull and gets better after a few minutes if she applies pressure and keeps it in a certain position. Pain is not there everyday. She also notes some knee pain that started around the same time which gets worse with climbing stairs or being on her feet for a few hours. Reports AM stiffness lasting 1-2 minutes which quickly resolves with stretching. Denies any joint swelling. Does not take any medications for pain. Denies any abdominal pain with the rash. Denies any eye inflammation or conjunctivitis with her rash, used to have conjunctivitis as a child. Denies recent flares of asthma.     Past Medical History:    Past Medical History:   Diagnosis Date     Allergic conjunctivitis      Anisometropia      Asthma      Attention deficit disorder with hyperactivity(314.01)     stopped medication 1 year ago     Myopia      Nonorganic enuresis     voiding dysfunction      Obesity, unspecified      OCD (obsessive compulsive disorder)      Wears contact lenses      Past Surgical History:   Past Surgical History:   Procedure Laterality Date      DENTAL SURGERY       GRAFT BONE TO FINGER  3/18/2013    Procedure: GRAFT BONE TO FINGER;  Right Second Metacarpal Curettage and Allograft;  Surgeon: Jeremiah Yu MD;  Location: [facility] OR     HC TOOTH EXTRACTION W/FORCEP       LE FORT ONE N/A 6/11/2015    Procedure: LE FORT ONE;  Surgeon: Vitaliy Yun DDS;  Location: [facility] OR     LE FORT ONE , OSTEOTOMY SAGITTAL SPLIT, COMBINED N/A 12/3/2014    Procedure: COMBINED LE FORT ONE, OSTEOTOMY SAGITTAL SPLIT;  Surgeon: Vitaliy Yun DDS;  Location: [facility] OR     TONGUE SURGERY  9/17/2014    tongue lesion removed     Family History:   Family History   Problem Relation Age of Onset     Asthma Mother      Obesity Father      Hypertension Maternal Grandmother      Cancer Maternal Grandfather      Hypertension Paternal Grandmother      Glaucoma Other         Mat Great Aunt/blindness     Glaucoma Maternal Aunt      Macular Degeneration No family hx of      Denies any family hx of SLE, Sjogren's syndrome, vasculitis, allergies/urticaria    Social History:   Social History     Socioeconomic History     Marital status: Single     Spouse name: Not on file     Number of children: Not on file     Years of education: Not on file     Highest education level: Not on file   Occupational History     Not on file   Social Needs     Financial resource strain: Not on file     Food insecurity     Worry: Not on file     Inability: Not on file     Transportation needs     Medical: Not on file     Non-medical: Not on file   Tobacco Use     Smoking status: Never Smoker     Smokeless tobacco: Never Used   Substance and Sexual Activity     Alcohol use: Yes     Comment: Occasional with friends.     Drug use: Not Currently     Types: Marijuana     Comment: occas marijuana     Sexual activity: Yes     Partners: Male     Birth control/protection: I.U.D.     Comment: GC/ chlamydia sent 12/21/16.   Lifestyle     Physical activity     Days per week: Not on file     Minutes per session: Not on file      Stress: Not on file   Relationships     Social connections     Talks on phone: Not on file     Gets together: Not on file     Attends Confucianist service: Not on file     Active member of club or organization: Not on file     Attends meetings of clubs or organizations: Not on file     Relationship status: Not on file     Intimate partner violence     Fear of current or ex partner: Not on file     Emotionally abused: Not on file     Physically abused: Not on file     Forced sexual activity: Not on file   Other Topics Concern     Parent/sibling w/ CABG, MI or angioplasty before 65F 55M? Not Asked   Social History Narrative    Lives with her mom. In college via [employer], technically a senior in high school     Tobacco use: none  Alcohol use: 5 drinks at a time, couple times a month  Smokes marijuana daily  Occupational hx: works as a phlebotomist  Sexual hx: denies current sexual activity, denies prior STIs    Allergies   Allergen Reactions     Bactrim Hives      Immunization History   Administered Date(s) Administered     DTAP (<7y) 1997, 01/28/1998, 03/30/1998, 01/20/1999, 10/23/2002     FLU 6-35 months 12/04/2006, 10/10/2007, 10/30/2008, 10/29/2009, 12/01/2010, 10/18/2011, 10/17/2012     HEPA 10/30/2008, 06/01/2009     HPV 06/01/2009, 10/29/2009, 03/29/2010     HPV Quadrivalent 06/01/2009, 10/29/2009, 03/29/2010     HepA-ped 2 Dose 10/30/2008, 06/01/2009     HepB 1997, 1997, 06/29/1998     Hib (PRP-T) 1997, 01/28/1998, 03/30/1998, 01/20/1999     Influenza (H1N1) 01/19/2010     Influenza (IIV3) PF 10/14/1998, 11/18/1998, 12/04/2006, 10/10/2007, 10/30/2008, 10/29/2009, 12/01/2010, 10/18/2011, 10/17/2012     Influenza Vaccine IM > 6 months Valent IIV4 12/05/2013, 01/15/2015, 10/13/2015, 11/30/2016, 11/02/2017, 12/06/2018, 10/21/2019     MMR 11/18/1998, 10/23/2002     Mantoux Tuberculin Skin Test 02/27/2019     Meningococcal (Menactra ) 06/01/2009, 10/14/2014     Meningococcal (Menveo ) 10/14/2014      Pneumo Conj 13-V (2010&after) 09/06/2012     Poliovirus, inactivated (IPV) 1997, 01/28/1998, 09/29/1999, 10/23/2002     TD (ADULT, 7+) 02/27/2019     TDAP Vaccine (Boostrix) 06/01/2009     Varicella 11/28/1998, 10/30/2008        Medications:  Current Outpatient Medications   Medication Sig Dispense Refill     albuterol (PROAIR HFA/PROVENTIL HFA/VENTOLIN HFA) 108 (90 Base) MCG/ACT inhaler Inhale 2 puffs into the lungs every 4 hours as needed for shortness of breath / dyspnea or wheezing 1 Inhaler 1     cetirizine (ZYRTEC) 10 MG tablet Take 1 tablet (10 mg) by mouth At Bedtime 30 tablet 5     EPINEPHrine (EPIPEN/ADRENACLICK/OR ANY BX GENERIC EQUIV) 0.3 MG/0.3ML injection 2-pack Inject 0.3 mLs (0.3 mg) into the muscle as needed for anaphylaxis 2 each 1     famotidine (PEPCID) 20 MG tablet Take 1 tablet (20 mg) by mouth 2 times daily 60 tablet 5     levonorgestrel (MIRENA, 52 MG,) 20 MCG/24HR IUD 1 device, intrauterine, for up to 5 years. 1 each 0     loratadine (CLARITIN) 10 MG tablet Take 1 tablet (10 mg) by mouth every morning 30 tablet 5     montelukast (SINGULAIR) 10 MG tablet Take 1 tablet (10 mg) by mouth At Bedtime 90 tablet 3       REVIEW OF SYSTEMS:  Constitutional: Denies fevers, chills, +fatigue, weight loss or night sweats  HEENT: +chronic headaches, blurry vision, redness, drainage, dry eyes, dry mouth, oral/nasal ulcers, hair loss/thinning  Dermatologic: +skin rash per HPI, denies Raynaud's  Respiratory: Denies cough, shortness of breath, pleurisy  Cardiovascular: Denies chest pain, edema, lightheadedness, +intermittent dizziness  Gastrointestinal: Denies heartburn, difficulty swallowing, abdominal pain, nausea, vomiting, +intermittent diarrhea which she attributes to caffeine intake and diet, denies constipation, hematochezia or melena  Genitourinary: Denies dysuria or hematuria  Musculoskeletal: see HPI  Neurologic: +occasional b/l wrist numbness/tingling, denies weakness, falls    PHYSICAL  "EXAMINATION:   Vital signs:    /71   Pulse 94   Ht 1.626 m (5' 4\")   Wt 117 kg (258 lb)   SpO2 98%   BMI 44.29 kg/m      Gen: alert, awake, NAD  Skin: warm, dry, no rashes today, no scarring noted  HEENT: EOMI, PERRL, MMM, no oral ulcers/lesions, no alopecia  CV: RRR, normal s1 and s2, no m/r/g  Pulm: CTAB, non-labored respirations, no c/r/w  GI: +BS, soft, no TTP  MSK: no synovitis of any joints, no effusions, very mild pain elicited with flexion of L wrist, no knee joint tenderness, no restriction in ROM of any joints  Neuro: A&O x3, no focal deficits  Psych: pleasant, cooperative    Labs:      RF/CCP  Recent Labs   Lab Test 07/21/20  1201   CCPIGG 1   RHF <7     JEREMY  Recent Labs   Lab Test 07/21/20  1201   BEATRIZ Negative     dsDNA  Recent Labs   Lab Test 07/21/20  1201   DNA <1     C3/C4  Recent Labs   Lab Test 07/21/20  1201 10/30/19  1205   F4PZZVN 151  --    S4JDFQE 48* 49     CBC  Recent Labs   Lab Test 07/21/20  1201 11/06/19 1916 09/17/19  1836   WBC 8.5 9.7 15.6*   RBC 4.66 4.66 4.87   HGB 12.1 12.0 12.3   HCT 37.1 37.7 38.7   MCV 80 81 80   RDW 16.1* 16.7* 16.3*    299 338   MCH 26.0* 25.8* 25.3*   MCHC 32.6 31.8 31.8   NEUTROPHIL 63.6 61.7 52.7   LYMPH 27.3 27.5 39.2   MONOCYTE 7.4 8.2 6.4   EOSINOPHIL 1.5 2.4 1.5   BASOPHIL 0.2 0.2 0.2   ANEU 5.4 6.0 8.2   ALYM 2.3 2.7 6.1*   BASIL 0.6 0.8 1.0   AEOS 0.1 0.2 0.2   ABAS 0.0 0.0 0.0     CMP  Recent Labs   Lab Test 07/21/20  1201 11/06/19 1953 09/17/19  1836 09/23/15  2338 06/11/13  1009    140 138 138  --    POTASSIUM 3.9 3.8 3.9 3.4  --    CHLORIDE 108 107 104 105  --    CO2 25 27 26 27  --    ANIONGAP 6 6 8 6  --    GLC 83 96 76 75 88   BUN 5* 8 10 5*  --    CR 0.66 0.69 0.75 0.70  --    GFRESTIMATED >90 >90 >90 >90  Non African American GFR Calc    --    GFRESTBLACK >90 >90 >90 >90  African American GFR Calc    --    CELENA 8.3* 8.8 8.9 8.2*  --    BILITOTAL 0.5 0.2 0.2  --   --    ALBUMIN 3.6 3.6 3.8  --   --    PROTTOTAL 7.3 7.4 " 7.5  --   --    ALKPHOS 88 94 85  --   --    AST 20 23 17  --   --    ALT 30 32 26  --   --      HgA1c  Recent Labs   Lab Test 02/11/15  0739   A1C 5.4     Calcium/VitaminD  Recent Labs   Lab Test 07/21/20  1201 11/06/19  1953 09/17/19  1836 04/30/14  1613 04/30/14  1613 06/11/13  1009   CELENA 8.3* 8.8 8.9   < >  --   --    VITDT  --   --   --   --  33 33    < > = values in this interval not displayed.     ESR/CRP  Recent Labs   Lab Test 07/21/20  1201 11/06/19  1953 11/06/19  1916 09/17/19  1836   SED 12 10  --  9   CRP 29.7*  --  37.2*  --      TSH/T4  Recent Labs   Lab Test 07/21/20  1201 10/30/19  1205 02/11/15  0739 06/11/13  1009   TSH 3.06 0.88 0.99 2.61   T4  --   --  1.00 0.80     Lipid Panel  Recent Labs   Lab Test 06/11/13  1009   CHOL 123   TRIG 62   HDL 36*   LDL 74   VLDL 12   CHOLHDLRATIO 3.4     Hepatitis B  Recent Labs   Lab Test 10/30/19  1538   HEPBANG Nonreactive     HIV Screening  Recent Labs   Lab Test 10/30/19  1538   HIAGAB Nonreactive     UA  Recent Labs   Lab Test 11/07/19  0849 09/17/19  1833 07/14/19  0946 04/10/18  1608 03/29/17  1449   COLOR Yellow Yellow Yellow Yellow Yellow   APPEARANCE Clear Clear Clear Clear Clear   URINEGLC Negative Negative Negative Negative Negative   URINEBILI Negative Negative Negative Negative Negative   SG 1.015 1.010 <=1.005 1.020 >1.030   URINEPH 5.5 5.5 7.0 7.0 5.5   PROTEIN Negative Negative Negative Negative 30*   UROBILINOGEN 0.2 0.2 0.2 0.2 0.2   NITRITE Negative Negative Negative Negative Negative   UBLD Trace* Negative Negative Trace* Large*   LEUKEST Negative Negative Negative Negative Negative   WBCU 0 - 5  --   --  0 - 5 O - 2   RBCU O - 2  --   --  O - 2 >100*   SQUAMOUSEPI Moderate*  --   --  Few Few   BACTERIA Few*  --   --   --   --    MUCOUS Present*  --   --   --   --      Urine Microscopic  Recent Labs   Lab Test 11/07/19  0849 04/10/18  1608 03/29/17  1449   WBCU 0 - 5 0 - 5 O - 2   RBCU O - 2 O - 2 >100*   SQUAMOUSEPI Moderate* Few Few  "  BACTERIA Few*  --   --    MUCOUS Present*  --   --      Component      Latest Ref Rng & Units 10/30/2019   I4Gwodwwyk Inhib Funct      >=41 % 111   A9Mozgnahy Inhibitor      21 - 39 mg/dL 35   Complement C4      15 - 50 mg/dL 49   Complement C1Q      109 - 242 ug/mL 145       Imaging:   Imaging reivewed    Assessment / Plan:    Ms Juanita Gonzalez is a 26 yo female with pmhx of enchondroma, asthma, urticaria referred to rheumatology for elevated CRP. Rheumatologic work-up initiated by PCP and returned negative for JEREMY screen, RF and CCP in July. Labs remarkable for elevated CRP. Ddx for her elevated CRP includes possible urticarial vasculitis given her hx of urticaria and angioedema - her complements have been wnl along with C1 esterase inhibitor and pattern of her urticarial rash which resolves within a few hours goes against urticarial vasculitis. Urticarial vasculitis typically causes LCV rash, which she does not describe. She does note some \"bruising\" recently but it does not last more than a day. She does not have any s/s of SLE or Sjogren's or any CTD. We discussed the differential in detail and overall low suspicion for this being rheumatologic. We discussed repeating her inflammatory markers today in addition to some other labs to complete the work-up. I encouraged her to follow-up with her allergist and take the medications prescribed to see if helps her symptoms. It would be beneficial to obtain skin biopsy if the rash is lasting longer than a day, to get the most information.     1. UE fleeting rash/hives  -check C, SPEP, UA, repeat CRP and ESR  -advised pt to contact PCP for derm evaluation when rash occurs  -follow-up with allergy/immunology    2. Intermittent b/l wrist pain/numbness  -does not sound inflammatory in nature  -no synovitis on exam today  -RF, CCP negative in July  -Plain films largely unremarkable in July  -recommended discussion with PCP and considering w/u for CTS  -encouraged trying " "wrist splints    3. B/l knee pain  -does not sound inflammatory in nature  -no joint tenderness/effusions   -advised weight loss  -PT referral    4. Headaches  -more constant per pt  -advised discussion with PCP for further evaluation    RTC pending labs        Juanitaprabhjot Gonzalez's goals for this visit include: Consult   She requests these members of her care team be copied on today's visit information:     PCP: Jackie Pires    Referring Provider:  No referring provider defined for this encounter.    /71   Pulse 94   Ht 1.626 m (5' 4\")   Wt 117 kg (258 lb)   SpO2 98%   BMI 44.29 kg/m      Do you need any medication refills at today's visit? No    Ivonne Reynolds MD  "

## 2020-10-30 NOTE — TELEPHONE ENCOUNTER
Called patient and left generic vm asking them to call the clinic when they received the message regarding labs. Clinic number provided.    RANJAN Smith, RN   Rheumatology Care Coordinator   [facility]       ----- Message from Carmela Reynolds MD sent at 10/30/2020 10:14 AM CDT -----  RN, Can you please call pt and ask if she is menstruating and/or has any dysuria, urinary frequency, urgency. She also has a lot of squamous cells in the sample so would be a good idea to repeat the sample. Thanks.

## 2020-10-30 NOTE — RESULT ENCOUNTER NOTE
RN, Can you please call pt and ask if she is menstruating and/or has any dysuria, urinary frequency, urgency. She also has a lot of squamous cells in the sample so would be a good idea to repeat the sample. Thanks.

## 2020-10-30 NOTE — PATIENT INSTRUCTIONS
1. Please get blood work and urine test today on the 1st floor  2. Please call your primary care when you have another episode of rash so they can try and get you to a skin specialist   3. Please discuss your headaches with your primary care doctor  4. Please make an appointment with your allergist  5. Make an appointment with physical therapy  6. Return to clinic pending labs

## 2020-10-30 NOTE — PROGRESS NOTES
"Juanita Gonzalez's goals for this visit include: Consult   She requests these members of her care team be copied on today's visit information:     PCP: Jackie Pires    Referring Provider:  No referring provider defined for this encounter.    /71   Pulse 94   Ht 1.626 m (5' 4\")   Wt 117 kg (258 lb)   SpO2 98%   BMI 44.29 kg/m      Do you need any medication refills at today's visit? No    Ivonne Jackson CMA    "

## 2020-11-01 LAB — HCV AB SERPL QL IA: NONREACTIVE

## 2020-11-02 LAB
ALBUMIN SERPL ELPH-MCNC: 4.1 G/DL (ref 3.7–5.1)
ALPHA1 GLOB SERPL ELPH-MCNC: 0.4 G/DL (ref 0.2–0.4)
ALPHA2 GLOB SERPL ELPH-MCNC: 0.9 G/DL (ref 0.5–0.9)
B-GLOBULIN SERPL ELPH-MCNC: 0.9 G/DL (ref 0.6–1)
GAMMA GLOB SERPL ELPH-MCNC: 1.2 G/DL (ref 0.7–1.6)
M PROTEIN SERPL ELPH-MCNC: 0 G/DL
PROT PATTERN SERPL ELPH-IMP: NORMAL

## 2020-11-09 ENCOUNTER — OFFICE VISIT - HEALTHEAST (OUTPATIENT)
Dept: FAMILY MEDICINE | Facility: CLINIC | Age: 25
End: 2020-11-09

## 2020-11-09 DIAGNOSIS — J45.21 MILD INTERMITTENT ASTHMA WITH EXACERBATION: ICD-10-CM

## 2020-11-09 DIAGNOSIS — J02.9 SORE THROAT: ICD-10-CM

## 2020-11-09 DIAGNOSIS — E66.01 MORBID OBESITY (H): ICD-10-CM

## 2020-11-09 DIAGNOSIS — Z20.822 EXPOSURE TO COVID-19 VIRUS: ICD-10-CM

## 2020-11-10 ENCOUNTER — COMMUNICATION - HEALTHEAST (OUTPATIENT)
Dept: SCHEDULING | Facility: CLINIC | Age: 25
End: 2020-11-10

## 2020-11-11 ENCOUNTER — COMMUNICATION - HEALTHEAST (OUTPATIENT)
Dept: SCHEDULING | Facility: CLINIC | Age: 25
End: 2020-11-11

## 2020-11-13 NOTE — TELEPHONE ENCOUNTER
Mychart was sent by Dr. Reynolds. Confirmed patient reviewed mychart.     PAULETTE SmithN, RN   Rheumatology Care Coordinator   [facility]

## 2020-11-14 ENCOUNTER — APPOINTMENT (OUTPATIENT)
Dept: GENERAL RADIOLOGY | Facility: CLINIC | Age: 25
End: 2020-11-14
Attending: INTERNAL MEDICINE
Payer: COMMERCIAL

## 2020-11-14 ENCOUNTER — ANCILLARY PROCEDURE (OUTPATIENT)
Dept: ULTRASOUND IMAGING | Facility: CLINIC | Age: 25
End: 2020-11-14
Attending: INTERNAL MEDICINE
Payer: COMMERCIAL

## 2020-11-14 ENCOUNTER — HOSPITAL ENCOUNTER (EMERGENCY)
Facility: CLINIC | Age: 25
Discharge: HOME | End: 2020-11-14
Attending: INTERNAL MEDICINE | Admitting: INTERNAL MEDICINE
Payer: COMMERCIAL

## 2020-11-14 VITALS
TEMPERATURE: 98.7 F | SYSTOLIC BLOOD PRESSURE: 122 MMHG | WEIGHT: 250 LBS | OXYGEN SATURATION: 98 % | DIASTOLIC BLOOD PRESSURE: 84 MMHG | BODY MASS INDEX: 42.91 KG/M2 | HEART RATE: 92 BPM | RESPIRATION RATE: 18 BRPM

## 2020-11-14 DIAGNOSIS — R06.00 DYSPNEA, UNSPECIFIED TYPE: ICD-10-CM

## 2020-11-14 DIAGNOSIS — U07.1 2019 NOVEL CORONAVIRUS DISEASE (COVID-19): ICD-10-CM

## 2020-11-14 DIAGNOSIS — J45.909 MILD ASTHMA WITHOUT COMPLICATION, UNSPECIFIED WHETHER PERSISTENT: ICD-10-CM

## 2020-11-14 LAB
ALBUMIN SERPL-MCNC: 4.1 G/DL (ref 3.4–5)
ALBUMIN UR-MCNC: NEGATIVE MG/DL
ALP SERPL-CCNC: 92 U/L (ref 40–150)
ALT SERPL W P-5'-P-CCNC: 31 U/L (ref 0–50)
ANION GAP SERPL CALCULATED.3IONS-SCNC: 5 MMOL/L (ref 3–14)
APPEARANCE UR: CLEAR
AST SERPL W P-5'-P-CCNC: 20 U/L (ref 0–45)
BASOPHILS # BLD AUTO: 0 10E9/L (ref 0–0.2)
BASOPHILS NFR BLD AUTO: 0.4 %
BILIRUB SERPL-MCNC: 0.2 MG/DL (ref 0.2–1.3)
BILIRUB UR QL STRIP: NEGATIVE
BUN SERPL-MCNC: 4 MG/DL (ref 7–30)
CALCIUM SERPL-MCNC: 9.4 MG/DL (ref 8.5–10.1)
CHLORIDE SERPL-SCNC: 107 MMOL/L (ref 94–109)
CO2 SERPL-SCNC: 27 MMOL/L (ref 20–32)
COLOR UR AUTO: ABNORMAL
CREAT SERPL-MCNC: 0.78 MG/DL (ref 0.52–1.04)
CRP SERPL-MCNC: 13 MG/L (ref 0–8)
D DIMER PPP FEU-MCNC: <0.3 UG/ML FEU (ref 0–0.5)
DIFFERENTIAL METHOD BLD: ABNORMAL
EOSINOPHIL # BLD AUTO: 0 10E9/L (ref 0–0.7)
EOSINOPHIL NFR BLD AUTO: 0.4 %
ERYTHROCYTE [DISTWIDTH] IN BLOOD BY AUTOMATED COUNT: 14.3 % (ref 10–15)
ERYTHROCYTE [SEDIMENTATION RATE] IN BLOOD BY WESTERGREN METHOD: 8 MM/H (ref 0–20)
GFR SERPL CREATININE-BSD FRML MDRD: >90 ML/MIN/{1.73_M2}
GLUCOSE SERPL-MCNC: 81 MG/DL (ref 70–99)
GLUCOSE UR STRIP-MCNC: NEGATIVE MG/DL
HCG SERPL QL: NEGATIVE
HCT VFR BLD AUTO: 44.4 % (ref 35–47)
HGB BLD-MCNC: 14.1 G/DL (ref 11.7–15.7)
HGB UR QL STRIP: NEGATIVE
IMM GRANULOCYTES # BLD: 0 10E9/L (ref 0–0.4)
IMM GRANULOCYTES NFR BLD: 0.2 %
INR PPP: 1 (ref 0.86–1.14)
KETONES UR STRIP-MCNC: NEGATIVE MG/DL
LEUKOCYTE ESTERASE UR QL STRIP: ABNORMAL
LYMPHOCYTES # BLD AUTO: 1.9 10E9/L (ref 0.8–5.3)
LYMPHOCYTES NFR BLD AUTO: 40 %
MCH RBC QN AUTO: 26.2 PG (ref 26.5–33)
MCHC RBC AUTO-ENTMCNC: 31.8 G/DL (ref 31.5–36.5)
MCV RBC AUTO: 83 FL (ref 78–100)
MONOCYTES # BLD AUTO: 0.4 10E9/L (ref 0–1.3)
MONOCYTES NFR BLD AUTO: 8.8 %
MUCOUS THREADS #/AREA URNS LPF: PRESENT /LPF
NEUTROPHILS # BLD AUTO: 2.3 10E9/L (ref 1.6–8.3)
NEUTROPHILS NFR BLD AUTO: 50.2 %
NITRATE UR QL: NEGATIVE
NRBC # BLD AUTO: 0 10*3/UL
NRBC BLD AUTO-RTO: 0 /100
NT-PROBNP SERPL-MCNC: <5 PG/ML (ref 0–450)
PH UR STRIP: 5.5 PH (ref 5–7)
PLATELET # BLD AUTO: 232 10E9/L (ref 150–450)
POTASSIUM SERPL-SCNC: 3.8 MMOL/L (ref 3.4–5.3)
PROT SERPL-MCNC: 8.1 G/DL (ref 6.8–8.8)
RBC # BLD AUTO: 5.38 10E12/L (ref 3.8–5.2)
RBC #/AREA URNS AUTO: 2 /HPF (ref 0–2)
SODIUM SERPL-SCNC: 139 MMOL/L (ref 133–144)
SOURCE: ABNORMAL
SP GR UR STRIP: 1.01 (ref 1–1.03)
SQUAMOUS #/AREA URNS AUTO: 3 /HPF (ref 0–1)
TROPONIN I SERPL-MCNC: <0.015 UG/L (ref 0–0.04)
UROBILINOGEN UR STRIP-MCNC: NORMAL MG/DL (ref 0–2)
WBC # BLD AUTO: 4.7 10E9/L (ref 4–11)
WBC #/AREA URNS AUTO: 2 /HPF (ref 0–5)

## 2020-11-14 PROCEDURE — 85610 PROTHROMBIN TIME: CPT | Performed by: INTERNAL MEDICINE

## 2020-11-14 PROCEDURE — 84703 CHORIONIC GONADOTROPIN ASSAY: CPT | Performed by: INTERNAL MEDICINE

## 2020-11-14 PROCEDURE — 99285 EMERGENCY DEPT VISIT HI MDM: CPT | Mod: 25 | Performed by: INTERNAL MEDICINE

## 2020-11-14 PROCEDURE — 93005 ELECTROCARDIOGRAM TRACING: CPT | Performed by: INTERNAL MEDICINE

## 2020-11-14 PROCEDURE — 85652 RBC SED RATE AUTOMATED: CPT | Performed by: INTERNAL MEDICINE

## 2020-11-14 PROCEDURE — 250N000011 HC RX IP 250 OP 636: Performed by: INTERNAL MEDICINE

## 2020-11-14 PROCEDURE — 85379 FIBRIN DEGRADATION QUANT: CPT | Performed by: INTERNAL MEDICINE

## 2020-11-14 PROCEDURE — 85025 COMPLETE CBC W/AUTO DIFF WBC: CPT | Performed by: INTERNAL MEDICINE

## 2020-11-14 PROCEDURE — 81001 URINALYSIS AUTO W/SCOPE: CPT | Performed by: INTERNAL MEDICINE

## 2020-11-14 PROCEDURE — 93010 ELECTROCARDIOGRAM REPORT: CPT | Mod: 59 | Performed by: INTERNAL MEDICINE

## 2020-11-14 PROCEDURE — 83880 ASSAY OF NATRIURETIC PEPTIDE: CPT | Performed by: INTERNAL MEDICINE

## 2020-11-14 PROCEDURE — 86140 C-REACTIVE PROTEIN: CPT | Performed by: INTERNAL MEDICINE

## 2020-11-14 PROCEDURE — 71045 X-RAY EXAM CHEST 1 VIEW: CPT | Mod: 26 | Performed by: RADIOLOGY

## 2020-11-14 PROCEDURE — 71045 X-RAY EXAM CHEST 1 VIEW: CPT

## 2020-11-14 PROCEDURE — 258N000003 HC RX IP 258 OP 636: Performed by: INTERNAL MEDICINE

## 2020-11-14 PROCEDURE — 96361 HYDRATE IV INFUSION ADD-ON: CPT | Performed by: INTERNAL MEDICINE

## 2020-11-14 PROCEDURE — 76604 US EXAM CHEST: CPT | Mod: 26 | Performed by: INTERNAL MEDICINE

## 2020-11-14 PROCEDURE — 80053 COMPREHEN METABOLIC PANEL: CPT | Performed by: INTERNAL MEDICINE

## 2020-11-14 PROCEDURE — 76604 US EXAM CHEST: CPT | Performed by: INTERNAL MEDICINE

## 2020-11-14 PROCEDURE — 84484 ASSAY OF TROPONIN QUANT: CPT | Performed by: INTERNAL MEDICINE

## 2020-11-14 PROCEDURE — 96374 THER/PROPH/DIAG INJ IV PUSH: CPT | Performed by: INTERNAL MEDICINE

## 2020-11-14 RX ORDER — SODIUM CHLORIDE 9 MG/ML
INJECTION, SOLUTION INTRAVENOUS CONTINUOUS
Status: DISCONTINUED | OUTPATIENT
Start: 2020-11-14 | End: 2020-11-14 | Stop reason: HOSPADM

## 2020-11-14 RX ORDER — ONDANSETRON 2 MG/ML
4 INJECTION INTRAMUSCULAR; INTRAVENOUS ONCE
Status: COMPLETED | OUTPATIENT
Start: 2020-11-14 | End: 2020-11-14

## 2020-11-14 RX ORDER — ALBUTEROL SULFATE 0.83 MG/ML
2.5 SOLUTION RESPIRATORY (INHALATION) EVERY 4 HOURS PRN
Qty: 1 BOX | Refills: 0 | Status: SHIPPED | OUTPATIENT
Start: 2020-11-14 | End: 2020-12-14

## 2020-11-14 RX ORDER — ONDANSETRON 4 MG/1
4 TABLET, ORALLY DISINTEGRATING ORAL EVERY 8 HOURS PRN
Qty: 10 TABLET | Refills: 0 | Status: SHIPPED | OUTPATIENT
Start: 2020-11-14 | End: 2020-11-17

## 2020-11-14 RX ADMIN — ONDANSETRON 4 MG: 2 INJECTION INTRAMUSCULAR; INTRAVENOUS at 11:26

## 2020-11-14 RX ADMIN — SODIUM CHLORIDE 1000 ML: 9 INJECTION, SOLUTION INTRAVENOUS at 11:27

## 2020-11-14 ASSESSMENT — ENCOUNTER SYMPTOMS
DIFFICULTY URINATING: 0
ABDOMINAL PAIN: 0
ARTHRALGIAS: 0
NAUSEA: 0
NECK STIFFNESS: 0
EYE REDNESS: 0
HEADACHES: 0
SHORTNESS OF BREATH: 0
COUGH: 0
DIARRHEA: 0
CHEST TIGHTNESS: 1
COLOR CHANGE: 0
CONFUSION: 0
VOMITING: 0
FEVER: 0

## 2020-11-14 NOTE — ED NOTES
Initial Assessment: VSS. Communicates needs without difficulty. Pleasant and co-op.Pt has c/o cough and some SOB. Pt is known Covid (+). Denies chest/shoulder/arm pain or discomfort. No acute distress noted. Family at the bedside in supportive role.

## 2020-11-14 NOTE — ED PROVIDER NOTES
[place] EMERGENCY DEPARTMENT ([facility])  November 14, 2020  History     Chief Complaint   Patient presents with     Shortness of Breath     Covid Concern     The history is provided by the patient and medical records.     Juanita Gonzalez is a 25 year old female with history of asthma who is astaff member at [employer] who tested positive for COVID-19 on Monday who presents with 2 day worsening of shortness of breath, cough, and chest tightness.  She has had no relief with inhaler.  She denies wheezing.  No fever, nausea, vomiting, diarrhea.    PAST MEDICAL HISTORY:   Past Medical History:   Diagnosis Date     Allergic conjunctivitis      Anisometropia      Asthma      Attention deficit disorder with hyperactivity(314.01)     stopped medication 1 year ago     Myopia      Nonorganic enuresis     voiding dysfunction      Obesity, unspecified      OCD (obsessive compulsive disorder)      Wears contact lenses        PAST SURGICAL HISTORY:   Past Surgical History:   Procedure Laterality Date     DENTAL SURGERY       GRAFT BONE TO FINGER  3/18/2013    Procedure: GRAFT BONE TO FINGER;  Right Second Metacarpal Curettage and Allograft;  Surgeon: Jeremiah Yu MD;  [facility] OR     HC TOOTH EXTRACTION W/FORCEP       LE FORT ONE N/A 6/11/2015    Procedure: LE FORT ONE;  Surgeon: Vitaliy Yun DDS;  Location: [facility] OR     LE FORT ONE , OSTEOTOMY SAGITTAL SPLIT, COMBINED N/A 12/3/2014    Procedure: COMBINED LE FORT ONE, OSTEOTOMY SAGITTAL SPLIT;  Surgeon: Vitaliy Yun DDS;  Location: [facility] OR     TONGUE SURGERY  9/17/2014    tongue lesion removed       Past medical history, past surgical history, medications, and allergies were reviewed with the patient. Additional pertinent items: None    FAMILY HISTORY:   Family History   Problem Relation Age of Onset     Asthma Mother      Obesity Father      Hypertension Maternal Grandmother      Cancer Maternal Grandfather      Hypertension Paternal  Grandmother      Glaucoma Other         Mat Great Aunt/blindness     Glaucoma Maternal Aunt      Macular Degeneration No family hx of        SOCIAL HISTORY:   Social History     Tobacco Use     Smoking status: Never Smoker     Smokeless tobacco: Never Used   Substance Use Topics     Alcohol use: Yes     Comment: Occasional with friends.     Social history was reviewed with the patient. Additional pertinent items: None      Discharge Medication List as of 11/14/2020  1:35 PM      START taking these medications    Details   albuterol (PROVENTIL) (2.5 MG/3ML) 0.083% neb solution Take 1 vial (2.5 mg) by nebulization every 4 hours as needed for shortness of breath / dyspnea, Disp-1 Box, R-0, Local Print      fluticasone-salmeterol (ADVAIR DISKUS) 250-50 MCG/DOSE inhaler Inhale 1 puff into the lungs 2 times daily, Disp-1 Inhaler, R-0, Local Print         CONTINUE these medications which have NOT CHANGED    Details   cetirizine (ZYRTEC) 10 MG tablet Take 1 tablet (10 mg) by mouth At Bedtime, Disp-30 tablet,R-5, E-Prescribe      montelukast (SINGULAIR) 10 MG tablet Take 1 tablet (10 mg) by mouth At Bedtime, Disp-90 tablet,R-3, E-Prescribe      EPINEPHrine (EPIPEN/ADRENACLICK/OR ANY BX GENERIC EQUIV) 0.3 MG/0.3ML injection 2-pack Inject 0.3 mLs (0.3 mg) into the muscle as needed for anaphylaxis, Disp-2 each, R-1, E-Prescribe      famotidine (PEPCID) 20 MG tablet Take 1 tablet (20 mg) by mouth 2 times daily, Disp-60 tablet,R-5, E-Prescribe      levonorgestrel (MIRENA, 52 MG,) 20 MCG/24HR IUD 1 device, intrauterine, for up to 5 years.Disp-1 each, R-0No Print [facility] [address]666-823-09  LOT # [id number]  EXP Date Jan 2022      loratadine (CLARITIN) 10 MG tablet Take 1 tablet (10 mg) by mouth every morning, Disp-30 tablet, R-5, E-Prescribe         STOP taking these medications       albuterol (PROAIR HFA/PROVENTIL HFA/VENTOLIN HFA) 108 (90 Base) MCG/ACT inhaler Comments:   Reason for Stopping:                  Allergies   Allergen  Reactions     Bactrim Hives        Review of Systems   Constitutional: Negative for fever.   HENT: Negative for congestion.    Eyes: Negative for redness.   Respiratory: Positive for chest tightness. Negative for cough and shortness of breath.    Cardiovascular: Negative for chest pain.   Gastrointestinal: Negative for abdominal pain, diarrhea, nausea and vomiting.   Genitourinary: Negative for difficulty urinating.   Musculoskeletal: Negative for arthralgias and neck stiffness.   Skin: Negative for color change.   Neurological: Negative for headaches.   Psychiatric/Behavioral: Negative for confusion.   All other systems reviewed and are negative.    A complete review of systems was performed with pertinent positives and negatives noted in the HPI, and all other systems negative.    Physical Exam   BP: 138/77  Pulse: 110  Temp: 98.7  F (37.1  C)  Resp: 18  Weight: 113.4 kg (250 lb)  SpO2: 98 %      Physical Exam  Constitutional:       General: She is not in acute distress.     Appearance: She is not diaphoretic.   HENT:      Head: Atraumatic.      Mouth/Throat:      Mouth: Mucous membranes are dry.      Pharynx: No oropharyngeal exudate.   Eyes:      General: No scleral icterus.     Pupils: Pupils are equal, round, and reactive to light.   Neck:      Musculoskeletal: Neck supple.   Cardiovascular:      Rate and Rhythm: Normal rate and regular rhythm.      Heart sounds: Normal heart sounds. No murmur. No friction rub. No gallop.    Pulmonary:      Effort: No respiratory distress.      Breath sounds: No stridor. Wheezing (minimal) present. No rhonchi or rales.   Chest:      Chest wall: No tenderness.   Abdominal:      General: Abdomen is flat. Bowel sounds are normal. There is no distension.      Palpations: Abdomen is soft. There is no mass.      Tenderness: There is no abdominal tenderness. There is no right CVA tenderness, left CVA tenderness, guarding or rebound.      Hernia: No hernia is present.    Musculoskeletal:         General: No tenderness.   Skin:     General: Skin is warm.      Findings: No rash.   Neurological:      General: No focal deficit present.         ED Course        Procedures  Results for orders placed during the hospital encounter of 11/14/20   POC US CHEST B-SCAN    Impression [facility] Procedure Note      Limited Bedside ED Ultrasound of Thorax:    PROCEDURE: PERFORMED BY: Dr. Talia White MD, MD  INDICATIONS/SYMPTOM:  shortness of breath  PROBE: High frequency linear probe  BODY LOCATION: Chest  FINDINGS:  Images of both lung hemithoracies taken in 2D in multiple rib spaces        Right side:  Lung sliding artifact  Present     Comet tail artifacts  Present   Left side:  Lung sliding artifact  Present     Comet tail artifacts  Present   Hemothorax: Right side Absent     Left side Absent   Pleural effusion: Right side Absent      Left side Absent    INTERPRETATION: The exam was normal. There was no free fluid identified in the chest cavity. No evidence of pneumothorax, hemothorax or pleural effusion.  IMAGE DOCUMENTATION: Images were archived to PACs system.               EKG Interpretation:      Interpreted by Talia White MD, MD  Time reviewed: on arrival  Symptoms at time of EKG: sob   Rhythm: normal sinus   Rate: normal  Axis: normal  Ectopy: none  Conduction: normal  ST Segments/ T Waves: No ST-T wave changes  Q Waves: none  Comparison to prior: Unchanged from 9/2015    Clinical Impression: normal EKG                        Results for orders placed or performed during the hospital encounter of 11/14/20 (from the past 24 hour(s))   EKG 12-lead, tracing only   Result Value Ref Range    Interpretation ECG Click View Image link to view waveform and result    XR Chest Port 1 View    Narrative    EXAM: XR CHEST PORT 1 VW  11/14/2020 11:01 AM      HISTORY: sob covid    COMPARISON: 8/8/2018     TECHNIQUE: Frontal view of the chest.    FINDINGS: Unremarkable soft tissues and no  acute bony abnormalities.  Cardiomediastinal borders are clear. No enlargement of the cardiac  silhouette. No appreciable pneumothorax or pleural effusions. No  definite focal airspace opacities. Haziness in the lateral lower lobes  may represent overlying soft tissue/exposure technique versus  infection.      Impression    IMPRESSION: Haziness in the lateral lower lobes favors infection,  although may be artifactual.    I have personally reviewed the examination and initial interpretation  and I agree with the findings.    CASSIUS HUTCHINSON MD   POC US CHEST B-SCAN    Impression    [facility] Procedure Note      Limited Bedside ED Ultrasound of Thorax:    PROCEDURE: PERFORMED BY: Dr. Talia White MD, MD  INDICATIONS/SYMPTOM:  shortness of breath  PROBE: High frequency linear probe  BODY LOCATION: Chest  FINDINGS:  Images of both lung hemithoracies taken in 2D in multiple rib spaces        Right side:  Lung sliding artifact  Present     Comet tail artifacts  Present   Left side:  Lung sliding artifact  Present     Comet tail artifacts  Present   Hemothorax: Right side Absent     Left side Absent   Pleural effusion: Right side Absent      Left side Absent    INTERPRETATION: The exam was normal. There was no free fluid identified in the chest cavity. No evidence of pneumothorax, hemothorax or pleural effusion.  IMAGE DOCUMENTATION: Images were archived to PACs system.     CBC with platelets differential   Result Value Ref Range    WBC 4.7 4.0 - 11.0 10e9/L    RBC Count 5.38 (H) 3.8 - 5.2 10e12/L    Hemoglobin 14.1 11.7 - 15.7 g/dL    Hematocrit 44.4 35.0 - 47.0 %    MCV 83 78 - 100 fl    MCH 26.2 (L) 26.5 - 33.0 pg    MCHC 31.8 31.5 - 36.5 g/dL    RDW 14.3 10.0 - 15.0 %    Platelet Count 232 150 - 450 10e9/L    Diff Method Automated Method     % Neutrophils 50.2 %    % Lymphocytes 40.0 %    % Monocytes 8.8 %    % Eosinophils 0.4 %    % Basophils 0.4 %    % Immature Granulocytes 0.2 %    Nucleated RBCs 0 0 /100     Absolute Neutrophil 2.3 1.6 - 8.3 10e9/L    Absolute Lymphocytes 1.9 0.8 - 5.3 10e9/L    Absolute Monocytes 0.4 0.0 - 1.3 10e9/L    Absolute Eosinophils 0.0 0.0 - 0.7 10e9/L    Absolute Basophils 0.0 0.0 - 0.2 10e9/L    Abs Immature Granulocytes 0.0 0 - 0.4 10e9/L    Absolute Nucleated RBC 0.0    D dimer quantitative   Result Value Ref Range    D Dimer <0.3 0.0 - 0.50 ug/ml FEU   INR   Result Value Ref Range    INR 1.00 0.86 - 1.14   Comprehensive metabolic panel   Result Value Ref Range    Sodium 139 133 - 144 mmol/L    Potassium 3.8 3.4 - 5.3 mmol/L    Chloride 107 94 - 109 mmol/L    Carbon Dioxide 27 20 - 32 mmol/L    Anion Gap 5 3 - 14 mmol/L    Glucose 81 70 - 99 mg/dL    Urea Nitrogen 4 (L) 7 - 30 mg/dL    Creatinine 0.78 0.52 - 1.04 mg/dL    GFR Estimate >90 >60 mL/min/[1.73_m2]    GFR Estimate If Black >90 >60 mL/min/[1.73_m2]    Calcium 9.4 8.5 - 10.1 mg/dL    Bilirubin Total 0.2 0.2 - 1.3 mg/dL    Albumin 4.1 3.4 - 5.0 g/dL    Protein Total 8.1 6.8 - 8.8 g/dL    Alkaline Phosphatase 92 40 - 150 U/L    ALT 31 0 - 50 U/L    AST 20 0 - 45 U/L   Troponin I   Result Value Ref Range    Troponin I ES <0.015 0.000 - 0.045 ug/L   Nt probnp inpatient (BNP)   Result Value Ref Range    N-Terminal Pro BNP Inpatient <5 0 - 450 pg/mL   CRP inflammation   Result Value Ref Range    CRP Inflammation 13.0 (H) 0.0 - 8.0 mg/L   Erythrocyte sedimentation rate auto   Result Value Ref Range    Sed Rate 8 0 - 20 mm/h   HCG qualitative pregnancy (blood)   Result Value Ref Range    HCG Qualitative Serum Negative NEG^Negative   UA reflex to Microscopic and Culture    Specimen: Urine clean catch   Result Value Ref Range    Color Urine Light Yellow     Appearance Urine Clear     Glucose Urine Negative NEG^Negative mg/dL    Bilirubin Urine Negative NEG^Negative    Ketones Urine Negative NEG^Negative mg/dL    Specific Gravity Urine 1.006 1.003 - 1.035    Blood Urine Negative NEG^Negative    pH Urine 5.5 5.0 - 7.0 pH    Protein Albumin  Urine Negative NEG^Negative mg/dL    Urobilinogen mg/dL Normal 0.0 - 2.0 mg/dL    Nitrite Urine Negative NEG^Negative    Leukocyte Esterase Urine Trace (A) NEG^Negative    Source Clean catch urine     RBC Urine 2 0 - 2 /HPF    WBC Urine 2 0 - 5 /HPF    Squamous Epithelial /HPF Urine 3 (H) 0 - 1 /HPF    Mucous Urine Present (A) NEG^Negative /LPF     Medications   0.9% sodium chloride BOLUS (0 mLs Intravenous Stopped 11/14/20 1334)     Followed by   sodium chloride 0.9% infusion (has no administration in time range)   ondansetron (ZOFRAN) injection 4 mg (4 mg Intravenous Given 11/14/20 1126)             Assessments & Plan (with Medical Decision Making)    Dyspnea with COVID but no hypoxia or HD stable initially tachycardic with possibly anxiety but HR down to 80's, EKG trop bnp d dimer and other labs all good, CXR and POCUS neg, mild asthma-albuterol neb and advair for now, no systemic steroid, follow up with her PMD this week. If any worse, to return to ED.         I have reviewed the nursing notes.    I have reviewed the findings, diagnosis, plan and need for follow up with the patient.    Discharge Medication List as of 11/14/2020  1:35 PM      START taking these medications    Details   albuterol (PROVENTIL) (2.5 MG/3ML) 0.083% neb solution Take 1 vial (2.5 mg) by nebulization every 4 hours as needed for shortness of breath / dyspnea, Disp-1 Box, R-0, Local Print      fluticasone-salmeterol (ADVAIR DISKUS) 250-50 MCG/DOSE inhaler Inhale 1 puff into the lungs 2 times daily, Disp-1 Inhaler, R-0, Local Print             Final diagnoses:   Dyspnea, unspecified type   2019 novel coronavirus disease (COVID-19)   Mild asthma without complication, unspecified whether persistent   Sue LOPEZ, am serving as a trained medical scribe to document services personally performed by Talia White MD based on the provider's statements to me on November 14, 2020.  This document has been checked and approved by the attending  provider.    I, Talia White MD, was physically present and have reviewed and verified the accuracy of this note documented by Sue Be, medical scribe.       11/14/2020   [facility] ED EMERGENCY DEPARTMENT     Talia White MD  11/14/20 [id number]

## 2020-11-14 NOTE — ED AVS SNAPSHOT
[facility] Emergency Department  [address] [address]  Phone: [phone]                                    Juanita Gonzalez   MRN: [id number]    Department: [facility] Emergency Department   Date of Visit: 11/14/2020           After Visit Summary Signature Page    I have received my discharge instructions, and my questions have been answered. I have discussed any challenges I see with this plan with the nurse or doctor.    ..........................................................................................................................................  Patient/Patient Representative Signature      ..........................................................................................................................................  Patient Representative Print Name and Relationship to Patient    ..................................................               ................................................  Date                                   Time    ..........................................................................................................................................  Reviewed by Signature/Title    ...................................................              ..............................................  Date                                               Time          22EPIC Rev 08/18

## 2020-11-14 NOTE — ED TRIAGE NOTES
Patient arrives ambulatory to triage c/c SOB increasing starting last yesterday. Patient is COVID+. Patient has been using her inhaler w/o improvement of symptoms. Patient does not appear to be in distress, breathing regular and unlabored, speaking in full sentences.

## 2020-11-15 LAB — INTERPRETATION ECG - MUSE: NORMAL

## 2020-12-14 ENCOUNTER — OFFICE VISIT (OUTPATIENT)
Dept: DERMATOLOGY | Facility: CLINIC | Age: 25
End: 2020-12-14
Payer: COMMERCIAL

## 2020-12-14 DIAGNOSIS — L81.9 HYPERPIGMENTATION OF SKIN: Primary | ICD-10-CM

## 2020-12-14 DIAGNOSIS — L70.0 ACNE VULGARIS: ICD-10-CM

## 2020-12-14 PROCEDURE — 99203 OFFICE O/P NEW LOW 30 MIN: CPT | Performed by: PHYSICIAN ASSISTANT

## 2020-12-14 RX ORDER — TRETINOIN 0.25 MG/G
CREAM TOPICAL
Qty: 45 G | Refills: 3 | Status: SHIPPED | OUTPATIENT
Start: 2020-12-14 | End: 2023-01-17

## 2020-12-14 RX ORDER — HYDROQUINONE 40 MG/G
CREAM TOPICAL
Qty: 30 G | Refills: 3 | Status: SHIPPED | OUTPATIENT
Start: 2020-12-14 | End: 2020-12-14

## 2020-12-14 RX ORDER — HYDROQUINONE 40 MG/G
CREAM TOPICAL
Qty: 30 G | Refills: 3 | Status: SHIPPED | OUTPATIENT
Start: 2020-12-14 | End: 2021-05-06

## 2020-12-14 ASSESSMENT — PAIN SCALES - GENERAL: PAINLEVEL: NO PAIN (0)

## 2020-12-14 NOTE — NURSING NOTE
Dermatology Rooming Note    Juanitasidra Davalos Carlos's goals for this visit include:   Chief Complaint   Patient presents with     Skin Check     Juanita is here today for a skin check. She would like to discuss spots on her face.     Iqra Brown CMA

## 2020-12-14 NOTE — LETTER
12/14/2020       RE: Juanita Gonzalez  [id number]  [place] [address]     Dear Colleague,    Thank you for referring your patient, Juanita Gonzalez, to the [facility] DERMATOLOGY [facility] [place] at [facility]. Please see a copy of my visit note below.    [facility] Dermatology Note      Dermatology Problem List:  1.Post inflammatory hyperpigmentation-  hydroquinone 4% cream daily and every other night for two months. Then take a one month break.   Start tretinoin 0.025% cream every other night x 2 months then nightly during hydroquinone break.     CC:   Chief Complaint   Patient presents with     Skin Check     Juanita is here today for a skin check. She would like to discuss spots on her face.         Encounter Date: Dec 14, 2020    History of Present Illness:  Ms. Juanita Gonzalez is a 25 year old female who presents in self referral for a skin check and darks spots left on the face. She doesn't have much acne now but used to and the acne left darker spots on her face. She wonders what she can do to even out her skin tone. She has several products that she has used over the counter and would like recommendations. She would like a full skin check as she has never had one of these before. She denies any spots that are painful, bleeding, itchy or changing. She is feeling well, without other skin concerns.     Past Medical History:   Patient Active Problem List   Diagnosis     Obesity     Attention deficit hyperactivity disorder (ADHD)     Disturbance in sleep behavior     Acanthosis nigricans     Acne     Seasonal allergies     Eczema     Vitamin D deficiency     Insulin resistance     Anisometropia     Dysmetabolic syndrome X     Myopia     Enchondroma of wrist or hand     Osteoma     Café au lait spot     Neck pain     Bilateral thoracic back pain     Morbid obesity (H)     Past Medical History:   Diagnosis Date      Allergic conjunctivitis      Anisometropia      Asthma      Attention deficit disorder with hyperactivity(314.01)     stopped medication 1 year ago     Myopia      Nonorganic enuresis     voiding dysfunction      Obesity, unspecified      OCD (obsessive compulsive disorder)      Wears contact lenses      Past Surgical History:   Procedure Laterality Date     DENTAL SURGERY       GRAFT BONE TO FINGER  3/18/2013    Procedure: GRAFT BONE TO FINGER;  Right Second Metacarpal Curettage and Allograft;  Surgeon: Jeremiah Yu MD;  [facility]     HC TOOTH EXTRACTION W/FORCEP       LE FORT ONE N/A 6/11/2015    Procedure: LE FORT ONE;  Surgeon: Vitaliy Yun DDS;  Location: [facility] OR     LE FORT ONE , OSTEOTOMY SAGITTAL SPLIT, COMBINED N/A 12/3/2014    Procedure: COMBINED LE FORT ONE, OSTEOTOMY SAGITTAL SPLIT;  Surgeon: Vitaliy Yun DDS;  Location: [facility] OR     TONGUE SURGERY  9/17/2014    tongue lesion removed       Social History:  Patient reports that she has never smoked. She has never used smokeless tobacco. She reports current alcohol use. She reports previous drug use. Drug: Marijuana. works at [employer].       Family History:  Family History   Problem Relation Age of Onset     Asthma Mother      Obesity Father      Hypertension Maternal Grandmother      Cancer Maternal Grandfather      Hypertension Paternal Grandmother      Glaucoma Other         Mat Great Aunt/blindness     Glaucoma Maternal Aunt      Macular Degeneration No family hx of      Melanoma No family hx of      Skin Cancer No family hx of        Medications:  Current Outpatient Medications   Medication Sig Dispense Refill     albuterol (PROVENTIL) (2.5 MG/3ML) 0.083% neb solution Take 1 vial (2.5 mg) by nebulization every 4 hours as needed for shortness of breath / dyspnea 1 Box 0     cetirizine (ZYRTEC) 10 MG tablet Take 1 tablet (10 mg) by mouth At Bedtime 30 tablet 5     EPINEPHrine (EPIPEN/ADRENACLICK/OR ANY BX GENERIC EQUIV)  0.3 MG/0.3ML injection 2-pack Inject 0.3 mLs (0.3 mg) into the muscle as needed for anaphylaxis 2 each 1     famotidine (PEPCID) 20 MG tablet Take 1 tablet (20 mg) by mouth 2 times daily 60 tablet 5     fluticasone-salmeterol (ADVAIR DISKUS) 250-50 MCG/DOSE inhaler Inhale 1 puff into the lungs 2 times daily 1 Inhaler 0     levonorgestrel (MIRENA, 52 MG,) 20 MCG/24HR IUD 1 device, intrauterine, for up to 5 years. 1 each 0     loratadine (CLARITIN) 10 MG tablet Take 1 tablet (10 mg) by mouth every morning 30 tablet 5     montelukast (SINGULAIR) 10 MG tablet Take 1 tablet (10 mg) by mouth At Bedtime 90 tablet 3     Allergies   Allergen Reactions     Bactrim Hives         Review of Systems:  -Skin/Heme New Pt: The patient denies frequent sun exposure. The patient denies excessive scarring or problems healing except as per HPI. The patient denies excessive bleeding.  -Constitutional: Otherwise feeling well today, in usual state of health.  -Skin: As above in HPI. No additional skin concerns.    Physical exam:  Vitals: There were no vitals taken for this visit.  GEN: This is a well developed, well-nourished female in no acute distress, in a pleasant mood.    SKIN: Full skin, which includes the head/face, both arms, chest, back, abdomen,both legs, genitalia and/or groin buttocks, digits and/or nails, was examined.  -Rojas skin type: V  -There are very rare superifical acneiform papules with intermixed open and closed comedones on the face.   There are hyperemic macules scattered to the forehead, temples. Cheeks, mandibles and chin.   -No other lesions of concern on areas examined.     Labs:  None     Impression/Plan:  1. Very mild acne vulgaris, with post inflammatory hyperpigmentation.    Start hydroquinone 4% cream daily and every other night for two months. Then take a one month break.   Start tretinoin 0.025% cream every other night for two months. Then increase to nightly when taking a break from hydroquinone  cream.    Wear sunscreen during the day. I would recommend barrier sunscreens (with zinc or titanium dioxide)     -Could schedule a cosmetic consultation with  Dr. Julian or Dr. Lopez for chemical peels (glycolic, salicylic)     2. Skin check, no concerning lesions noted.    CC Referred Self, MD  No address on file on close of this encounter.  Follow-up in 3 months, earlier for new or changing lesions.       Staff Involved:  Staff Only    All risks, benefits and alternatives were discussed with patient.  Patient is in agreement and understands the assessment and plan.  All questions were answered.  Sun Screen Education was given.   Return to Clinic in 3 months or sooner as needed.   Alyssa Becerra PA-C

## 2020-12-14 NOTE — LETTER
Patient:  Juanita Gonzalez  :   1995  MRN:     [id number]        Ms.Hannah Susannah Gonzalez  [facility] [id number]  [place] [address]        Dear ,    Juanita Gonzalez , 1995 ,  was seen at our clinic on the following dates: 20    Patient may return to Work without restriction.    Please excuse any tardiness due to her appointment at the [facility] today.     Thank you!    Please call us with any questions.        Sincerely,        Alyssa Becerra PA-C

## 2020-12-14 NOTE — PATIENT INSTRUCTIONS
Dr. Julian or Dr. Lopez   [phone]      Start hydroquinone 4% cream daily and every other night for two months. Then take a one month break.   Start tretinoin 0.025% cream every other night for two months. Then increase to nightly when taking a break from hydroquinone cream.    Wear sunscreen during the day. I would recommend barrier sunscreens (with zinc or titanium dioxide)     ELTA MD HAS A SUNSCREEN WITH IRON OXIDE.  Agatha Nair (MILK)    SUNSCREEN RECOMMENDATION FOR SENSITIVE SKIN  The following sunscreens may be better for your child s sensitive skin. The main active ingredients are inert, either titanium dioxide or zinc oxide. These ingredients are less irritating than chemical sunscreens.  1. Aveeno Active Natural Protection Mineral Block Lotion SPF 30  2. Aveene Baby Natural Protection Face Stick SPF 50+  3. Banana Boat Natural Reflect (baby or kids) SPF 50+  4. Joselito s Bees Chemical-Free Sunscreen SPF 30  5. Blue Lizard Baby SPF 30+  6. Blue Lizard for Sensitive Skin SPF 30+  7. Cotz Pediatric Pure SPF 30  8. Cotz Pediatric Face SPF 40  9. Cotz 20% Zinc SPF 35  10. CVS Sensitive Skin 30  11. CVS Baby Lotion Sunscreen SPF 60+  12. Mustella Broad Spectrum SPF 50+/Mineral Sunscreen Stick  13. Neutrogena Sensitive Skin- Pure and Free Baby SPF 30  14. Neutrogena Sensitive Skin-Pure and Free Baby  SPF 50+  15. Think Baby SPF 50+ Sunscreen  16. Think sport SPF 50+ Sunscreen  17. PreSun Sensitive Sunblock SPF 28  18. Vanicream Sunscreen for Sensitive Skin SPF 60  19. Walgreen s Sensitive Skin SPF 70    Sun protective clothing is also highly recommended. Hats should be worn when outside as well. Many companies are starting to sell clothing that has SPF built into them but here are a few:  1. Coolibar- www.coolNewAerr.EatStreet  2. Solumbra- www.sunprecautions.com   3. Sunday Afternoons- www.sundayafternoons.com   4. Athleta- www.athleta.EatStreet   5. Rit Sun Guard Laundry UV Protectant- can wash UV protectant into  clothes.

## 2020-12-14 NOTE — PROGRESS NOTES
[facility] Dermatology Note      Dermatology Problem List:  1.Post inflammatory hyperpigmentation-  hydroquinone 4% cream daily and every other night for two months. Then take a one month break.   Start tretinoin 0.025% cream every other night x 2 months then nightly during hydroquinone break.     CC:   Chief Complaint   Patient presents with     Skin Check     Juanita is here today for a skin check. She would like to discuss spots on her face.         Encounter Date: Dec 14, 2020    History of Present Illness:  Ms. Juanita Gonzalez is a 25 year old female who presents in self referral for a skin check and darks spots left on the face. She doesn't have much acne now but used to and the acne left darker spots on her face. She wonders what she can do to even out her skin tone. She has several products that she has used over the counter and would like recommendations. She would like a full skin check as she has never had one of these before. She denies any spots that are painful, bleeding, itchy or changing. She is feeling well, without other skin concerns.     Past Medical History:   Patient Active Problem List   Diagnosis     Obesity     Attention deficit hyperactivity disorder (ADHD)     Disturbance in sleep behavior     Acanthosis nigricans     Acne     Seasonal allergies     Eczema     Vitamin D deficiency     Insulin resistance     Anisometropia     Dysmetabolic syndrome X     Myopia     Enchondroma of wrist or hand     Osteoma     Café au lait spot     Neck pain     Bilateral thoracic back pain     Morbid obesity (H)     Past Medical History:   Diagnosis Date     Allergic conjunctivitis      Anisometropia      Asthma      Attention deficit disorder with hyperactivity(314.01)     stopped medication 1 year ago     Myopia      Nonorganic enuresis     voiding dysfunction      Obesity, unspecified      OCD (obsessive compulsive disorder)      Wears contact lenses      Past Surgical History:    Procedure Laterality Date     DENTAL SURGERY       GRAFT BONE TO FINGER  3/18/2013    Procedure: GRAFT BONE TO FINGER;  Right Second Metacarpal Curettage and Allograft;  Surgeon: Jeremiah Yu MD;  Location: [facility] OR     HC TOOTH EXTRACTION W/FORCEP       LE FORT ONE N/A 6/11/2015    Procedure: LE FORT ONE;  Surgeon: Vitaliy Yun DDS;  Location: [facility] OR     LE FORT ONE , OSTEOTOMY SAGITTAL SPLIT, COMBINED N/A 12/3/2014    Procedure: COMBINED LE FORT ONE, OSTEOTOMY SAGITTAL SPLIT;  Surgeon: Vitaliy Yun DDS;  Location: [facility] OR     TONGUE SURGERY  9/17/2014    tongue lesion removed       Social History:  Patient reports that she has never smoked. She has never used smokeless tobacco. She reports current alcohol use. She reports previous drug use. Drug: Marijuana. works at [employer].       Family History:  Family History   Problem Relation Age of Onset     Asthma Mother      Obesity Father      Hypertension Maternal Grandmother      Cancer Maternal Grandfather      Hypertension Paternal Grandmother      Glaucoma Other         Mat Great Aunt/blindness     Glaucoma Maternal Aunt      Macular Degeneration No family hx of      Melanoma No family hx of      Skin Cancer No family hx of        Medications:  Current Outpatient Medications   Medication Sig Dispense Refill     albuterol (PROVENTIL) (2.5 MG/3ML) 0.083% neb solution Take 1 vial (2.5 mg) by nebulization every 4 hours as needed for shortness of breath / dyspnea 1 Box 0     cetirizine (ZYRTEC) 10 MG tablet Take 1 tablet (10 mg) by mouth At Bedtime 30 tablet 5     EPINEPHrine (EPIPEN/ADRENACLICK/OR ANY BX GENERIC EQUIV) 0.3 MG/0.3ML injection 2-pack Inject 0.3 mLs (0.3 mg) into the muscle as needed for anaphylaxis 2 each 1     famotidine (PEPCID) 20 MG tablet Take 1 tablet (20 mg) by mouth 2 times daily 60 tablet 5     fluticasone-salmeterol (ADVAIR DISKUS) 250-50 MCG/DOSE inhaler Inhale 1 puff into the lungs 2 times daily 1 Inhaler 0      levonorgestrel (MIRENA, 52 MG,) 20 MCG/24HR IUD 1 device, intrauterine, for up to 5 years. 1 each 0     loratadine (CLARITIN) 10 MG tablet Take 1 tablet (10 mg) by mouth every morning 30 tablet 5     montelukast (SINGULAIR) 10 MG tablet Take 1 tablet (10 mg) by mouth At Bedtime 90 tablet 3     Allergies   Allergen Reactions     Bactrim Hives         Review of Systems:  -Skin/Heme New Pt: The patient denies frequent sun exposure. The patient denies excessive scarring or problems healing except as per HPI. The patient denies excessive bleeding.  -Constitutional: Otherwise feeling well today, in usual state of health.  -Skin: As above in HPI. No additional skin concerns.    Physical exam:  Vitals: There were no vitals taken for this visit.  GEN: This is a well developed, well-nourished female in no acute distress, in a pleasant mood.    SKIN: Full skin, which includes the head/face, both arms, chest, back, abdomen,both legs, genitalia and/or groin buttocks, digits and/or nails, was examined.  -Rojas skin type: V  -There are very rare superifical acneiform papules with intermixed open and closed comedones on the face.   There are hyperemic macules scattered to the forehead, temples. Cheeks, mandibles and chin.   -No other lesions of concern on areas examined.     Labs:  None     Impression/Plan:  1. Very mild acne vulgaris, with post inflammatory hyperpigmentation.    Start hydroquinone 4% cream daily and every other night for two months. Then take a one month break.   Start tretinoin 0.025% cream every other night for two months. Then increase to nightly when taking a break from hydroquinone cream.    Wear sunscreen during the day. I would recommend barrier sunscreens (with zinc or titanium dioxide)     -Could schedule a cosmetic consultation with  Dr. Julian or Dr. Lopez for chemical peels (glycolic, salicylic)     2. Skin check, no concerning lesions noted.    CC Referred MD Hubert  No address on file on  close of this encounter.  Follow-up in 3 months, earlier for new or changing lesions.       Staff Involved:  Staff Only    All risks, benefits and alternatives were discussed with patient.  Patient is in agreement and understands the assessment and plan.  All questions were answered.  Sun Screen Education was given.   Return to Clinic in 3 months or sooner as needed.   Alyssa Becerra PA-C

## 2021-01-15 ENCOUNTER — HEALTH MAINTENANCE LETTER (OUTPATIENT)
Age: 26
End: 2021-01-15

## 2021-03-04 NOTE — TELEPHONE ENCOUNTER
FUTURE VISIT INFORMATION      FUTURE VISIT INFORMATION:    Date: 3.19.21    Time: 10:45    Location: [facility]  REFERRAL INFORMATION:    Referring provider:  Alyssa Becerra PA-C    Referring providers clinic:  [facility]    Reason for visit/diagnosis  Consult for chemical peel    RECORDS REQUESTED FROM:       Clinic name Comments Records Status Photos Status   [facility] 12.14.20  Alyssa Becerra [facility]

## 2021-03-15 ENCOUNTER — VIRTUAL VISIT (OUTPATIENT)
Dept: DERMATOLOGY | Facility: CLINIC | Age: 26
End: 2021-03-15
Payer: COMMERCIAL

## 2021-03-15 DIAGNOSIS — L70.0 ACNE VULGARIS: Primary | ICD-10-CM

## 2021-03-15 PROCEDURE — 99214 OFFICE O/P EST MOD 30 MIN: CPT | Mod: GQ | Performed by: PHYSICIAN ASSISTANT

## 2021-03-15 RX ORDER — SPIRONOLACTONE 50 MG/1
50 TABLET, FILM COATED ORAL 2 TIMES DAILY
Qty: 180 TABLET | Refills: 0 | Status: SHIPPED | OUTPATIENT
Start: 2021-03-15 | End: 2023-01-17

## 2021-03-15 RX ORDER — CLINDAMYCIN PHOSPHATE 10 UG/ML
LOTION TOPICAL DAILY
Qty: 60 ML | Refills: 3 | Status: SHIPPED | OUTPATIENT
Start: 2021-03-15 | End: 2023-01-17

## 2021-03-15 ASSESSMENT — PAIN SCALES - GENERAL: PAINLEVEL: NO PAIN (0)

## 2021-03-15 NOTE — LETTER
3/15/2021       RE: Juanita Gonzalez  [id number]  [place] [address]     Dear Colleague,    Thank you for referring your patient, Juanita Gonzalez, to the [facility] [place] at [employer]. Please see a copy of my visit note below.    [facility] Dermatology Note  Encounter Date: Mar 15, 2021  Store-and-Forward and Telephone ([phone] ). Location of teledermatologist: [facility] [place].  Start time: 11:17. End time: 11:35    Dermatology Problem List:  1. Acne vulgaris-  Spironolactone 50 mg bid 3/15/21   Clindamycin 1% lotion daily 3/15/21   Start tretinoin 0.025% cream at bedtime 12/14/21   2 Post inflammatory hyperpigmentation-  hydroquinone 4% cream daily and every other night for two months. Then take a one month break. (pt not started this yet)      ____________________________________________    Assessment & Plan:     # Acne vulgaris, flaring. inflammatory and hormonal. Not controlled on topical retinoids.   Start clindamycin 1% lotion daily.    -Start spironolactone 50 mg BID. For the first week take 50 mg daily and if no side-effects increase to bid dosing.  Blood pressure checked on 11/14/20. Labs checked that day including CMP.    -Counseled on side effects of spironolactone including lightheadedness, urinary frequency, spotting between periods and breast tenderness.   -Counseled that spironolactone may take 3-4 months to take clinical effect.    -Counseled that spironolactone can cause feminization to a male fetus and should avoid pregnancy. She has an IUD.     - Continue tretinoin 0.025% cream at bedtime. Retinoid ed given.     2. Post inflammatory hyperpigmentation.   Plan for hydroquinone but she has not started this yet.   Cosmetic consult pending   Sun protection: Counseled SPF30+ sunscreen.        Procedures Performed:    None    Follow-up: 3  month(s) virtually (telephone with photos), or earlier for new or changing lesions    Staff:     All risks, benefits and alternatives were discussed with patient.  Patient is in agreement and understands the assessment and plan.  All questions were answered.  Sun Screen Education was given.   Return to Clinic in 3 months or sooner as needed.   Alyssa Becerra PA-C   ____________________________________________    CC: Derm Problem (zechariah is having this visit today to discuss acne on the face, states that it has been a slight improvment )    HPI:  Ms. Zechariah Gonzalez is a(n) 25 year old female who presents today as a return patient for acne and post inflammatory hyperpigmentation. She was last seen here 12/14/21 when I recommended she start tretinoin 0.025% cream at bedtime. She was to alternate this every other night when she started hydroquinone. She has not started hydroquinone yet for her dark spots. She states she is still having acne breakouts and seems to be more consistent with her regular breakouts. She states her acne was very mild when she was seen last. She is tolerating the tretinoin but is having some dryness.     She has been on birth control most of her adult life. She was off of birth control in 2019 and had regular menses with hormonal flares of acne. She states her acne improves when she is on birth control.       Patient is otherwise feeling well, without additional skin concerns.    Labs Reviewed:  [facility] 11/14/21 wnl     Physical Exam:  Vitals: There were no vitals taken for this visit.  SKIN: Teledermatology photos were reviewed; image quality and interpretability: Acceptable. Image date: 03/15/21  - There are pink papules and pustules scattered to her forehead cheeks chin  Numerous hyperemic macules scattered to the face.   - No other lesions of concern on areas examined.     Medications:  Current Outpatient Medications   Medication     cetirizine (ZYRTEC) 10 MG tablet     EPINEPHrine  (EPIPEN/ADRENACLICK/OR ANY BX GENERIC EQUIV) 0.3 MG/0.3ML injection 2-pack     famotidine (PEPCID) 20 MG tablet     hydroquinone (PAIGE) 4 % external cream     levonorgestrel (MIRENA, 52 MG,) 20 MCG/24HR IUD     loratadine (CLARITIN) 10 MG tablet     montelukast (SINGULAIR) 10 MG tablet     tretinoin (RETIN-A) 0.025 % external cream     albuterol (PROVENTIL) (2.5 MG/3ML) 0.083% neb solution     fluticasone-salmeterol (ADVAIR DISKUS) 250-50 MCG/DOSE inhaler     No current facility-administered medications for this visit.       Past Medical/Surgical History:   Patient Active Problem List   Diagnosis     Obesity     Attention deficit hyperactivity disorder (ADHD)     Disturbance in sleep behavior     Acanthosis nigricans     Acne     Seasonal allergies     Eczema     Vitamin D deficiency     Insulin resistance     Anisometropia     Dysmetabolic syndrome X     Myopia     Enchondroma of wrist or hand     Osteoma     Café au lait spot     Neck pain     Bilateral thoracic back pain     Morbid obesity (H)     Past Medical History:   Diagnosis Date     Allergic conjunctivitis      Anisometropia      Asthma      Attention deficit disorder with hyperactivity(314.01)     stopped medication 1 year ago     Myopia      Nonorganic enuresis     voiding dysfunction      Obesity, unspecified      OCD (obsessive compulsive disorder)      Wears contact lenses        CC Referred Self, MD  No address on file on close of this encounter.

## 2021-03-15 NOTE — PROGRESS NOTES
[facility] Dermatology Note  Encounter Date: Mar 15, 2021  Store-and-Forward and Telephone ([phone] ). Location of teledermatologist: [facility] [place].  Start time: 11:17. End time: 11:35    Dermatology Problem List:  1. Acne vulgaris-  Spironolactone 50 mg bid 3/15/21   Clindamycin 1% lotion daily 3/15/21   Start tretinoin 0.025% cream at bedtime 12/14/21   2 Post inflammatory hyperpigmentation-  hydroquinone 4% cream daily and every other night for two months. Then take a one month break. (pt not started this yet)      ____________________________________________    Assessment & Plan:     # Acne vulgaris, flaring. inflammatory and hormonal. Not controlled on topical retinoids.   Start clindamycin 1% lotion daily.    -Start spironolactone 50 mg BID. For the first week take 50 mg daily and if no side-effects increase to bid dosing.  Blood pressure checked on 11/14/20. Labs checked that day including CMP.    -Counseled on side effects of spironolactone including lightheadedness, urinary frequency, spotting between periods and breast tenderness.   -Counseled that spironolactone may take 3-4 months to take clinical effect.    -Counseled that spironolactone can cause feminization to a male fetus and should avoid pregnancy. She has an IUD.     - Continue tretinoin 0.025% cream at bedtime. Retinoid ed given.     2. Post inflammatory hyperpigmentation.   Plan for hydroquinone but she has not started this yet.   Cosmetic consult pending   Sun protection: Counseled SPF30+ sunscreen.        Procedures Performed:    None    Follow-up: 3 month(s) virtually (telephone with photos), or earlier for new or changing lesions    Staff:     All risks, benefits and alternatives were discussed with patient.  Patient is in agreement and understands the assessment and plan.  All questions were answered.  Sun Screen Education was given.   Return to Clinic in 3 months or sooner as  needed.   Alyssa Becerra PA-C   ____________________________________________    CC: Derm Problem (zechariah is having this visit today to discuss acne on the face, states that it has been a slight improvment )    HPI:  Ms. Zechariah Gonzalez is a(n) 25 year old female who presents today as a return patient for acne and post inflammatory hyperpigmentation. She was last seen here 12/14/21 when I recommended she start tretinoin 0.025% cream at bedtime. She was to alternate this every other night when she started hydroquinone. She has not started hydroquinone yet for her dark spots. She states she is still having acne breakouts and seems to be more consistent with her regular breakouts. She states her acne was very mild when she was seen last. She is tolerating the tretinoin but is having some dryness.     She has been on birth control most of her adult life. She was off of birth control in 2019 and had regular menses with hormonal flares of acne. She states her acne improves when she is on birth control.       Patient is otherwise feeling well, without additional skin concerns.    Labs Reviewed:  [facility] 11/14/21 wnl     Physical Exam:  Vitals: There were no vitals taken for this visit.  SKIN: Teledermatology photos were reviewed; image quality and interpretability: Acceptable. Image date: 03/15/21  - There are pink papules and pustules scattered to her forehead cheeks chin  Numerous hyperemic macules scattered to the face.   - No other lesions of concern on areas examined.     Medications:  Current Outpatient Medications   Medication     cetirizine (ZYRTEC) 10 MG tablet     EPINEPHrine (EPIPEN/ADRENACLICK/OR ANY BX GENERIC EQUIV) 0.3 MG/0.3ML injection 2-pack     famotidine (PEPCID) 20 MG tablet     hydroquinone (PAIGE) 4 % external cream     levonorgestrel (MIRENA, 52 MG,) 20 MCG/24HR IUD     loratadine (CLARITIN) 10 MG tablet     montelukast (SINGULAIR) 10 MG tablet     tretinoin (RETIN-A) 0.025 % external cream      albuterol (PROVENTIL) (2.5 MG/3ML) 0.083% neb solution     fluticasone-salmeterol (ADVAIR DISKUS) 250-50 MCG/DOSE inhaler     No current facility-administered medications for this visit.       Past Medical/Surgical History:   Patient Active Problem List   Diagnosis     Obesity     Attention deficit hyperactivity disorder (ADHD)     Disturbance in sleep behavior     Acanthosis nigricans     Acne     Seasonal allergies     Eczema     Vitamin D deficiency     Insulin resistance     Anisometropia     Dysmetabolic syndrome X     Myopia     Enchondroma of wrist or hand     Osteoma     Café au lait spot     Neck pain     Bilateral thoracic back pain     Morbid obesity (H)     Past Medical History:   Diagnosis Date     Allergic conjunctivitis      Anisometropia      Asthma      Attention deficit disorder with hyperactivity(314.01)     stopped medication 1 year ago     Myopia      Nonorganic enuresis     voiding dysfunction      Obesity, unspecified      OCD (obsessive compulsive disorder)      Wears contact lenses        CC Referred Self, MD  No address on file on close of this encounter.

## 2021-03-15 NOTE — PATIENT INSTRUCTIONS
For acne:  In the morning:  Apply clindamycin 1% lotion to the face after washing face with a gentle cleanser (Cetaphil or CeraVe)  Apply a noncomedogenic moisturizer compounded with an SPF of at least 30.  In the evening:  Wash face with a gentle cleanser (such as Cetaphil or CeraVe)  Apply moisturizer to face, then wait 30 minutes.  Apply a pea-sized amount of tretinoin 0.025% cream thinly to the entire face. Okay to apply a moisturizer. See handout below for more information on tretinoin.     -Start spironolactone 50 mg. For the first week take 50 mg daily and if no side-effects increase to twice daily daily dosing.   -Counseled on side effects of spironolactone including lightheadedness, urinary frequency, spotting between periods and breast tenderness.   -Counseled that spironolactone may take 3-4 months to take clinical effect.    -Counseled that spironolactone can cause feminization to a male fetus and should avoid pregnancy. She is on IUD.     Topical Retinoids Info    What are topical retinoids?    These are medicines that are related to Vitamin A. They are used on the skin.    Retin-A , Renova , Differin , and Tazorac  are brand names.    Come in creams and clear gels    Used to treat skin conditions like pimples (acne), face wrinkling, or dark-colored sunspots    How do I use these medicines?    Wash face and let dry for 15 to 30 minutes.    Use a large pea-size amount of medicine to cover the whole face. Do not put on close to the eyes and lips. Rub in gently.     Start by using every other day. If you have no irritation after a few days, start to use it daily.     You might have too much irritation with daily use. Use it less often until the irritation goes away. Then try to increase slowly to daily use.     Irritation improves over time.    You may use moisturizer if your skin becomes dry. Look for  non-comedogenic  (non-pore plugging) and oil free products.     What are the side effects?    Dryness      Peeling and flaking     Irritation of the skin     Possible increased chance of sunburns. Protect your skin from sunlight. Wear a hat and use a sunscreen with SPF 30 or higher. Your sunscreen should have both UVA and UVB (broad-spectrum) protection.    Who should I call with questions?    [facility]: [phone]     [facility]: [phone]    For urgent needs outside of business hours call the [facility] at [phone] and ask for the dermatology resident on call

## 2021-03-19 ENCOUNTER — PRE VISIT (OUTPATIENT)
Dept: DERMATOLOGY | Facility: CLINIC | Age: 26
End: 2021-03-19

## 2021-03-22 ENCOUNTER — TELEPHONE (OUTPATIENT)
Dept: DERMATOLOGY | Facility: CLINIC | Age: 26
End: 2021-03-22

## 2021-03-22 NOTE — TELEPHONE ENCOUNTER
"[facility] Call Center    Phone Message    May a detailed message be left on voicemail: yes     Reason for Call: Other: `    Pt missed appt on 3/19/21 as she believed it to be a virtual appt.    Pt is looking to reschedule \"Consult for chemical peel  Records complete 3.3.21 jm\" with Dr Lopez.    Return Pt protocols show to send TE for Chemical Peels. Please return call to Pt to schedule.    Action Taken: Message routed to:  [facility] Derm    Travel Screening: Not Applicable                                                                         "

## 2021-04-30 ENCOUNTER — OFFICE VISIT (OUTPATIENT)
Dept: DERMATOLOGY | Facility: CLINIC | Age: 26
End: 2021-04-30
Payer: COMMERCIAL

## 2021-04-30 DIAGNOSIS — L81.0 POST-INFLAMMATORY HYPERPIGMENTATION: ICD-10-CM

## 2021-04-30 DIAGNOSIS — L70.0 ACNE VULGARIS: Primary | ICD-10-CM

## 2021-04-30 PROCEDURE — 99213 OFFICE O/P EST LOW 20 MIN: CPT | Performed by: DERMATOLOGY

## 2021-04-30 ASSESSMENT — PAIN SCALES - GENERAL: PAINLEVEL: NO PAIN (0)

## 2021-04-30 NOTE — LETTER
4/30/2021       RE: Juanita Gonzalez  [id number]  [facility] [address]     Dear Colleague,    Thank you for referring your patient, Juanita Gonzalez, to the [facility] DERMATOLOGY [facility]IN[facility] [place] [facility]. Please see a copy of my visit note below.    [facility] Dermatology Note  Encounter Date: Apr 30, 2021  Office Visit     Dermatology Problem List:  1. Acne vulgaris-  Spironolactone 50 mg bid 3/15/21   Clindamycin 1% lotion daily 3/15/21   Start tretinoin 0.025% cream at bedtime 12/14/21   2 Post inflammatory hyperpigmentation-  - plan for skinceuticals micropeel plus   ____________________________________________    Assessment & Plan:    # Post inflammatory hyperpigmentation and acne vulgaris: Chronic, stable.  Patient has struggled with the hyperpigmentation and acne since high school. She has tried hydroquinone cream in the past but did not tolerate the treatment. No current treatment for hyperpigmentation at the time. With both the post inflammatory hyperpigmentation and active acne, discussed that salicyclic acid chemical peels would be best as would treat for both. Discussed out of pocket expense. Discussed treatment and healing expectations. She is interested in trying a chemical peel at this time. Peels will need to be completed at  as our nurse at the [facility] is going on maternity leave soon. Will let staff at  know to call and help patient schedule. Pre and post chemical peel instructions given today.    For skin peels on the face: Discussed risk of peels including but not limited to erythema, peeling, redness, blisters, reactivation of HSV, rare risk of scarring, and hypo and hyperpigmentation. Peel handout provided. Patient will likely require 3-6 peels, approximately 4-6 weeks apart for improvement. Patient will be using SkinCeuticals Micropeel Plus (20% salicylic acid, glycolic acid and alcohol) -  not for strawberry or aspirin allergies. peel. Patient will discontinue differin/retinoinds/BP/efudex containing products 1 week prior and 1 week after. Rojas skin type V. Denies allergies to strawberries/aspirin/sulfa.  Has not been in Accutane in the last 6 months.  Not currently pregnant or breastfeeding. Patient was counseled to avoid hair removal procedures including lasers, depilatory cream, waxing and electrolysis the week prior.     Follow-up: 1 month(s) in-person, or earlier for new or changing lesions    Staff and Medical Student:     Dorothea Sweeney, MS4    Staff Physician:  I was present with the medical student who participated in the service and in the documentation of the note. I have verified the history and personally performed the physical exam and medical decision making. I agree with the assessment and plan of care as documented in the note.     Danica Lopez MD    Department of Dermatology  [facility]: Phone: [phone], Fax:[phone]  [facility]: Phone: [phone], Fax: [phone]  ____________________________________________    CC: Skin Check (pt states she is here for a chemical peel.)    HPI:  Ms. Juanita Gonzalez is a(n) 25 year old female who presents today as a new patient for chemical peel consultation. Patient has a long standing history of acne vulgaris. She is currently treated with spironolactone, clindamycin, and tretinoin. Her primary dermatologist did prescribe hydroquinone 4% cream which patient is currently not using. She reports that she has used it in the past and did not tolerate it well. Her acne is still active. No other treatments at this time.      Patient is otherwise feeling well, without additional skin concerns.    Labs:  None reviewed.    Physical Exam:  Vitals: There were no vitals taken for this visit.  SKIN: Focused  exam of the face  - Scattered superificial pink papules and pustules on forehead, bilateral cheeks, and chin. About 6 lesions in all.  - Multiple hyperpigmented macules on cheeks bilaterally, forehead, and chin  - No other lesions of concern on areas examined.     Medications:  Current Outpatient Medications   Medication     cetirizine (ZYRTEC) 10 MG tablet     clindamycin (CLEOCIN T) 1 % external lotion     EPINEPHrine (EPIPEN/ADRENACLICK/OR ANY BX GENERIC EQUIV) 0.3 MG/0.3ML injection 2-pack     famotidine (PEPCID) 20 MG tablet     hydroquinone (PAIGE) 4 % external cream     levonorgestrel (MIRENA, 52 MG,) 20 MCG/24HR IUD     loratadine (CLARITIN) 10 MG tablet     montelukast (SINGULAIR) 10 MG tablet     spironolactone (ALDACTONE) 50 MG tablet     tretinoin (RETIN-A) 0.025 % external cream     albuterol (PROVENTIL) (2.5 MG/3ML) 0.083% neb solution     fluticasone-salmeterol (ADVAIR DISKUS) 250-50 MCG/DOSE inhaler     No current facility-administered medications for this visit.       Past Medical History:   Patient Active Problem List   Diagnosis     Obesity     Attention deficit hyperactivity disorder (ADHD)     Disturbance in sleep behavior     Acanthosis nigricans     Acne     Seasonal allergies     Eczema     Vitamin D deficiency     Insulin resistance     Anisometropia     Dysmetabolic syndrome X     Myopia     Enchondroma of wrist or hand     Osteoma     Café au lait spot     Neck pain     Bilateral thoracic back pain     Morbid obesity (H)     Past Medical History:   Diagnosis Date     Allergic conjunctivitis      Anisometropia      Asthma      Attention deficit disorder with hyperactivity(314.01)     stopped medication 1 year ago     Myopia      Nonorganic enuresis     voiding dysfunction      Obesity, unspecified      OCD (obsessive compulsive disorder)      Wears contact lenses       CC Referred Self, MD  No address on file on close of this encounter.

## 2021-04-30 NOTE — PROGRESS NOTES
[facility] Dermatology Note  Encounter Date: Apr 30, 2021  Office Visit     Dermatology Problem List:  1. Acne vulgaris-  Spironolactone 50 mg bid 3/15/21   Clindamycin 1% lotion daily 3/15/21   Start tretinoin 0.025% cream at bedtime 12/14/21   2 Post inflammatory hyperpigmentation-  - plan for skinceuticals micropeel plus   ____________________________________________    Assessment & Plan:    # Post inflammatory hyperpigmentation and acne vulgaris: Chronic, stable.  Patient has struggled with the hyperpigmentation and acne since high school. She has tried hydroquinone cream in the past but did not tolerate the treatment. No current treatment for hyperpigmentation at the time. With both the post inflammatory hyperpigmentation and active acne, discussed that salicyclic acid chemical peels would be best as would treat for both. Discussed out of pocket expense. Discussed treatment and healing expectations. She is interested in trying a chemical peel at this time. Peels will need to be completed at  as our nurse at the [facility] is going on maternity leave soon. Will let staff at  know to call and help patient schedule. Pre and post chemical peel instructions given today.    For skin peels on the face: Discussed risk of peels including but not limited to erythema, peeling, redness, blisters, reactivation of HSV, rare risk of scarring, and hypo and hyperpigmentation. Peel handout provided. Patient will likely require 3-6 peels, approximately 4-6 weeks apart for improvement. Patient will be using SkinCeuticals Micropeel Plus (20% salicylic acid, glycolic acid and alcohol) - not for strawberry or aspirin allergies. peel. Patient will discontinue differin/retinoinds/BP/efudex containing products 1 week prior and 1 week after. Rojas skin type V. Denies allergies to strawberries/aspirin/sulfa.  Has not been in Accutane in the last 6 months.  Not currently pregnant or breastfeeding. Patient was  counseled to avoid hair removal procedures including lasers, depilatory cream, waxing and electrolysis the week prior.     Follow-up: 1 month(s) in-person, or earlier for new or changing lesions    Staff and Medical Student:     Dorothea Sweeney, MS4    Staff Physician:  I was present with the medical student who participated in the service and in the documentation of the note. I have verified the history and personally performed the physical exam and medical decision making. I agree with the assessment and plan of care as documented in the note.     Danica Lopez MD    Department of Dermatology  [facility]: Phone: [phone], Fax:[phone]  [facility]: Phone: [phone], Fax: [phone]  ____________________________________________    CC: Skin Check (pt states she is here for a chemical peel.)    HPI:  Ms. Juanita Gonzalez is a(n) 25 year old female who presents today as a new patient for chemical peel consultation. Patient has a long standing history of acne vulgaris. She is currently treated with spironolactone, clindamycin, and tretinoin. Her primary dermatologist did prescribe hydroquinone 4% cream which patient is currently not using. She reports that she has used it in the past and did not tolerate it well. Her acne is still active. No other treatments at this time.      Patient is otherwise feeling well, without additional skin concerns.    Labs:  None reviewed.    Physical Exam:  Vitals: There were no vitals taken for this visit.  SKIN: Focused exam of the face  - Scattered superificial pink papules and pustules on forehead, bilateral cheeks, and chin. About 6 lesions in all.  - Multiple hyperpigmented macules on cheeks bilaterally, forehead, and chin  - No other lesions of concern on areas examined.     Medications:  Current Outpatient Medications   Medication      cetirizine (ZYRTEC) 10 MG tablet     clindamycin (CLEOCIN T) 1 % external lotion     EPINEPHrine (EPIPEN/ADRENACLICK/OR ANY BX GENERIC EQUIV) 0.3 MG/0.3ML injection 2-pack     famotidine (PEPCID) 20 MG tablet     hydroquinone (PAIGE) 4 % external cream     levonorgestrel (MIRENA, 52 MG,) 20 MCG/24HR IUD     loratadine (CLARITIN) 10 MG tablet     montelukast (SINGULAIR) 10 MG tablet     spironolactone (ALDACTONE) 50 MG tablet     tretinoin (RETIN-A) 0.025 % external cream     albuterol (PROVENTIL) (2.5 MG/3ML) 0.083% neb solution     fluticasone-salmeterol (ADVAIR DISKUS) 250-50 MCG/DOSE inhaler     No current facility-administered medications for this visit.       Past Medical History:   Patient Active Problem List   Diagnosis     Obesity     Attention deficit hyperactivity disorder (ADHD)     Disturbance in sleep behavior     Acanthosis nigricans     Acne     Seasonal allergies     Eczema     Vitamin D deficiency     Insulin resistance     Anisometropia     Dysmetabolic syndrome X     Myopia     Enchondroma of wrist or hand     Osteoma     Café au lait spot     Neck pain     Bilateral thoracic back pain     Morbid obesity (H)     Past Medical History:   Diagnosis Date     Allergic conjunctivitis      Anisometropia      Asthma      Attention deficit disorder with hyperactivity(314.01)     stopped medication 1 year ago     Myopia      Nonorganic enuresis     voiding dysfunction      Obesity, unspecified      OCD (obsessive compulsive disorder)      Wears contact lenses         CC Referred Self, MD  No address on file on close of this encounter.

## 2021-04-30 NOTE — NURSING NOTE
Chief Complaint   Patient presents with     Skin Check     pt states she is here for a chemical peel.     Karissa Plaza MA

## 2021-04-30 NOTE — PATIENT INSTRUCTIONS
Chemical Peel Preparation    Pre-procedure:    Reveal all medical or skin conditions, especially cold sores, use of prescriptions medications, recent peels or other facial procedure, etc. to help determine procedure plan    Arrive with a fully cleansed face, free of makeup    It is recommended that men not shave within a few hours prior to the treatment    Plan for photos to track progress and in case of side effects    Stop ALL of the following 7 days pre-procedure:    Topical vitamin A products such as retinols or retinoids (Differin, adapalene, Retin-A, tretinoin)    Products with glycolic acid, salicylic acid, Vitamin A, hydroquinone    Depilatory use/waxing    Chemical peel within the 8 weeks depending on peel depth    Excessively drying or irritating products    Electrolysis    Hair chemicals    2 weeks pre-procedure:    Cannot have Botox of fillers    4-6 weeks pre-procedure:    Free of cystic acne or herpes outbreak    If using Efudex treatment for pre-cancer, treatment needs to be completed    Who should I call with questions?    [facility]: [phone]    [facility]: [phone]    For urgent needs outside of business hours call the [facility] at [phone] and ask for the dermatology [id number]        Skin Peel Post Care Instructions    I will experience redness, swelling, peeling, pain, and heat sensation which can last 5-10days and may persist for 2-3weeks. I may experience itching, or acne. Risks are permanent scarring, temporary or permanent skin lightening or darkening, infection, and eye injury. I understand my outcome could be no improvement or slight improvement. Multiple treatments may be required.     What can I expect?    Skin peeling for three to five days    Flaking    Skin darkening    Redness    How do I take care of my skin?    Patient compliance is mandatory for an optimal outcome and to avoid  complications    Wear sunscreen (SPF 30 or greater) and a hat. Stay out of the sun for three to four weeks     Use a gentle skin care regimen with a sunscreen with at least an SPF of 30 or more. Examples include the following:   o SkinCeuticals Gentle Cleanser and SkinCeuticals Physical Matte UV DEFENSE SPF 50   o Cetaphil Soap followed by Cetaphil Sunscreen in the am and pm    Put on a bland moisturizer (Aquaphor or Vaseline) if sunscreen stings    Do not pick at peel or peel the skin. This will cause scarring.    Wait to use medicated creams until the skin has healed. This occurs five to seven days later    You can use make-up after 24 hours. Make sure make-up does NOT contain salicylic acid.     Eye make-up and lipstick are okay immediately after procedure    Do not use facial scrubs, depilatories, steam, any exfoliation procedures, etc. on your face (or treated area of skin) for one week post-peel    When should I call the doctor?    Cold sores    Swelling    Crusting    Who should I call with questions?    [facility]: [phone]     [facility]: [phone]    For urgent needs outside of business hours call the [facility] at [phone] and ask for the dermatology resident on call

## 2021-05-03 ENCOUNTER — TELEPHONE (OUTPATIENT)
Dept: DERMATOLOGY | Facility: CLINIC | Age: 26
End: 2021-05-03

## 2021-05-03 NOTE — TELEPHONE ENCOUNTER
I left a message for patient to call [facility].    Please provide quote for Skin Medica Illuminize peel ($100 per peel).  Dr. Lopez recommends 1 Illuminize peel and if tolerated then advance to Micropeel Plus 20% ($125 per peel or $325 for package of 3).  Review the following:    Chemical Peel Preparation    Pre-procedure:    Reveal all medical or skin conditions, especially cold sores, use of prescriptions medications, recent peels or other facial procedure, etc. to help determine procedure plan    Arrive with a fully cleansed face, free of makeup    It is recommended that men not shave within a few hours prior to the treatment    Plan for photos to track progress and in case of side effects    Stop ALL of the following 7 days pre-procedure:    Topical vitamin A products such as retinols or retinoids (Differin, adapalene, Retin-A, tretinoin)    Products with glycolic acid, salicylic acid, Vitamin A, hydroquinone    Depilatory use/waxing    Chemical peel within the 8 weeks depending on peel depth    Excessively drying or irritating products    Electrolysis    Hair chemicals    2 weeks pre-procedure:    Cannot have Botox of fillers    4-6 weeks pre-procedure:    Free of cystic acne or herpes outbreak    If using Efudex treatment for pre-cancer, treatment needs to be completed    Who should I call with questions?    [facility]: [phone]    [facility]: [phone]    For urgent needs outside of business hours call the [facility] at [phone] and ask for the dermatology [id number]        Annette Cain RN

## 2021-05-17 ENCOUNTER — TELEPHONE (OUTPATIENT)
Dept: INTERNAL MEDICINE | Facility: CLINIC | Age: 26
End: 2021-05-17

## 2021-05-17 ENCOUNTER — ALLIED HEALTH/NURSE VISIT (OUTPATIENT)
Dept: NURSING | Facility: CLINIC | Age: 26
End: 2021-05-17
Payer: COMMERCIAL

## 2021-05-17 DIAGNOSIS — L70.0 ACNE VULGARIS: Primary | ICD-10-CM

## 2021-05-17 PROCEDURE — 96999 UNLISTED SPEC DERM SVC/PX: CPT | Performed by: DERMATOLOGY

## 2021-05-17 ASSESSMENT — PAIN SCALES - GENERAL: PAINLEVEL: NO PAIN (0)

## 2021-05-17 NOTE — TELEPHONE ENCOUNTER
This is not on patient's med list and I have never prescribed.  She either needs to obtain from previous ordering provider or schedule follow-up with me to discuss.    Tricia Rodriguez, CNP

## 2021-05-17 NOTE — TELEPHONE ENCOUNTER
Reason for Call:  Medication or medication refill:    Do you use a [facility]?  Name of the pharmacy and phone number for the current request:  [facility]    Name of the medication requested: amphetamine-dextroamphetamine (ADDERALL XR) 20 MG per 24 hr capsule    Can we leave a detailed message on this number? YES    Phone number patient can be reached at: Cell number on file:    Telephone Information:   Mobile [phone]     Best Time: anytime    Call taken on 5/17/2021 at 2:12 PM by Octavia Huang

## 2021-05-17 NOTE — TELEPHONE ENCOUNTER
Spoke with pt and informed of Tricia's message below. Pt stated she had received a refill with Tricia in the past and has been having difficulty getting an appointment with the clinic. She said she was unhappy with everything and asked for how she chould see a different provider closer to her in [place] (she moved). Writer transferred her to the central schedule line ot help assist with finding an appointment closer to her home.    Jade HEATH RN, BSN  [facility]

## 2021-05-17 NOTE — PATIENT INSTRUCTIONS
Skin Peel Post Care Instructions    I will experience redness, swelling, peeling, pain, and heat sensation which can last 5-10days and may persist for 2-3weeks. I may experience itching, or acne. Risks are permanent scarring, temporary or permanent skin lightening or darkening, infection, and eye injury. I understand my outcome could be no improvement or slight improvement. Multiple treatments may be required.     What can I expect?    Skin peeling for three to five days    Flaking    Skin darkening    Redness    How do I take care of my skin?    Patient compliance is mandatory for an optimal outcome and to avoid complications    Wear sunscreen (SPF 30 or greater) and a hat. Stay out of the sun for three to four weeks     Use a gentle skin care regimen with a sunscreen with at least an SPF of 30 or more. Examples include the following:   o SkinCeuticals Gentle Cleanser and SkinCeuticals Physical Matte UV DEFENSE SPF 50   o Cetaphil Soap followed by Cetaphil Sunscreen in the am and pm    Put on a bland moisturizer (Aquaphor or Vaseline) if sunscreen stings    Do not pick at peel or peel the skin. This will cause scarring.    Wait to use medicated creams until the skin has healed. This occurs five to seven days later    You can use make-up after 24 hours. Make sure make-up does NOT contain salicylic acid.     Eye make-up and lipstick are okay immediately after procedure    Do not use facial scrubs, depilatories, steam, any exfoliation procedures, etc. on your face (or treated area of skin) for one week post-peel    When should I call the doctor?    Cold sores    Swelling    Crusting    Who should I call with questions?    [facility]: [phone]     [facility]: [phone]    For urgent needs outside of business hours call the [facility] at [phone] and ask for the dermatology resident on call

## 2021-05-17 NOTE — NURSING NOTE
Nursing Peel Treatment Record:  May 17, 2021    Pre peel:    Any changes in health or medications: No    Strawberry or aspirin allergy (If yes, then no salicylic acid peels to be given and procedure to be held):  No    Sulfa allergy (If yes, then SkinCeuticals Sensitive Skin peel procedure to be held):  No    Is the patient taking Valtrex:  No.  If so, date started:      Pregnant or breastfeeding (If yes, peel procedure to be held): No    Baseline photo obtained (3 views): Yes    Areas treated today: face     Treatment #: 1.    Was retinoid stopped 7 days prior to peel: Yes    Peel Type: Illuminize peel(Skin Medica)    Has patient had electrolysis, depilatory creams, waxing or laser hair removal in the last week to treatment site: No.  If yes, then hold peel.    Peel record:    Eye shields were placed.    Area to be treated was cleansed with Simply Clean Cleanser using rough 4X4 gauze pads.    Dermaplaning was performed: no.    Area was then toned with with the Conditioning Solution using rough 4X4 gauze pads.    Then, the areas were degreased with acetone. Time-out was performed to evaluate for any sensitive skin.      Hydrabalm was then applied to the lips, perinasal region and near the outer canthi.     The Iluminize peel was applied with salamanca swabs for 3 passes,Area above lip pt c/o burning so that area rinsed with water. CE Ferulic, Phytocorrective gel and epidermal repair topicals were applied and followed by Sunscreen (Sheer Physical UV defence SPF 50).         Post peel:    Patient reminded of need to wait to use medicated creams until the skin has healed. This occurs five to seven days later. Post peel care instructions provided including need for sun avoidance. Patient tolerated peel well, no complications noted.     Payment:  The patient paid $100.00 for this peel.     Ashwini Ware RN

## 2021-05-26 NOTE — NURSING NOTE
Juanita Gonzalez comes into clinic today at the request of Dr. Lopez Ordering Provider for chemical peel         This service provided today was under the supervising provider of the day  who was available if needed.    Ashwini Ware RN

## 2021-05-27 VITALS
HEART RATE: 77 BPM | DIASTOLIC BLOOD PRESSURE: 78 MMHG | TEMPERATURE: 98.4 F | SYSTOLIC BLOOD PRESSURE: 111 MMHG | OXYGEN SATURATION: 99 % | RESPIRATION RATE: 16 BRPM

## 2021-06-03 ENCOUNTER — VIRTUAL VISIT (OUTPATIENT)
Dept: FAMILY MEDICINE | Facility: CLINIC | Age: 26
End: 2021-06-03
Payer: COMMERCIAL

## 2021-06-03 DIAGNOSIS — F90.1 ATTENTION DEFICIT HYPERACTIVITY DISORDER (ADHD), PREDOMINANTLY HYPERACTIVE TYPE: Primary | ICD-10-CM

## 2021-06-03 PROCEDURE — 99214 OFFICE O/P EST MOD 30 MIN: CPT | Mod: 95 | Performed by: NURSE PRACTITIONER

## 2021-06-03 RX ORDER — DEXTROAMPHETAMINE SACCHARATE, AMPHETAMINE ASPARTATE MONOHYDRATE, DEXTROAMPHETAMINE SULFATE AND AMPHETAMINE SULFATE 2.5; 2.5; 2.5; 2.5 MG/1; MG/1; MG/1; MG/1
10 CAPSULE, EXTENDED RELEASE ORAL DAILY
Qty: 30 CAPSULE | Refills: 0 | Status: SHIPPED | OUTPATIENT
Start: 2021-06-03 | End: 2021-07-07

## 2021-06-03 ASSESSMENT — PAIN SCALES - GENERAL: PAINLEVEL: NO PAIN (0)

## 2021-06-03 NOTE — NURSING NOTE
Chief Complaint   Patient presents with     Recheck Medication     Discuss medication.     RASHI Alfred 10:09 AM  6/3/2021

## 2021-06-03 NOTE — PATIENT INSTRUCTIONS
[facility] Medication Refill Request Information:  * Please contact your pharmacy regarding ANY request for medication refills.  ** [facility] Clinic Prescription Fax = [phone]  * Please allow 3 business days for routine medication refills.  * Please allow 5 business days for controlled substance medication refills.     [facility] Test Result notification information:  *You will be notified with in 7-10 days of your appointment day regarding the results of your test.  If you are on MyChart you will be notified as soon as the provider has reviewed the results and signed off on them.    [facility]: [phone]     If you have questions regarding Covid-19 and the Covid-19 vaccine, please visit this website.    [email or web address]

## 2021-06-03 NOTE — PROGRESS NOTES
Juanita is a 25 year old who is being evaluated via a billable video visit.      How would you like to obtain your AVS? MyChart  If the video visit is dropped, the invitation should be resent by: Text to cell phone: [phone]  Will anyone else be joining your video visit? No      Video Start Time: 1020    Subjective   Juanita is a 25 year old who presents for discussion regarding her ADHD.     HPI   ADHD:Juanita is a new patient today. Her previous records are reviewed with her in detail. It is noted that Dr. Peña diagnosed Juanita with ADHD as a school aged patient. She has a history of taking Concerta and Adderall. She is not sure which medication was more effective. She has not taken any ADHD medications for the past 3 years. She experienced an episode of angioedema and is not sure what the cause of this was. She has an epi pen due to this episode. She feels confident that the Adderall was not the cause of there angioedema. She presents today to start back on medication as she is currently having trouble focusing and completing simple tasks. She will be establishing care with the [facility] 06/23/2021.     Review of Systems   Constitutional, HEENT, cardiovascular, pulmonary, gi and gu systems are negative, except as otherwise noted.      Objective    Vitals - Patient Reported  Pain Score: No Pain (0)    Physical Exam   GENERAL: Healthy, alert and no distress  EYES: Eyes grossly normal to inspection.  No discharge or erythema, or obvious scleral/conjunctival abnormalities.  RESP: No audible wheeze, cough, or visible cyanosis.  No visible retractions or increased work of breathing.    SKIN: Visible skin clear. No significant rash, abnormal pigmentation or lesions.  NEURO: Cranial nerves grossly intact.  Mentation and speech appropriate for age.  PSYCH: Mentation appears normal, affect normal/bright, judgement and insight intact, normal speech and appearance well-groomed.    No Diagnostics ordered today.     Video-Visit  Details    Type of service:  Video Visit    Video End Time:1052    Originating Location (pt. Location): Home    Distant Location (provider location): Provider's home     Platform used for Video Visit: Mitali     Assessment & Plan     Attention deficit hyperactivity disorder (ADHD), predominantly hyperactive type    - amphetamine-dextroamphetamine (ADDERALL XR) 10 MG 24 hr capsule; Take 1 capsule (10 mg) by mouth daily    Return in about 20 days (around 6/23/2021) for in person.  First, Juanita will start back on Adderall for her ADHD (see diagnosis in previous records). Next, she will follow up and establish care with PINKY Pedroza in [facility] on 06/23/2021. Next, she will call our office with any concerns or questions prior to that next appt. She verbalizes understanding of the plan.   PINKY Davila, CNP

## 2021-06-04 VITALS
WEIGHT: 264.56 LBS | TEMPERATURE: 98.5 F | HEART RATE: 75 BPM | BODY MASS INDEX: 45.17 KG/M2 | DIASTOLIC BLOOD PRESSURE: 75 MMHG | SYSTOLIC BLOOD PRESSURE: 120 MMHG | OXYGEN SATURATION: 99 % | RESPIRATION RATE: 16 BRPM | HEIGHT: 64 IN

## 2021-06-05 NOTE — PROGRESS NOTES
Chief Complaint   Patient presents with     Poss bug bite     x1d, L arm, swelling, itchy       HPI:  Juanita Gonzalez is a 24 y.o. female who presents today with 1 day of possible bug bites on her left arm.  She is a history of cellulitis and other rashes on this arm.  She first noticed an itchy patch and started scratching and noticed it looked like she had a bug bite with a central induration.  It became a little painful and then the area swelled and became erythematous.  She notices 3 other of these lesions scattered on her left forearm.  She did move into a new place of residence and is sleeping on a futon but cleans her sheets regularly.  She is not checked for any bugs.  She states she often this happens to her often and swells after apparent bug bites.  She otherwise feels well with no shortness of breath, cough, mucous membrane involvement, eye irritation or other lesions.  She denies any new detergents, foods, soaps or other potential triggers.  She has been seen by an allergist before and was given an EpiPen for reactions but she is never had any anaphylaxis.  She does state that she has a history of knee pain and also left wrist pain and tightness that comes and goes.    Problem List:  There are no relevant problems documented for this patient.      No past medical history on file.    Current Outpatient Medications on File Prior to Visit   Medication Sig Dispense Refill     levonorgestrel (MIRENA) 20 mcg/24 hours (5 yrs) 52 mg IUD 1 each by Intrauterine route once.       No current facility-administered medications on file prior to visit.         Social History     Tobacco Use     Smoking status: Never Smoker     Smokeless tobacco: Never Used   Substance Use Topics     Alcohol use: Not on file       ROS:  10 point ROS of systems including Constitutional, Eyes, Respiratory, Cardiovascular, Gastroenterology, Genitourinary, Integumentary, Muscularskeletal, Psychiatric were all negative except for pertinent  "positives noted in my HPI and in the PMH.      Vitals:    02/04/20 1600   BP: 120/75   Patient Site: Right Arm   Patient Position: Sitting   Cuff Size: Adult Large   Pulse: 75   Resp: 16   Temp: 98.5  F (36.9  C)   TempSrc: Oral   SpO2: 99%   Weight: (!) 264 lb 9 oz (120 kg)   Height: 5' 4.17\" (1.63 m)       Physical Exam:  General: Alert, No apparent distress, Cooperative.  Obese body habitus  SKIN: For patches of similar presentation.  Areas of hard indurated, erythematous ovoid nodules with definitive central location  And increased erythema.  2 located on wrist, one on dorsal mid forearm and one on ulnar aspect of proximal forearm  HENT: HEAD: ATNC,   EYES: Conjunctiva clear, EOMI, hyperpigmentation below eyes  NECK: Supple,   LUNGS: No respiratory distress,   NUERO: AOx3, normal mentation  PSYCH: normal mood and affect    Radiology:    Xr Chest 2 Views    Result Date: 2/4/2020  EXAM DATE:         02/04/2020 EXAM: X-RAY CHEST, 2 VIEWS, FRONTAL AND LATERAL LOCATION: [facility] DATE/TIME: 2/4/2020 5:15 PM INDICATION: Erythema nodosum COMPARISON: None. IMPRESSION: Negative chest.       Assessment and Plan:  1. Erythema nodosum  XR Chest 2 Views    XR Chest 2 Views      Patient's exam is most consistent with erythema nodosum.  I do not think these are bug bites given the distance between lesions and only 1 of them has a central hole.  They are erythematous, raised and hard.  Patient states she does have some vague chronic issues such as fatigue and joint pain.  This is not consistent with a vasculitis or purpura.  Given patient's age and demographic sarcoidosis could be possible.  Chest x-ray was done but does not appreciate any hilar adenopathy.  Overall we will treat this with NSAIDs and conservative management.  Patient will follow-up with PCP and discuss autoimmune diseases or other causes.  Patient do more lab work and investigation.  Treat with Ibuprofen 600 mg 3-4 x a day  Patient will " return if new symptoms develop or symptoms worsen.    Gaetano Guzmán PA-C

## 2021-06-05 NOTE — PATIENT INSTRUCTIONS - HE
Erythema nodosum    Follow up with PCP and discuss autoimmune diseases or other causes.    Treat with Ibuprofen 600 mg 3-4 x a day

## 2021-06-05 NOTE — TELEPHONE ENCOUNTER
LM for patient detailed that Xray was normal and read the instructions that were given on her AVS for her. Instructed to make apt with PCP for further work up   Thank you

## 2021-06-13 NOTE — TELEPHONE ENCOUNTER
"Coronavirus (COVID-19) Notification    Caller Name (Patient, parent, daughter/son, grandparent, etc)  Patient: Juanita Gonzalez.     Reason for call  Notify of Positive Coronavirus (COVID-19) lab results, assess symptoms,  review  [facility] recommendations    Lab Result    Lab test:  2019-nCoV rRt-PCR or SARS-CoV-2 PCR    Oropharyngeal AND/OR nasopharyngeal swabs is POSITIVE for 2019-nCoV RNA/SARS-COV-2 PCR (COVID-19 virus)    RN Recommendations/Instructions per [facility] Coronavirus COVID-19 recommendations    Brief introduction script  Introduce self and then review script:  \"I am calling on behalf of [employer].  We were notified that your Coronavirus test (COVID-19) for was POSITIVE for the virus.  I have some information to relay to you but first I wanted to mention that the [place] Dept of Health will be contacting you shortly [it's possible MD already called Patient] to talk to you more about how you are feeling and other people you have had contact with who might now also have the virus.  Also, [facility] is Partnering with the [facility] for Covid-19 research, you may be contacted directly by research staff.\"    ssessment (Inquire about Patient's current symptoms)   Assessment   Current Symptoms at time of phone call: (if no symptoms, document No symptoms] Loss of smell and Taste, nasal congestion   Symptom onset (if applicable) 11/06/2020     If at time of call, Patients symptoms hare worsened, the Patient should contact 911 or have someone drive them to Emergency Dept promptly:      If Patient calling 911, inform 911 personal that you have tested positive for the Coronavirus (COVID-19).  Place mask on and await 911 to arrive.    If Emergency Dept, If possible, please have another adult drive you to the Emergency Dept but you need to wear mask when in contact with other people.      Review information with Patient    Your result was positive. This means you have COVID-19 " (coronavirus).  We have sent you a letter that reviews the information that I'll be reviewing with you now.    How can I protect others?    If you have symptoms: stay home and away from others (self-isolate) until:    You've had no fever--and no medicine that reduces fever--for 1 full day (24 hours). And      Your other symptoms have gotten better. For example, your cough or breathing has improved. And     At least 10 days have passed since your symptoms started. (If you ve been told by a doctor that you have a weak immune system, wait 20 days.)     If you don't have symptoms: Stay home and away from others (self-isolate) until at least 10 days have passed since your first positive COVID-19 test. (Date test collected).    During this time:    Stay in your own room, including for meals. Use your own bathroom if you can.    Stay away from others in your home. No hugging, kissing or shaking hands. No visitors.     Don't go to work, school or anywhere else.     Clean  high touch  surfaces often (doorknobs, counters, handles, etc.). Use a household cleaning spray or wipes. You'll find a full list on the EPA website at www.epa.gov/pesticide-registration/list-n-disinfectants-use-against-sars-cov-2.     Cover your mouth and nose with a mask, tissue or other face covering to avoid spreading germs.    Wash your hands and face often with soap and water.    Caregivers in these groups are at risk for severe illness due to COVID-19:  o People 65 years and older  o People who live in a nursing home or long-term care facility  o People with chronic disease (lung, heart, cancer, diabetes, kidney, liver, immunologic)  o People who have a weakened immune system, including those who:  - Are in cancer treatment  - Take medicine that weakens the immune system, such as corticosteroids  - Had a bone marrow or organ transplant  - Have an immune deficiency  - Have poorly controlled HIV or AIDS  - Are obese (body mass index of 40 or  higher)  - Smoke regularly    Caregivers should wear gloves while washing dishes, handling laundry and cleaning bedrooms and bathrooms.    Wash and dry laundry with special caution. Don't shake dirty laundry, and use the warmest water setting you can.    If you have a weakened immune system, ask your doctor about other actions you should take.    For more tips, go to www.cdc.gov/coronavirus/2019-ncov/downloads/10Things.pdf.    You should not go back to work until you meet the guidelines above for ending your home isolation. You don't need to be retested for COVID-19 before going back to work--studies show that you won't spread the virus if it's been at least 10 days since your symptoms started (or 20 days, if you have a weak immune system).    Employers: This document serves as formal notice of your employee's medical guidelines for going back to work. They must meet the above guidelines before going back to work in person.    How can I take care of myself?    1. Get lots of rest. Drink extra fluids (unless a doctor has told you not to).    2. Take Tylenol (acetaminophen) for fever or pain. If you have liver or kidney problems, ask your family doctor if it's okay to take Tylenol.     Take either:     650 mg (two 325 mg pills) every 4 to 6 hours, or     1,000 mg (two 500 mg pills) every 8 hours as needed.     Note: Don't take more than 3,000 mg in one day. Acetaminophen is found in many medicines (both prescribed and over-the-counter medicines). Read all labels to be sure you don't take too much.    For children, check the Tylenol bottle for the right dose (based on their age or weight).    3. If you have other health problems (like cancer, heart failure, an organ transplant or severe kidney disease): Call your specialty clinic if you don't feel better in the next 2 days.    4. Know when to call 911: Emergency warning signs include:    Trouble breathing or shortness of breath    Pain or pressure in the chest that  doesn't go away    Feeling confused like you haven't felt before, or not being able to wake up    Bluish-colored lips or face    5. Sign up for "[employer]. We know it's scary to hear that you have COVID-19. We want to track your symptoms to make sure you're okay over the next 2 weeks. Please look for an email from "[employer]--this is a free, online program that we'll use to keep in touch. To sign up, follow the link in the email. Learn more at [email or web address].    Where can I get more information?    [facility]: [email or web address]    Coronavirus Basics: www.health.[facility].[place]./diseases/coronavirus/basics.html    What to Do If You're Sick: www.cdc.gov/coronavirus/2019-ncov/about/steps-when-sick.html    Ending Home Isolation: www.cdc.gov/coronavirus/2019-ncov/hcp/disposition-in-home-patients.html     Caring for Someone with COVID-19: www.cdc.gov/coronavirus/2019-ncov/if-you-are-sick/care-for-someone.html     [facility] clinical trials (COVID-19 research studies): [email or web address]/[email or web address]-clinical-trials     A Positive COVID-19 letter will be sent via [employer] or the Mail.  (Exception, no letters sent to Presurgerical/Preprocedure Patients)    [Name]  [facility]

## 2021-06-14 ENCOUNTER — ALLIED HEALTH/NURSE VISIT (OUTPATIENT)
Dept: DERMATOLOGY | Facility: CLINIC | Age: 26
End: 2021-06-14

## 2021-06-14 DIAGNOSIS — L70.0 ACNE VULGARIS: Primary | ICD-10-CM

## 2021-06-14 PROCEDURE — 96999 UNLISTED SPEC DERM SVC/PX: CPT | Performed by: DERMATOLOGY

## 2021-06-14 ASSESSMENT — PAIN SCALES - GENERAL: PAINLEVEL: NO PAIN (0)

## 2021-06-14 NOTE — PATIENT INSTRUCTIONS
Skin Peel Post Care Instructions    I will experience redness, swelling, peeling, pain, and heat sensation which can last 5-10days and may persist for 2-3weeks. I may experience itching, or acne. Risks are permanent scarring, temporary or permanent skin lightening or darkening, infection, and eye injury. I understand my outcome could be no improvement or slight improvement. Multiple treatments may be required.     What can I expect?    Skin peeling for three to five days    Flaking    Skin darkening    Redness    How do I take care of my skin?    Patient compliance is mandatory for an optimal outcome and to avoid complications    Wear sunscreen (SPF 30 or greater) and a hat. Stay out of the sun for three to four weeks     Use a gentle skin care regimen with a sunscreen with at least an SPF of 30 or more. Examples include the following:   o SkinCeuticals Gentle Cleanser and SkinCeuticals Physical Matte UV DEFENSE SPF 50   o Cetaphil Soap followed by Cetaphil Sunscreen in the am and pm    Put on a bland moisturizer (Aquaphor or Vaseline) if sunscreen stings    Do not pick at peel or peel the skin. This will cause scarring.    Wait to use medicated creams until the skin has healed. This occurs five to seven days later    You can use make-up after 24 hours. Make sure make-up does NOT contain salicylic acid.     Eye make-up and lipstick are okay immediately after procedure    Do not use facial scrubs, depilatories, steam, any exfoliation procedures, etc. on your face (or treated area of skin) for one week post-peel    When should I call the doctor?    Cold sores    Swelling    Crusting    Who should I call with questions?    [facility]: [phone]     [facility]: [phone]    For urgent needs outside of business hours call the [facility] at [phone] and ask for the dermatology resident on call

## 2021-06-14 NOTE — NURSING NOTE
Juanita Gonzalez comes into clinic today at the request of   Ordering Provider for chemical peel  .  2  Nursing Peel Treatment Record:  Jun 14, 2021    Pre peel:    Any changes in health or medications: No    Strawberry or aspirin allergy (If yes, then no salicylic acid peels to be given and procedure to be held):  No    Sulfa allergy (If yes, then [employer]icals Sensitive Skin peel procedure to be held):  Yes RN spoke with  who advised okay to proceed cautiously with peel     Is the patient taking Valtrex:  No.  If so, date started:  No     Pregnant or breastfeeding (If yes, peel procedure to be held): No    Baseline photo obtained (3 views): Yes    Areas treated today: face     Treatment #: 2.     Was retinoid stopped 7 days prior to peel: Yes    Peel Type: Micropeel plus(20% salicylic acid with 3% glycolic acid)    Has patient had electrolysis, depilatory creams, waxing or laser hair removal in the last week to treatment site: No.  If yes, then hold peel.    Peel record:    Eye shields were placed.    Area to be treated was cleansed with Simply Clean Cleanser using rough 4X4 gauze pads.    Dermaplaning was performed: no.    Area was then toned with with the Conditioning Solution using rough 4X4 gauze pads.    Then, the areas were degreased with acetone. Time-out was performed to evaluate for any sensitive skin.  Hydrabalm was then applied to the lips, perinasal region and near the outer canthi.     The peel was applied with salamanca swabs for 3 passes with the exception of the upper lip area where only one pass was performed due to pt reporting increased burning to that area. Peel  then removed with water dampened 4X4 soft gauze.    CE Ferulic, Phytocorrective gel and epidermal repair topicals were applied and followed by Sunscreen (Sheer Physical UV defence SPF 50).         Post peel:    Patient reminded of need to wait to use medicated creams until the skin has healed. This occurs five  to seven days later. Post peel care instructions provided including need for sun avoidance. Patient tolerated peel well, no complications noted.     Payment:  The patient Paid $125.00 for this peel.     Pt scheduled for another peel in 4 weeks.    This service provided today was under the supervising provider of the day  , who was available if needed.    Ashwini Ware RN

## 2021-06-15 ENCOUNTER — TELEPHONE (OUTPATIENT)
Dept: DERMATOLOGY | Facility: CLINIC | Age: 26
End: 2021-06-15

## 2021-06-15 NOTE — TELEPHONE ENCOUNTER
Pt called to follow up to see how she is doing post chemical peel on 6/14/21. MPP 20% performed and pt experienced normal post peel response. Pt states that she is doing great and has no burning or redness or rash or any other symptoms. Pt states she works today and due to it being hot inside her work with no air conditioning pt is wondering if she should be concerned about her face if she sweats. RN advised bring cool compresses with her to work and apply ice pack wrapped in towel to her face  if skin becomes hot or burns from sweat to keep area calm. Pt states understanding and thanked RN for the follow up...Ashwini Ware RN

## 2021-06-16 NOTE — TELEPHONE ENCOUNTER
Telephone Encounter by Prachi Jung LPN at 2/5/2020  1:47 PM     Author: Prachi Jung LPN Service: -- Author Type: Licensed Nurse    Filed: 2/5/2020  1:52 PM Encounter Date: 2/5/2020 Status: Signed    : Prachi Jung LPN (Licensed Nurse)       Test Results  Who is calling?:  Patient   Who ordered the test:  Gaetano Guzmán PA-C  Type of test: Imaging Chest X-Ray  Date of test:  2/04/2020  Where was the test performed:       [facility]    What are your questions/concerns?:  I want my chest Xray results and follow up plan.    Okay to leave a detailed message?:  Yes Please leave detailed message on my voice mail and I will make sure my first and last name are on my message.

## 2021-06-18 NOTE — PATIENT INSTRUCTIONS - HE
Patient Instructions by Alpa Lopez CNP at 11/9/2020  5:00 PM     Author: Alpa Lopez CNP Service: -- Author Type: Nurse Practitioner    Filed: 11/9/2020  6:02 PM Encounter Date: 11/9/2020 Status: Signed    : Alpa Lopez CNP (Nurse Practitioner)         Patient Education     Controlling Your Asthma  You can do a lot to manage your asthma and improve your quality of life. You will need to work with your healthcare provider to make a plan. But its up to you to put this plan into action.  Why you need to take control  You need to control the inflammation in your lungs to feel well and stay healthy. Take all medicine as directed, especially controller medicines. Take them even if you feel that your asthma is under good control. You also need to ease symptoms when you have them. These are long-term tasks. But the more you stay in control, the better youll feel. If you dont stay in control:    Asthma symptoms may cause you to miss school, work, or activities that you enjoy.    Asthma flare-ups can be dangerous, even deadly.    Uncontrolled asthma makes it more likely that you will need emergency care.    Uncontrolled asthma may cause lasting damage to your lungs.    Peak flow monitoring helps measure how open your airways are.   Taking medicine helps you control your asthma and ease symptoms when they occur.     Using an Asthma Action Plan will help you keep track of and respond to asthma symptoms.   Staying away from triggers--the things that inflame your airways--will help prevent symptoms and flare-ups.   Your action plan  Your healthcare provider will help you prepare, and when needed, update your personal asthma action plan. Your plan tells you what to do based on your current symptoms. If you don't have an asthma action plan, or if yours isn't up-to-date, make sure you talk with your healthcare provider. Keep a journal of your symptoms and triggers. Take your journal and asthma action plan  with you when you visit your healthcare provider. That way, your treatment plan can be reviewed and updated regularly.  Date Last Reviewed: 5/1/2019 2000-2019 [employer]. [address] [place] [address], [place] [address]. All rights reserved. This information is not intended as a substitute for professional medical care. Always follow your healthcare professional's instructions.           Patient Education     Asthma Medicine    Medicines play a key role in controlling asthma. Some help prevent asthma symptoms from getting worse or prevent flare-ups. Others are used to treat symptoms when they occur. Always take your medicine as prescribed. Know the names of your medicines and how and when to use them. If you have any questions about your medicines, talk with your healthcare provider or pharmacist.  Quick-relief medicine  Quick-relief (also called rescue) medicines work by relaxing the muscles around the airways. This helps ease symptoms such as coughing, wheezing, and shortness of breath. Keep your quick-relief inhaler with you at all times. Quick-relief medicines:    Are inhaled when needed and only when needed. Use your quick-relief medicine when you first notice your asthma is getting worse.    Start to open the airways within a few minutes after you use them.    Can help stop a flare-up once it has begun.    May be used before exercise as directed by your healthcare provider.  Long-term control medicine  Long-term control (also called maintenance or controller) medicines help reduce airway swelling and inflammation. Some keep the muscles around your airways relaxed. This makes the airways less sensitive to triggers and less likely to flare up. Long-term control medicines:    Are taken on a schedule. For most people, this is every day. They are taken even when you feel fine.    Help keep asthma under control so youre less likely to have symptoms.    Will not stop a flare-up once it has begun.  Inhaled  corticosteroids  Inhaled corticosteroids are generally safe for long-term use. They often don't cause serious side effects. Thats because theyre inhaled directly into the bronchial tubes in your lungs to open the airways. The chance of minor side effects can be lowered even more if you:    Use a spacer with your inhaler. Ask your healthcare provider or pharmacist about using a spacer if you don't currently use one.    Rinse your mouth, gargle, and spit out the water after using your inhaled corticosteroid medicine.    Follow all instructions for cleaning inhalers and spacers.    Work with your provider to find the lowest dose that controls your asthma.  Tips for taking medicine  Remembering to take medicine each day can be hard for anyone. It can be even harder to remember when you dont have symptoms. Try these tips:    Create a routine. For example, take long-term controllers as part of getting ready for bed, before you brush your teeth in the morning, or both.    Make sure you understand what long-term controllers do and dont do.    Refill your prescriptions on time, or even ahead of time, so you dont run out.    Carry your quick-relief medicine with you. If you can, keep a spare quick-relief inhaler at work, at school, or in your gym bag.    When you travel, make sure you have enough medicine to last for your entire trip.    When traveling by air, keep your medicines with you, not packed in your luggage.    Make sure you know how to tell if your inhaler is empty. Ask your provider or pharmacist. Or check the instructions that come with your inhaler.  Working with your healthcare provider  By working with your healthcare provider, you can get the most benefit from your medicine. Talk with your provider about:    Getting the right dose. Over time, your healthcare provider may raise or lower the dose of your controller medicine. The goal is to find the amount of medicine to keep asthma in control, without taking  more than is needed.    Finding the right medicines for you. Each person is unique. It may take a few tries to find the right medicine or combination of medicines for you. If one medicine doesnt work well for you, another may work better.    Reducing side effects. If you have side effects, dont just stop taking your medicine. Instead, call your healthcare provider. A new medicine or change in the amount of medicine may solve the problem.  Date Last Reviewed: 10/1/2016    9892-8622 [employer]. [address] [place] [address]. All rights reserved. This information is not intended as a substitute for professional medical care. Always follow your healthcare professional's instructions.           Patient Education     Coronavirus Disease 2019 (COVID-19): Caring for Yourself or Others   If you or a household member have symptoms of COVID-19, follow the guidelines below. This will help you manage symptoms and keep the virus from spreading.  If you have symptoms of COVID-19    Stay home and contact your care team. They will tell you what to do.    Dont panic. Keep in mind that other illnesses can cause similar symptoms.    Stay away from work, school, and public places.    Limit physical contact with others in your home. Limit visitors. No kissing.  Clean surfaces you touch with disinfectant.  If you need to cough or sneeze, do it into a tissue. Then throw the tissue into the trash. If you don't have tissues, cough or sneeze into the bend of your elbow.  Dont share food or personal items with people in your household. This includes items like eating and drinking utensils, towels, and bedding.  Wear a cloth face mask around other people. During a public health emergency, medical face masks may be reserved for healthcare workers. You may need to make a cloth face mask of your own. You can do this using a bandana, T-shirt, or other cloth. The CDC has instructions on how to make a face mask. Wear the  mask so that it covers both your nose and mouth.  If you need to go to a hospital or clinic, call ahead to let them know. Expect the care team to wear masks, gowns, gloves, and eye protection. You may be put in a separate room.  Follow all instructions from your care team.    If youve been diagnosed with COVID-19    Stay home and away from others, including other people in your home. (This is called self-isolation.) Dont leave home unless you need to get medical care. Dont go to work, school, or public places. Dont use buses, taxis, or other public transportation.    Follow all instructions from your care team.    If you need to go to a hospital or clinic, call ahead to let them know. Expect the care team to wear masks, gowns, gloves, and eye protection. You may be put in a separate room.    Wear a face mask over your nose and mouth. This is to protect others from your germs. If you cant wear a mask, your caregivers should wear one. You may need to make your own mask using a bandana, T-shirt, or other cloth. See the [facility] instructions on how to make a face mask.    Have no contact with pets and other animals.    Dont share food or personal items with people in your household. This includes items like eating and drinking utensils, towels, and bedding.    If you need to cough or sneeze, do it into a tissue. Then throw the tissue into the trash. If you don't have tissues, cough or sneeze into the bend of your elbow.    Wash your hands often.    Self-care at home   At this time, there is no medicine approved to prevent or treat COVID-19. Experts are testing different medicines, trying to find one that works.  So far, the most proven treatment is to support your body while it fights the virus.    Getting extra rest.    Drink extra fluids 6 to 8 glasses of liquids each day), unless a doctor has told you not to. Ask your care team which fluids are best for you. Avoid fluids that contain caffeine or alcohol.    Taking  over-the-counter (OTC) medicine to reduce pain and fever. Your care team will tell you which medicine to use.  If youve been in the hospital for COVID-19, follow your care teams instructions. They will tell you when to stop self-isolation. They may also have you change positions to help your breathing (such as lying on your belly).  If a test showed that you have COVID-19, you may be asked to donate plasma after youve recovered. (This is called COVID-19 convalescent plasma donation.) The plasma may contain antibodies to help fight the virus in others. Scientists are testing whether this might be a treatment in the future. For more information, talk to your care team.  Caring for a sick person     Follow all instructions from the care team.    Wash your hands often.    Wear protective clothing as advised.    Make sure the sick person wears a mask. If they can't wear a mask, dont stay in the same room with them. If you must be in the same room, wear a face mask. Make sure the mask covers both the nose and mouth.    Keep track of the sick persons symptoms.    Clean surfaces often with disinfectant. This includes phones, kitchen counters, fridge door handles, bathroom surfaces, and others.    Dont let anyone share household items with the sick person. This includes eating and drinking tools, towels, sheets, or blankets.    Clean fabrics and laundry well.    Keep other people and pets away from the sick person.    When you can stop self-isolation  When you are sick with COVID-19, you should stay away from other people. This is called self-isolation. The rules for ending self-isolation depend on your health in general.  If you are normally healthy  You can stop self-isolation when all 3 of these are true:    Youve had no fever--and no medicine that reduces fever--for at least 24 hours. And?    Your symptoms are better, such as cough or trouble breathing. And?    At least 10 days have passed since your symptoms first  started.  Talk with your care team before you leave home. They may tell you its okay to leave, or they may give you different advice.  If you have a weak immune system  If youre being treated for cancer, have an immune disorder (such as HIV), or have had a transplant &$40;organ or bone marrow), follow your care teams instructions. You may be asked to stay home from 10 days to 20 days after your symptoms first started. Your care team may want to re-test you for COVID-19.  When you return to public settings  When you are well enough to go outside your home, follow the CDCs guidance on cloth face masks.    Anyone over age 2 should wear a face mask in public, especially when it's hard to socially distance. This includes public transit, public protests and marches, and crowded stores, bars, and restaurants.    Face masks are most likely to reduce the spread of COVID-19 when they are widely used by people who are out in the public.  Certain people should not wear a face covering. These include:    Children under 2 years old    Anyone with a health, developmental, or mental health condition that can be made worse by wearing a mask    Anyone who is unconscious or unable to remove the face covering without help. See the CDC's guidance on who should not wear a face mask.  When to call your care team  Call your care team right away if a sick person has any of these:    Trouble breathing    Pain or pressure in chest  If a sick person has any of these, call 911:    Trouble breathing that gets worse    Pain or pressure in chest that gets worse    Blue tint to lips or face    Fast or irregular heartbeat    Confusion or trouble waking    Fainting or loss of consciousness    Coughing up blood  Going home from the hospital   If you have COVID-19 and were recently in the hospital:    Follow the instructions above for self-care and isolation.    Follow the hospital care teams instructions.    Ask questions if anything is unclear to  you. Write down answers so you remember them.  Date last modified: 10/13/2020  Alan last reviewed this educational content on 4/1/2020  This information has been modified by your health care provider with permission from the publisher.    4599-5457 [employer]. [address] [place] [address], [place] [address]. All rights reserved. This information is not intended as a substitute for professional medical care. Always follow your healthcare professional's instructions.

## 2021-06-20 ENCOUNTER — COMMUNICATION - HEALTHEAST (OUTPATIENT)
Dept: SCHEDULING | Facility: CLINIC | Age: 26
End: 2021-06-20

## 2021-06-21 ENCOUNTER — VIRTUAL VISIT (OUTPATIENT)
Dept: DERMATOLOGY | Facility: CLINIC | Age: 26
End: 2021-06-21
Payer: COMMERCIAL

## 2021-06-21 DIAGNOSIS — L70.0 ACNE VULGARIS: ICD-10-CM

## 2021-06-21 PROCEDURE — 99213 OFFICE O/P EST LOW 20 MIN: CPT | Mod: 95 | Performed by: PHYSICIAN ASSISTANT

## 2021-06-21 RX ORDER — TRETINOIN 0.5 MG/G
CREAM TOPICAL AT BEDTIME
Qty: 45 G | Refills: 3 | Status: SHIPPED | OUTPATIENT
Start: 2021-06-21 | End: 2023-01-17

## 2021-06-21 ASSESSMENT — PAIN SCALES - GENERAL: PAINLEVEL: NO PAIN (0)

## 2021-06-21 NOTE — NURSING NOTE
Dermatology Rooming Note    Zechariah Gonzalez's goals for this visit include:   Chief Complaint   Patient presents with     Derm Problem     zechariah is having this visit today for a follow up on acne, states that she has been seeing some improvement      Melonie Silva CMA on 6/21/2021 at 10:43 AM

## 2021-06-21 NOTE — LETTER
Letter by Alpa Lopez CNP at      Author: Alpa Lopez CNP Service: -- Author Type: --    Filed:  Encounter Date: 11/9/2020 Status: (Other)         November 9, 2020     Patient: Juanita Gonzalez   YOB: 1995   Date of Visit: 11/9/2020       To Whom It May Concern:    It is my medical opinion that Juanita Gonzalez should remain out of work until COVID negative AND Asymptomatic, on 11/23/2020..    If you have any questions or concerns, please don't hesitate to call.    Sincerely,        Electronically signed by Alpa Lopez CNP

## 2021-06-21 NOTE — LETTER
6/21/2021       RE: Juanita Gonzalez  [id number]  [facility] [address]     Dear Colleague,    Thank you for referring your patient, Juanita Gonzalez, to the [facility] [place] at [facility]. Please see a copy of my visit note below.    [facility] Dermatology Note  Encounter Date: Jun 21, 2021  [employer] and Telephone ([phone]). Location of teledermatologist: [facility] [place].  Start time: 11:09 End time: 11:26 am    Dermatology Problem List:  1. Acne vulgaris-  Spironolactone 25 mg daily, s/p 50 mg bid 3/15/21,(due to dizziness)  Clindamycin 1% lotion daily 3/15/21   Start tretinoin 0.025% cream at bedtime 12/14/21   Tretinoin 0.05% cream 6/21/21   2 Post inflammatory hyperpigmentation-  Peels at [facility]   hydroquinone 4% cream daily and every other night for two months. Then take a one month break. (pt not started this yet)    ____________________________________________    Assessment & Plan:     Acne vulgaris,  inflammatory and hormonal, previously Not controlled on topical retinoids. Doing well on current regiment with low dose spironolactone.   Continue clindamycin 1% lotion daily.                 -Continue spironolactone  25 mg daily  Blood pressure checked on 11/14/20. Labs checked that day including CMP.               -Counseled on side effects of spironolactone including lightheadedness, urinary frequency, spotting between periods and breast tenderness.              -Counseled that spironolactone may take 3-4 months to take clinical effect.               -Counseled that spironolactone can cause feminization to a male fetus and should avoid pregnancy. She has an IUD.      - Continue tretinoin 0.025% cream at bedtime. Retinoid ed given. Can increase gradually to 0.05% cream at bedtime.      2. Post inflammatory hyperpigmentation. Improving.     Continue peels per cosmetic derm.   Sun protection: Counseled SPF30+ sunscreen.      Procedures Performed:    None    Follow-up: 3 month(s) virtually (telephone with photos), or earlier for new or changing lesions    Staff:     All risks, benefits and alternatives were discussed with patient.  Patient is in agreement and understands the assessment and plan.  All questions were answered.  Sun Screen Education was given.   Return to Clinic in 3 months or sooner as needed.   Alyssa Becerra PA-C   ____________________________________________    CC: Derm Problem (zechariah is having this visit today for a follow up on acne, states that she has been seeing some improvement )    HPI:  Ms. Zechariah Gonzalez is a(n) 25 year old female who presents today as a return patient for acne. She was last seen by me on 3/15/21 when she was started on tretinoin 0.025% cream at bedtime, clindamycin 1% lotion and spironolactone 50 mg bid. She was also recommended to start doing peels with cosmetic dermatology to help with post inflammatory hyperpigmentation. She states she got dizzy with the spironolactone. Her PMD recommended she take only 25 mg daily. She was off for about 4 weeks before restarting this medication. She is feeling well on it now without notable side effects. She is seeming improvement in her acne and darker marks and is happy. She is diligent about sun protection.     Patient is otherwise feeling well, without additional skin concerns.    Labs Reviewed:  Last Comprehensive Metabolic Panel:  Sodium   Date Value Ref Range Status   11/14/2020 139 133 - 144 mmol/L Final     Potassium   Date Value Ref Range Status   11/14/2020 3.8 3.4 - 5.3 mmol/L Final     Chloride   Date Value Ref Range Status   11/14/2020 107 94 - 109 mmol/L Final     Carbon Dioxide   Date Value Ref Range Status   11/14/2020 27 20 - 32 mmol/L Final     Anion Gap   Date Value Ref Range Status   11/14/2020 5 3 - 14 mmol/L Final     Glucose   Date  Value Ref Range Status   11/14/2020 81 70 - 99 mg/dL Final     Urea Nitrogen   Date Value Ref Range Status   11/14/2020 4 (L) 7 - 30 mg/dL Final     Creatinine   Date Value Ref Range Status   11/14/2020 0.78 0.52 - 1.04 mg/dL Final     GFR Estimate   Date Value Ref Range Status   11/14/2020 >90 >60 mL/min/[1.73_m2] Final     Comment:     Non  GFR Calc  Starting 12/18/2018, serum creatinine based estimated GFR (eGFR) will be   calculated using the Chronic Kidney Disease Epidemiology Collaboration   (CKD-EPI) equation.       Calcium   Date Value Ref Range Status   11/14/2020 9.4 8.5 - 10.1 mg/dL Final     Bilirubin Total   Date Value Ref Range Status   11/14/2020 0.2 0.2 - 1.3 mg/dL Final     Alkaline Phosphatase   Date Value Ref Range Status   11/14/2020 92 40 - 150 U/L Final     ALT   Date Value Ref Range Status   11/14/2020 31 0 - 50 U/L Final     AST   Date Value Ref Range Status   11/14/2020 20 0 - 45 U/L Final               Physical Exam:  Vitals: There were no vitals taken for this visit.  SKIN: Teledermatology photos were reviewed; image quality and interpretability: Acceptable. Image date: 06/21/21  - Faint hyperpigmented macules on the chin and chin without notable papules.   - No other lesions of concern on areas examined.     Medications:  Current Outpatient Medications   Medication     amphetamine-dextroamphetamine (ADDERALL XR) 10 MG 24 hr capsule     cetirizine (ZYRTEC) 10 MG tablet     clindamycin (CLEOCIN T) 1 % external lotion     EPINEPHrine (EPIPEN/ADRENACLICK/OR ANY BX GENERIC EQUIV) 0.3 MG/0.3ML injection 2-pack     famotidine (PEPCID) 20 MG tablet     levonorgestrel (MIRENA, 52 MG,) 20 MCG/24HR IUD     loratadine (CLARITIN) 10 MG tablet     montelukast (SINGULAIR) 10 MG tablet     tretinoin (RETIN-A) 0.025 % external cream     albuterol (PROVENTIL) (2.5 MG/3ML) 0.083% neb solution     fluticasone-salmeterol (ADVAIR DISKUS) 250-50 MCG/DOSE inhaler     spironolactone (ALDACTONE) 50  MG tablet     No current facility-administered medications for this visit.       Past Medical/Surgical History:   Patient Active Problem List   Diagnosis     Obesity     Attention deficit hyperactivity disorder (ADHD)     Disturbance in sleep behavior     Acanthosis nigricans     Acne     Seasonal allergies     Eczema     Vitamin D deficiency     Insulin resistance     Anisometropia     Dysmetabolic syndrome X     Myopia     Enchondroma of wrist or hand     Osteoma     Café au lait spot     Neck pain     Bilateral thoracic back pain     Morbid obesity (H)     Past Medical History:   Diagnosis Date     Allergic conjunctivitis      Anisometropia      Asthma      Attention deficit disorder with hyperactivity(314.01)     stopped medication 1 year ago     Myopia      Nonorganic enuresis     voiding dysfunction      Obesity, unspecified      Wears contact lenses        CC Referred Self, MD  No address on file on close of this encounter.

## 2021-06-21 NOTE — PROGRESS NOTES
[facility] Dermatology Note  Encounter Date: Jun 21, 2021  [employer]and[employer] and Telephone ([phone]). Location of teledermatologist: [facility] [place].  Start time: 11:09 End time: 11:26 am    Dermatology Problem List:  1. Acne vulgaris-  Spironolactone 25 mg daily, s/p 50 mg bid 3/15/21,(due to dizziness)  Clindamycin 1% lotion daily 3/15/21   Start tretinoin 0.025% cream at bedtime 12/14/21   Tretinoin 0.05% cream 6/21/21   2 Post inflammatory hyperpigmentation-  Peels at [facility]   hydroquinone 4% cream daily and every other night for two months. Then take a one month break. (pt not started this yet)    ____________________________________________    Assessment & Plan:     Acne vulgaris,  inflammatory and hormonal, previously Not controlled on topical retinoids. Doing well on current regiment with low dose spironolactone.   Continue clindamycin 1% lotion daily.                 -Continue spironolactone  25 mg daily  Blood pressure checked on 11/14/20. Labs checked that day including CMP.               -Counseled on side effects of spironolactone including lightheadedness, urinary frequency, spotting between periods and breast tenderness.              -Counseled that spironolactone may take 3-4 months to take clinical effect.               -Counseled that spironolactone can cause feminization to a male fetus and should avoid pregnancy. She has an IUD.      - Continue tretinoin 0.025% cream at bedtime. Retinoid ed given. Can increase gradually to 0.05% cream at bedtime.      2. Post inflammatory hyperpigmentation. Improving.    Continue peels per cosmetic derm.   Sun protection: Counseled SPF30+ sunscreen.      Procedures Performed:    None    Follow-up: 3 month(s) virtually (telephone with photos), or earlier for new or changing lesions    Staff:     All risks, benefits and alternatives were discussed with patient.  Patient is in agreement and understands  the assessment and plan.  All questions were answered.  Sun Screen Education was given.   Return to Clinic in 3 months or sooner as needed.   Alyssa Becerra PA-C   ____________________________________________    CC: Derm Problem (zechariah is having this visit today for a follow up on acne, states that she has been seeing some improvement )    HPI:  Ms. Zechariah Gonzalez is a(n) 25 year old female who presents today as a return patient for acne. She was last seen by me on 3/15/21 when she was started on tretinoin 0.025% cream at bedtime, clindamycin 1% lotion and spironolactone 50 mg bid. She was also recommended to start doing peels with cosmetic dermatology to help with post inflammatory hyperpigmentation. She states she got dizzy with the spironolactone. Her PMD recommended she take only 25 mg daily. She was off for about 4 weeks before restarting this medication. She is feeling well on it now without notable side effects. She is seeming improvement in her acne and darker marks and is happy. She is diligent about sun protection.     Patient is otherwise feeling well, without additional skin concerns.    Labs Reviewed:  Last Comprehensive Metabolic Panel:  Sodium   Date Value Ref Range Status   11/14/2020 139 133 - 144 mmol/L Final     Potassium   Date Value Ref Range Status   11/14/2020 3.8 3.4 - 5.3 mmol/L Final     Chloride   Date Value Ref Range Status   11/14/2020 107 94 - 109 mmol/L Final     Carbon Dioxide   Date Value Ref Range Status   11/14/2020 27 20 - 32 mmol/L Final     Anion Gap   Date Value Ref Range Status   11/14/2020 5 3 - 14 mmol/L Final     Glucose   Date Value Ref Range Status   11/14/2020 81 70 - 99 mg/dL Final     Urea Nitrogen   Date Value Ref Range Status   11/14/2020 4 (L) 7 - 30 mg/dL Final     Creatinine   Date Value Ref Range Status   11/14/2020 0.78 0.52 - 1.04 mg/dL Final     GFR Estimate   Date Value Ref Range Status   11/14/2020 >90 >60 mL/min/[1.73_m2] Final     Comment:      Non  GFR Calc  Starting 12/18/2018, serum creatinine based estimated GFR (eGFR) will be   calculated using the Chronic Kidney Disease Epidemiology Collaboration   (CKD-EPI) equation.       Calcium   Date Value Ref Range Status   11/14/2020 9.4 8.5 - 10.1 mg/dL Final     Bilirubin Total   Date Value Ref Range Status   11/14/2020 0.2 0.2 - 1.3 mg/dL Final     Alkaline Phosphatase   Date Value Ref Range Status   11/14/2020 92 40 - 150 U/L Final     ALT   Date Value Ref Range Status   11/14/2020 31 0 - 50 U/L Final     AST   Date Value Ref Range Status   11/14/2020 20 0 - 45 U/L Final               Physical Exam:  Vitals: There were no vitals taken for this visit.  SKIN: Teledermatology photos were reviewed; image quality and interpretability: Acceptable. Image date: 06/21/21  - Faint hyperpigmented macules on the chin and chin without notable papules.   - No other lesions of concern on areas examined.     Medications:  Current Outpatient Medications   Medication     amphetamine-dextroamphetamine (ADDERALL XR) 10 MG 24 hr capsule     cetirizine (ZYRTEC) 10 MG tablet     clindamycin (CLEOCIN T) 1 % external lotion     EPINEPHrine (EPIPEN/ADRENACLICK/OR ANY BX GENERIC EQUIV) 0.3 MG/0.3ML injection 2-pack     famotidine (PEPCID) 20 MG tablet     levonorgestrel (MIRENA, 52 MG,) 20 MCG/24HR IUD     loratadine (CLARITIN) 10 MG tablet     montelukast (SINGULAIR) 10 MG tablet     tretinoin (RETIN-A) 0.025 % external cream     albuterol (PROVENTIL) (2.5 MG/3ML) 0.083% neb solution     fluticasone-salmeterol (ADVAIR DISKUS) 250-50 MCG/DOSE inhaler     spironolactone (ALDACTONE) 50 MG tablet     No current facility-administered medications for this visit.       Past Medical/Surgical History:   Patient Active Problem List   Diagnosis     Obesity     Attention deficit hyperactivity disorder (ADHD)     Disturbance in sleep behavior     Acanthosis nigricans     Acne     Seasonal allergies     Eczema     Vitamin D  deficiency     Insulin resistance     Anisometropia     Dysmetabolic syndrome X     Myopia     Enchondroma of wrist or hand     Osteoma     Café au lait spot     Neck pain     Bilateral thoracic back pain     Morbid obesity (H)     Past Medical History:   Diagnosis Date     Allergic conjunctivitis      Anisometropia      Asthma      Attention deficit disorder with hyperactivity(314.01)     stopped medication 1 year ago     Myopia      Nonorganic enuresis     voiding dysfunction      Obesity, unspecified      Wears contact lenses        CC Referred Self, MD  No address on file on close of this encounter.

## 2021-06-23 ENCOUNTER — OFFICE VISIT (OUTPATIENT)
Dept: INTERNAL MEDICINE | Facility: CLINIC | Age: 26
End: 2021-06-23
Payer: COMMERCIAL

## 2021-06-23 VITALS
BODY MASS INDEX: 40.39 KG/M2 | WEIGHT: 235.3 LBS | HEART RATE: 82 BPM | DIASTOLIC BLOOD PRESSURE: 79 MMHG | SYSTOLIC BLOOD PRESSURE: 115 MMHG | OXYGEN SATURATION: 99 %

## 2021-06-23 DIAGNOSIS — M54.2 CERVICALGIA: ICD-10-CM

## 2021-06-23 DIAGNOSIS — G44.219 EPISODIC TENSION-TYPE HEADACHE, NOT INTRACTABLE: ICD-10-CM

## 2021-06-23 DIAGNOSIS — M54.6 CHRONIC MIDLINE THORACIC BACK PAIN: Primary | ICD-10-CM

## 2021-06-23 DIAGNOSIS — R19.5 LOOSE STOOLS: ICD-10-CM

## 2021-06-23 DIAGNOSIS — R42 VERTIGO: ICD-10-CM

## 2021-06-23 DIAGNOSIS — G89.29 CHRONIC MIDLINE THORACIC BACK PAIN: Primary | ICD-10-CM

## 2021-06-23 PROCEDURE — 99215 OFFICE O/P EST HI 40 MIN: CPT | Performed by: NURSE PRACTITIONER
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
## 2021-06-23 NOTE — NURSING NOTE
Chief Complaint   Patient presents with     Establish Care     Back Pain     left sided neck/upper back pain, 7/10 and constant, felt cracking noise prior to worsening x3 years ago, chiropractor referral (has been before in [place])       Gregoria Philip, EMT at 9:29 AM on 6/23/2021

## 2021-06-26 NOTE — TELEPHONE ENCOUNTER
Patient reports that yesterday she felt like she had something stuck in her throat.  Reports today there is a little white stone thing on one of her tonsils.      Has tried gargling with salt water and nothing will get it to move.      Throat hurts, feels like it is hard to swallow.     Feels like something is stuck in her throat.   States that she can see it an it is on the left side.     Eating and drinking ok.     Denies fever.     Home care advised per protocol.     Hannah Keane RN/[facility]    COVID 19 Nurse Triage Plan/Patient Instructions    Please be aware that novel coronavirus (COVID-19) may be circulating in the community. If you develop symptoms such as fever, cough, or SOB or if you have concerns about the presence of another infection including coronavirus (COVID-19), please contact your health care provider or visit  [email or web address].    Disposition/Instructions    Home care recommended. Follow home care protocol based instructions.    Thank you for taking steps to prevent the spread of this virus.  o Limit your contact with others.  o Wear a simple mask to cover your cough.  o Wash your hands well and often.    Resources    [facility]: About COVID-19: [email or web address]    CDC: What to Do If You're Sick: www.cdc.gov/coronavirus/2019-ncov/about/steps-when-sick.html    CDC: Ending Home Isolation: www.cdc.gov/coronavirus/2019-ncov/hcp/disposition-in-home-patients.html     CDC: Caring for Someone: www.cdc.gov/coronavirus/2019-ncov/if-you-are-sick/care-for-someone.html     [facility]: Interim Guidance for Hospital Discharge to Home: [email or web address]/coronavirus/hcp/hospdischarge.pdf    [facility] clinical trials (COVID-19 research studies): [email or web address]     Below are the COVID-19 hotlines at the [place] Department of Health ([facility]). Interpreters are available.   o For health questions: Call  "[phone] or 1-556.290.4727 (7 a.m. to 7 p.m.)  o For questions about schools and childcare: Call 312-915-2645 or 1-728.745.3012 (7 a.m. to 7 p.m.)     Reason for Disposition    [1] Sore throat is the only symptom AND [2] sore throat present < 48 hours    Additional Information    Negative: Severe difficulty breathing (e.g., struggling for each breath, speaks in single words, stridor)    Negative: Sounds like a life-threatening emergency to the triager    Negative: [1] Diagnosed strep throat AND [2] taking antibiotic AND [3] symptoms continue    Negative: Throat culture results, call about    Negative: Productive cough is main symptom    Negative: Non-productive cough is main symptom    Negative: Hoarseness is main symptom    Negative: Runny nose is main symptom    Negative: [1] Drooling or spitting out saliva (because can't swallow) AND [2] normal breathing    Negative: Unable to open mouth completely    Negative: [1] Difficulty breathing AND [2] not severe    Negative: Fever > 104 F (40 C)    Negative: [1] Refuses to drink anything AND [2] for > 12 hours    Negative: [1] Drinking very little AND [2] dehydration suspected (e.g., no urine > 12 hours, very dry mouth, very lightheaded)    Negative: Patient sounds very sick or weak to the triager    Negative: SEVERE (e.g., excruciating) throat pain    Negative: [1] Pus on tonsils (back of throat) AND [2]  fever AND [3] swollen neck lymph nodes (\"glands\")    Negative: [1] Rash AND [2] widespread (especially chest and abdomen)    Negative: Earache also present    Negative: Fever present > 3 days (72 hours)    Negative: Diabetes mellitus or weak immune system (e.g., HIV positive, cancer chemo, splenectomy, organ transplant, chronic steroids)    Negative: History of rheumatic fever    Negative: [1] Adult is leaving on a trip AND [2] requests an antibiotic NOW    Negative: [1] Positive throat culture or rapid strep test (according to lab, PCP, caller, etc.) AND [2] NO  " standing order to call in prescription for antibiotic    Negative: [1] Exposure to family member (or spouse or boyfriend/girlfriend) with test-proven strep AND [2] within last 10 days    Negative: [1] Sore throat is the only symptom AND [2] present > 48 hours    Negative: [1] Sore throat with cough/cold symptoms AND [2] present > 5 days    Protocols used: SORE THROAT-A-AH

## 2021-06-29 NOTE — PROGRESS NOTES
Progress Notes by Alpa Lopez CNP at 11/9/2020  5:00 PM     Author: Alpa Lopez CNP Service: -- Author Type: Nurse Practitioner    Filed: 11/9/2020  6:27 PM Encounter Date: 11/9/2020 Status: Signed    : Alpa Lopez CNP (Nurse Practitioner)       Chief Complaint   Patient presents with   ? Cough     x3d, dry cough, HA, ST, lightheadedness, dizziness, SOB, chest tightness, feels run down, lost of taste and smell, very congested, low grade fever        HPI:  Juanita Gonzalez is a 25 y.o. female with asthma hx, who presents today complaining of initial symptoms of sore throat, with worsening headache, fatigue, shortness of breath, chest tightness, significant nasal congestion, low grade fever 99.3F and now loss of taste and smell; although sore throat mildly improved. Reports taking immunity shots, vapor drops, and nasal saline drops with minimal relief.     History obtained from the patient.  LMP: IUD in place, amenorrhea    Problem List:  2020-11: Morbid obesity (H)      No past medical history on file.    Social History     Tobacco Use   ? Smoking status: Never Smoker   ? Smokeless tobacco: Never Used   Substance Use Topics   ? Alcohol use: Not on file       Review of Systems   Constitutional: Positive for activity change, appetite change, chills, fatigue and fever.   HENT: Positive for congestion, rhinorrhea and sore throat. Negative for trouble swallowing.         Loss of smell and taste   Respiratory: Positive for cough and shortness of breath. Negative for wheezing.    Gastrointestinal: Negative for diarrhea, nausea and vomiting.   Genitourinary: Negative for dysuria.   Musculoskeletal: Negative for myalgias.   Skin: Negative for rash.   Neurological: Positive for headaches.   All other systems reviewed and are negative.      Vitals:    11/09/20 1725   BP: 111/78   Patient Site: Right Arm   Patient Position: Sitting   Cuff Size: Adult Large   Pulse: 77   Resp: 16   Temp: 98.4  F (36.9   C)   TempSrc: Oral   SpO2: 99%       Physical Exam  Constitutional:       General: She is not in acute distress.     Appearance: Normal appearance. She is not ill-appearing, toxic-appearing or diaphoretic.   HENT:      Head: Normocephalic and atraumatic.      Right Ear: Tympanic membrane, ear canal and external ear normal.      Left Ear: Tympanic membrane, ear canal and external ear normal.      Nose: Congestion present. No rhinorrhea.      Mouth/Throat:      Mouth: Mucous membranes are moist.      Pharynx: No posterior oropharyngeal erythema.   Eyes:      General:         Right eye: No discharge.         Left eye: No discharge.      Extraocular Movements: Extraocular movements intact.      Conjunctiva/sclera: Conjunctivae normal.      Pupils: Pupils are equal, round, and reactive to light.   Neck:      Musculoskeletal: Neck supple.   Cardiovascular:      Rate and Rhythm: Normal rate and regular rhythm.      Pulses: Normal pulses.      Heart sounds: Normal heart sounds.   Pulmonary:      Effort: Pulmonary effort is normal.      Breath sounds: Normal breath sounds. No wheezing.   Lymphadenopathy:      Cervical: No cervical adenopathy.   Skin:     General: Skin is warm.      Capillary Refill: Capillary refill takes less than 2 seconds.      Coloration: Skin is not pale.      Findings: No erythema or rash.   Neurological:      General: No focal deficit present.      Mental Status: She is alert and oriented to person, place, and time. Mental status is at baseline.   Psychiatric:         Mood and Affect: Mood normal.         Behavior: Behavior normal.         No notes on file    Labs:  No results found for this or any previous visit (from the past 72 hour(s)).    Radiology:None obtained     Clinical Decision Making: At the end of the encounter, I discussed results, diagnosis, medications. Given clinical presentation and concerns of COVID exposures with asthma hx and working healthcare setting, COVID testing completed,  reviewed quarantine precautions and letter for work provided. Reviewed importance of monitoring respiratory symptoms with asthma hx, fever control and respiratory red flags that would warrant further immediate return for reevaluation.Encouraged use of albuterol inhaler. Patient understood and agreed to plan.    PINKY Vidal, CNP       1. Exposure to COVID-19 virus  Symptomatic COVID-19 Virus (CORONAVIRUS) PCR   2. Sore throat  Symptomatic COVID-19 Virus (CORONAVIRUS) PCR   3. Morbid obesity (H)     4. Mild intermittent asthma with exacerbation  albuterol (PROAIR HFA;PROVENTIL HFA;VENTOLIN HFA) 90 mcg/actuation inhaler    DISCONTINUED: albuterol (PROAIR HFA;PROVENTIL HFA;VENTOLIN HFA) 90 mcg/actuation inhaler         Patient Instructions       Patient Education     Controlling Your Asthma  You can do a lot to manage your asthma and improve your quality of life. You will need to work with your healthcare provider to make a plan. But its up to you to put this plan into action.  Why you need to take control  You need to control the inflammation in your lungs to feel well and stay healthy. Take all medicine as directed, especially controller medicines. Take them even if you feel that your asthma is under good control. You also need to ease symptoms when you have them. These are long-term tasks. But the more you stay in control, the better youll feel. If you dont stay in control:    Asthma symptoms may cause you to miss school, work, or activities that you enjoy.    Asthma flare-ups can be dangerous, even deadly.    Uncontrolled asthma makes it more likely that you will need emergency care.    Uncontrolled asthma may cause lasting damage to your lungs.    Peak flow monitoring helps measure how open your airways are.   Taking medicine helps you control your asthma and ease symptoms when they occur.     Using an Asthma Action Plan will help you keep track of and respond to asthma symptoms.   Staying away from  triggers--the things that inflame your airways--will help prevent symptoms and flare-ups.   Your action plan  Your healthcare provider will help you prepare, and when needed, update your personal asthma action plan. Your plan tells you what to do based on your current symptoms. If you don't have an asthma action plan, or if yours isn't up-to-date, make sure you talk with your healthcare provider. Keep a journal of your symptoms and triggers. Take your journal and asthma action plan with you when you visit your healthcare provider. That way, your treatment plan can be reviewed and updated regularly.  Date Last Reviewed: 5/1/2019 2000-2019 [employer]. [address] [place] [address], [place] [address]. All rights reserved. This information is not intended as a substitute for professional medical care. Always follow your healthcare professional's instructions.           Patient Education     Asthma Medicine    Medicines play a key role in controlling asthma. Some help prevent asthma symptoms from getting worse or prevent flare-ups. Others are used to treat symptoms when they occur. Always take your medicine as prescribed. Know the names of your medicines and how and when to use them. If you have any questions about your medicines, talk with your healthcare provider or pharmacist.  Quick-relief medicine  Quick-relief (also called rescue) medicines work by relaxing the muscles around the airways. This helps ease symptoms such as coughing, wheezing, and shortness of breath. Keep your quick-relief inhaler with you at all times. Quick-relief medicines:    Are inhaled when needed and only when needed. Use your quick-relief medicine when you first notice your asthma is getting worse.    Start to open the airways within a few minutes after you use them.    Can help stop a flare-up once it has begun.    May be used before exercise as directed by your healthcare provider.  Long-term control medicine  Long-term control  (also called maintenance or controller) medicines help reduce airway swelling and inflammation. Some keep the muscles around your airways relaxed. This makes the airways less sensitive to triggers and less likely to flare up. Long-term control medicines:    Are taken on a schedule. For most people, this is every day. They are taken even when you feel fine.    Help keep asthma under control so youre less likely to have symptoms.    Will not stop a flare-up once it has begun.  Inhaled corticosteroids  Inhaled corticosteroids are generally safe for long-term use. They often don't cause serious side effects. Thats because theyre inhaled directly into the bronchial tubes in your lungs to open the airways. The chance of minor side effects can be lowered even more if you:    Use a spacer with your inhaler. Ask your healthcare provider or pharmacist about using a spacer if you don't currently use one.    Rinse your mouth, gargle, and spit out the water after using your inhaled corticosteroid medicine.    Follow all instructions for cleaning inhalers and spacers.    Work with your provider to find the lowest dose that controls your asthma.  Tips for taking medicine  Remembering to take medicine each day can be hard for anyone. It can be even harder to remember when you dont have symptoms. Try these tips:    Create a routine. For example, take long-term controllers as part of getting ready for bed, before you brush your teeth in the morning, or both.    Make sure you understand what long-term controllers do and dont do.    Refill your prescriptions on time, or even ahead of time, so you dont run out.    Carry your quick-relief medicine with you. If you can, keep a spare quick-relief inhaler at work, at school, or in your gym bag.    When you travel, make sure you have enough medicine to last for your entire trip.    When traveling by air, keep your medicines with you, not packed in your luggage.    Make sure you know how to  tell if your inhaler is empty. Ask your provider or pharmacist. Or check the instructions that come with your inhaler.  Working with your healthcare provider  By working with your healthcare provider, you can get the most benefit from your medicine. Talk with your provider about:    Getting the right dose. Over time, your healthcare provider may raise or lower the dose of your controller medicine. The goal is to find the amount of medicine to keep asthma in control, without taking more than is needed.    Finding the right medicines for you. Each person is unique. It may take a few tries to find the right medicine or combination of medicines for you. If one medicine doesnt work well for you, another may work better.    Reducing side effects. If you have side effects, dont just stop taking your medicine. Instead, call your healthcare provider. A new medicine or change in the amount of medicine may solve the problem.  Date Last Reviewed: 10/1/2016    8577-3170 [employer]. [address] [place] [address]. All rights reserved. This information is not intended as a substitute for professional medical care. Always follow your healthcare professional's instructions.           Patient Education     Coronavirus Disease 2019 (COVID-19): Caring for Yourself or Others   If you or a household member have symptoms of COVID-19, follow the guidelines below. This will help you manage symptoms and keep the virus from spreading.  If you have symptoms of COVID-19    Stay home and contact your care team. They will tell you what to do.    Dont panic. Keep in mind that other illnesses can cause similar symptoms.    Stay away from work, school, and public places.    Limit physical contact with others in your home. Limit visitors. No kissing.  Clean surfaces you touch with disinfectant.  If you need to cough or sneeze, do it into a tissue. Then throw the tissue into the trash. If you don't have tissues, cough or  sneeze into the bend of your elbow.  Dont share food or personal items with people in your household. This includes items like eating and drinking utensils, towels, and bedding.  Wear a cloth face mask around other people. During a public health emergency, medical face masks may be reserved for healthcare workers. You may need to make a cloth face mask of your own. You can do this using a bandana, T-shirt, or other cloth. The [facility] has instructions on how to make a face mask. Wear the mask so that it covers both your nose and mouth.  If you need to go to a hospital or clinic, call ahead to let them know. Expect the care team to wear masks, gowns, gloves, and eye protection. You may be put in a separate room.  Follow all instructions from your care team.    If youve been diagnosed with COVID-19    Stay home and away from others, including other people in your home. (This is called self-isolation.) Dont leave home unless you need to get medical care. Dont go to work, school, or public places. Dont use buses, taxis, or other public transportation.    Follow all instructions from your care team.    If you need to go to a hospital or clinic, call ahead to let them know. Expect the care team to wear masks, gowns, gloves, and eye protection. You may be put in a separate room.    Wear a face mask over your nose and mouth. This is to protect others from your germs. If you cant wear a mask, your caregivers should wear one. You may need to make your own mask using a bandana, T-shirt, or other cloth. See the CDCs instructions on how to make a face mask.    Have no contact with pets and other animals.    Dont share food or personal items with people in your household. This includes items like eating and drinking utensils, towels, and bedding.    If you need to cough or sneeze, do it into a tissue. Then throw the tissue into the trash. If you don't have tissues, cough or sneeze into the bend of your elbow.    Wash your hands  often.    Self-care at home   At this time, there is no medicine approved to prevent or treat COVID-19. Experts are testing different medicines, trying to find one that works.  So far, the most proven treatment is to support your body while it fights the virus.    Getting extra rest.    Drink extra fluids 6 to 8 glasses of liquids each day), unless a doctor has told you not to. Ask your care team which fluids are best for you. Avoid fluids that contain caffeine or alcohol.    Taking over-the-counter (OTC) medicine to reduce pain and fever. Your care team will tell you which medicine to use.  If youve been in the hospital for COVID-19, follow your care teams instructions. They will tell you when to stop self-isolation. They may also have you change positions to help your breathing (such as lying on your belly).  If a test showed that you have COVID-19, you may be asked to donate plasma after youve recovered. (This is called COVID-19 convalescent plasma donation.) The plasma may contain antibodies to help fight the virus in others. Scientists are testing whether this might be a treatment in the future. For more information, talk to your care team.  Caring for a sick person     Follow all instructions from the care team.    Wash your hands often.    Wear protective clothing as advised.    Make sure the sick person wears a mask. If they can't wear a mask, dont stay in the same room with them. If you must be in the same room, wear a face mask. Make sure the mask covers both the nose and mouth.    Keep track of the sick persons symptoms.    Clean surfaces often with disinfectant. This includes phones, kitchen counters, fridge door handles, bathroom surfaces, and others.    Dont let anyone share household items with the sick person. This includes eating and drinking tools, towels, sheets, or blankets.    Clean fabrics and laundry well.    Keep other people and pets away from the sick person.    When you can stop  self-isolation  When you are sick with COVID-19, you should stay away from other people. This is called self-isolation. The rules for ending self-isolation depend on your health in general.  If you are normally healthy  You can stop self-isolation when all 3 of these are true:    Youve had no fever--and no medicine that reduces fever--for at least 24 hours. And?    Your symptoms are better, such as cough or trouble breathing. And?    At least 10 days have passed since your symptoms first started.  Talk with your care team before you leave home. They may tell you its okay to leave, or they may give you different advice.  If you have a weak immune system  If youre being treated for cancer, have an immune disorder (such as HIV), or have had a transplant &$40;organ or bone marrow), follow your care teams instructions. You may be asked to stay home from 10 days to 20 days after your symptoms first started. Your care team may want to re-test you for COVID-19.  When you return to public settings  When you are well enough to go outside your home, follow the CDCs guidance on cloth face masks.    Anyone over age 2 should wear a face mask in public, especially when it's hard to socially distance. This includes public transit, public protests and marches, and crowded stores, bars, and restaurants.    Face masks are most likely to reduce the spread of COVID-19 when they are widely used by people who are out in the public.  Certain people should not wear a face covering. These include:    Children under 2 years old    Anyone with a health, developmental, or mental health condition that can be made worse by wearing a mask    Anyone who is unconscious or unable to remove the face covering without help. See the CDC's guidance on who should not wear a face mask.  When to call your care team  Call your care team right away if a sick person has any of these:    Trouble breathing    Pain or pressure in chest  If a sick person has any of  these, call 911:    Trouble breathing that gets worse    Pain or pressure in chest that gets worse    Blue tint to lips or face    Fast or irregular heartbeat    Confusion or trouble waking    Fainting or loss of consciousness    Coughing up blood  Going home from the hospital   If you have COVID-19 and were recently in the hospital:    Follow the instructions above for self-care and isolation.    Follow the hospital care teams instructions.    Ask questions if anything is unclear to you. Write down answers so you remember them.  Date last modified: 10/13/2020  Alan last reviewed this educational content on 4/1/2020  This information has been modified by your health care provider with permission from the publisher.    3727-6463 [employer]. [address] [place] [address], [place] [address]. All rights reserved. This information is not intended as a substitute for professional medical care. Always follow your healthcare professional's instructions.

## 2021-07-06 ENCOUNTER — TELEPHONE (OUTPATIENT)
Dept: FAMILY MEDICINE | Facility: CLINIC | Age: 26
End: 2021-07-06

## 2021-07-06 NOTE — TELEPHONE ENCOUNTER
[facility] Call Center    Phone Message    May a detailed message be left on voicemail: yes     Reason for Call: Medication Refill Request    Has the patient contacted the pharmacy for the refill? Yes   Name of medication being requested: amphetamine-dextroamphetamine (ADDERALL XR) 10 MG 24 hr capsule  Provider who prescribed the medication: Leta  Pharmacy: [place], [place] - [address] [place] [address] [phone]  Date medication is needed: asap      Patient would like before Friday 7/9/21 as she is going out of town. Thank you!       Action Taken: Message routed to:  [facility] ([facility]): np    Travel Screening: Not Applicable

## 2021-07-07 DIAGNOSIS — F90.1 ATTENTION DEFICIT HYPERACTIVITY DISORDER (ADHD), PREDOMINANTLY HYPERACTIVE TYPE: ICD-10-CM

## 2021-07-07 DIAGNOSIS — F90.0 ATTENTION DEFICIT HYPERACTIVITY DISORDER (ADHD), PREDOMINANTLY INATTENTIVE TYPE: Primary | ICD-10-CM

## 2021-07-07 RX ORDER — DEXTROAMPHETAMINE SACCHARATE, AMPHETAMINE ASPARTATE MONOHYDRATE, DEXTROAMPHETAMINE SULFATE AND AMPHETAMINE SULFATE 2.5; 2.5; 2.5; 2.5 MG/1; MG/1; MG/1; MG/1
10 CAPSULE, EXTENDED RELEASE ORAL DAILY
Qty: 30 CAPSULE | Refills: 0 | Status: SHIPPED | OUTPATIENT
Start: 2021-07-07 | End: 2021-08-05

## 2021-07-19 ENCOUNTER — ALLIED HEALTH/NURSE VISIT (OUTPATIENT)
Dept: DERMATOLOGY | Facility: CLINIC | Age: 26
End: 2021-07-19

## 2021-07-19 DIAGNOSIS — L70.0 ACNE VULGARIS: Primary | ICD-10-CM

## 2021-07-19 PROCEDURE — 96999 UNLISTED SPEC DERM SVC/PX: CPT | Performed by: DERMATOLOGY

## 2021-07-19 NOTE — PATIENT INSTRUCTIONS
Skin Peel Post Care Instructions    I will experience redness, swelling, peeling, pain, and heat sensation which can last 5-10days and may persist for 2-3weeks. I may experience itching, or acne. Risks are permanent scarring, temporary or permanent skin lightening or darkening, infection, and eye injury. I understand my outcome could be no improvement or slight improvement. Multiple treatments may be required.     What can I expect?    Skin peeling for three to five days    Flaking    Skin darkening    Redness    How do I take care of my skin?    Patient compliance is mandatory for an optimal outcome and to avoid complications    Wear sunscreen (SPF 30 or greater) and a hat. Stay out of the sun for three to four weeks     Use a gentle skin care regimen with a sunscreen with at least an SPF of 30 or more. Examples include the following:   o SkinCeuticals Gentle Cleanser and SkinCeuticals Physical Matte UV DEFENSE SPF 50   o Cetaphil Soap followed by Cetaphil Sunscreen in the am and pm    Put on a bland moisturizer (Aquaphor or Vaseline) if sunscreen stings    Do not pick at peel or peel the skin. This will cause scarring.    Wait to use medicated creams until the skin has healed. This occurs five to seven days later    You can use make-up after 24 hours. Make sure make-up does NOT contain salicylic acid.     Eye make-up and lipstick are okay immediately after procedure    Do not use facial scrubs, depilatories, steam, any exfoliation procedures, etc. on your face (or treated area of skin) for one week post-peel    When should I call the doctor?    Cold sores    Swelling    Crusting    Who should I call with questions?    [facility]: [phone]     [facility]: [phone]    For urgent needs outside of business hours call the [facility] at [phone] and ask for the dermatology resident on call

## 2021-07-19 NOTE — NURSING NOTE
Juanita Gonzalez comes into clinic today at the request of Dr. Lopez Ordering Provider for chemical peel.    This service provided today was under the supervising provider of the day Dr. Vergara, who was available if needed.    Hannah Chase RN    Nursing Peel Treatment Record:  Jul 19, 2021    Pre peel:    Any changes in health or medications: No    Strawberry or aspirin allergy (If yes, then no salicylic acid peels to be given and procedure to be held):  No    Sulfa allergy (If yes, then [employer]uticals Sensitive Skin peel procedure to be held):  Yes ,reviewed with provider. Ok to proceed with peel. No reactions noted on last peel. 6/14/2021.    Is the patient taking Valtrex:  No.  If so, date started:  N/A    Pregnant or breastfeeding (If yes, peel procedure to be held): No    Baseline photo obtained (3 views): Yes    Areas treated today: face    Treatment #: 3.    Was retinoid stopped 7 days prior to peel: Yes    Peel Type: Micropeel plus(20% salicylic acid with 3% glycolic acid)    Has patient had electrolysis, depilatory creams, waxing or laser hair removal in the last week to treatment site: No.  If yes, then hold peel.    Peel record:    Eye shields were placed.    Area to be treated was cleansed with Simply Clean Cleanser using rough 4X4 gauze pads.    Dermaplaning was performed: no.    Area was then toned with with the Conditioning Solution using rough 4X4 gauze pads.    Then, the areas were degreased with acetone. Time-out was performed to evaluate for any sensitive skin.      Hydrabalm was then applied to the lips, perinasal region and near the outer canthi.     The peel was applied with salamanca swabs for 3 passes on face with the exception of upper lip, then removed with water dampened 4X4 soft gauze.    CE Ferulic, Phytocorrective gel and epidermal repair topicals were applied and followed by Sunscreen (Sheer Physical UV defence SPF 50).       Post peel:    Patient reminded of need to wait to use  medicated creams until the skin has healed. This occurs five to seven days later. Post peel care instructions provided including need for sun avoidance. Patient tolerated peel well, no complications noted.     Payment:  The patient $125.00  for this peel.     Patient is scheduled with Dr. Lopez for follow up on 9/2/2021 @ 3:30pm phone visit.

## 2021-07-20 ENCOUNTER — VIRTUAL VISIT (OUTPATIENT)
Dept: INTERNAL MEDICINE | Facility: CLINIC | Age: 26
End: 2021-07-20
Payer: COMMERCIAL

## 2021-07-20 DIAGNOSIS — G44.219 EPISODIC TENSION-TYPE HEADACHE, NOT INTRACTABLE: ICD-10-CM

## 2021-07-20 DIAGNOSIS — R19.5 LOOSE STOOLS: ICD-10-CM

## 2021-07-20 DIAGNOSIS — J45.20 MILD INTERMITTENT ASTHMA WITHOUT COMPLICATION: ICD-10-CM

## 2021-07-20 DIAGNOSIS — K92.1 BLACK STOOL: Primary | ICD-10-CM

## 2021-07-20 PROCEDURE — 99443 PR PHYSICIAN TELEPHONE EVALUATION 21-30 MIN: CPT | Mod: 95 | Performed by: NURSE PRACTITIONER

## 2021-07-20 RX ORDER — MONTELUKAST SODIUM 10 MG/1
10 TABLET ORAL AT BEDTIME
Qty: 90 TABLET | Refills: 3 | Status: SHIPPED | OUTPATIENT
Start: 2021-07-20 | End: 2021-12-10

## 2021-07-20 RX ORDER — ALBUTEROL SULFATE 90 UG/1
2 AEROSOL, METERED RESPIRATORY (INHALATION) EVERY 6 HOURS PRN
Qty: 8.5 G | Refills: 2 | Status: SHIPPED | OUTPATIENT
Start: 2021-07-20 | End: 2022-04-19

## 2021-07-20 NOTE — PROGRESS NOTES
Juanita is a 25 year old who is being evaluated via a billable telephone visit.      What phone number would you like to be contacted at? [phone]   How would you like to obtain your AVS? MyChart    Assessment & Plan     Loose stools  Black stool  Loose stools and bloating, I suspect related to lactose sensitivity or intolerance, advised cutting out all dairy (avoid cheese, yogurt, ice cream, etc.) for 2 weeks to see if improvement in symptoms. Given report of intermittent black stools will obtain FIT test, though I have low suspicion for bleeding given previously normal hemoglobin. Continue with good fruit/vegetable intake and healthy diet.   - Fecal cancer screen FIT; Future    Mild intermittent asthma without complication  Reports episodes of needing inhaler vs wondering if anxiety related to reminding herself to take a deep breath. Restart Singulair and refill for albuterol provided. Encouraged relaxation exercises and deep breathing, continue working with therapist for anxiety management.  - montelukast (SINGULAIR) 10 MG tablet; Take 1 tablet (10 mg) by mouth At Bedtime  - albuterol (PROAIR HFA/PROVENTIL HFA/VENTOLIN HFA) 108 (90 Base) MCG/ACT inhaler; Inhale 2 puffs into the lungs every 6 hours as needed for shortness of breath / dyspnea or wheezing    Episodic tension-type headache, not intractable  L-sided headache, most recently lasting 2-3 days and unrelieved by Tylenol. Encouraged staying well-hydrated, good sleep hygiene, and follow-up for routine eye exam, notes eye strain/difficulty focusing. Can use Ibuprofen 600 mg for headache if pain returns. Also advised that she schedule PT for back/neck pain as we discussed in her previous visit, may be contributing to tension headaches. Will refer to Neurology to address further if no improvement with above measures.   - Adult Neurology Referral      43 minutes spent on the date of the encounter doing chart review, history and exam, documentation and further  "activities per the note       BMI:   Estimated body mass index is 40.39 kg/m  as calculated from the following:    Height as of 10/30/20: 1.626 m (5' 4\").    Weight as of 6/23/21: 106.7 kg (235 lb 4.8 oz).           No follow-ups on file.    PINKY Berger CNP  [facility] JENNIFER Grant is a 25 year old who presents for the following health issues     HPI     Back pain--needs to schedule with Chiropractor and PT, was visiting with her mother in [place]. Hasn't taken anything to treat, usually feels it doesn't help and doesn't like to take medication.     Loose stools--feels like digestion is \"messed up,\" doesn't drink milk, thinks lactose intolerant, gets stomach \"gurgling\" and bloating and then loose stools. Does eats yogurt, cheese, ice cream a lot in the summer. When overdoing coffee/caffeine will have diarrhea. Not watery. Stool changes color, sometimes looks \"cloudy\". Tries eating slower. Saw blood in the stool in 2019. Occasionally has black stool now, no known pattern. Went on vacation in May, drank more alcohol at that time, went 1.5 days without bowel movement, used an OTC milk of magnesia, improved constipation. Eats a lot of fruits and veggies, usually 3-5 servings. Cherries, maria de jesus, banana for breakfast, then vegetable with dinner.     Headaches--last week at work, had a headache that lasted 2-3 days. Wasn't smoking or drinking prior. L side, middle of the head. Tried 2 tablets Tylenol, didn't help, and 2 more after without significant improvement. Had had good sleep prior, eating well. Went to her eye clinic but not due for eye exam/new prescription. Feels like her depth perception is off, has to squint more to focus. Needing to  new glasses. Had restarted Adderall last month, had had headaches off the Adderall too, but hadn't seen association with restarting. No N/V or other associated symptoms with headaches.    Breathing--has episodes " "where she forgets to breathe, struggles with anxiety, feels overwhelmed and has to catch her breath. Other times feels like something sitting in the chest. Sitting doing nothing, and feels like she needs an inhaler or needs to stop to take a deep breath, sometimes feels like she can't. Inhaler usually helps. Usually notices when sitting or at rest. Hx asthma since she was a kid, used to bother more when running or heavy activity, felt like lungs are \"tight\" but now feels like someone took her breath away. Happens a couple times per week. Still working with a therapist, trying to get anxiety in check, comes in different ways. Has been okay, but sometimes over-thinks things. Tries to take a deep breath, usually does help.     Review of Systems   Constitutional, HEENT, cardiovascular, pulmonary, gi and gu systems are negative, except as otherwise noted.      Objective           Vitals:  No vitals were obtained today due to virtual visit.    Physical Exam   healthy, alert and no distress  PSYCH: Alert and oriented times 3; coherent speech, normal   rate and volume, able to articulate logical thoughts, able   to abstract reason, no tangential thoughts, no hallucinations   or delusions  Her affect is normal and pleasant  RESP: No cough, no audible wheezing, able to talk in full sentences  Remainder of exam unable to be completed due to telephone visits                Phone call duration: 31 minutes  "

## 2021-07-27 ENCOUNTER — LAB (OUTPATIENT)
Dept: LAB | Facility: CLINIC | Age: 26
End: 2021-07-27
Payer: COMMERCIAL

## 2021-07-27 ENCOUNTER — THERAPY VISIT (OUTPATIENT)
Dept: PHYSICAL THERAPY | Facility: CLINIC | Age: 26
End: 2021-07-27
Attending: NURSE PRACTITIONER
Payer: COMMERCIAL

## 2021-07-27 DIAGNOSIS — M54.2 CERVICALGIA: ICD-10-CM

## 2021-07-27 DIAGNOSIS — M54.6 CHRONIC MIDLINE THORACIC BACK PAIN: ICD-10-CM

## 2021-07-27 DIAGNOSIS — G89.29 CHRONIC MIDLINE THORACIC BACK PAIN: ICD-10-CM

## 2021-07-27 DIAGNOSIS — K92.1 BLACK STOOL: ICD-10-CM

## 2021-07-27 PROCEDURE — 97110 THERAPEUTIC EXERCISES: CPT | Mod: GP | Performed by: PHYSICAL THERAPIST

## 2021-07-27 PROCEDURE — 97161 PT EVAL LOW COMPLEX 20 MIN: CPT | Mod: GP | Performed by: PHYSICAL THERAPIST
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
## 2021-08-03 ENCOUNTER — OFFICE VISIT (OUTPATIENT)
Dept: OBGYN | Facility: CLINIC | Age: 26
End: 2021-08-03
Attending: NURSE PRACTITIONER
Payer: COMMERCIAL

## 2021-08-03 VITALS
HEART RATE: 72 BPM | DIASTOLIC BLOOD PRESSURE: 73 MMHG | SYSTOLIC BLOOD PRESSURE: 107 MMHG | WEIGHT: 232.1 LBS | BODY MASS INDEX: 39.63 KG/M2 | HEIGHT: 64 IN

## 2021-08-03 DIAGNOSIS — Z30.431 IUD CHECK UP: Primary | ICD-10-CM

## 2021-08-03 DIAGNOSIS — Z11.3 SCREEN FOR STD (SEXUALLY TRANSMITTED DISEASE): ICD-10-CM

## 2021-08-03 PROCEDURE — 99213 OFFICE O/P EST LOW 20 MIN: CPT | Performed by: NURSE PRACTITIONER

## 2021-08-03 PROCEDURE — 87591 N.GONORRHOEAE DNA AMP PROB: CPT | Performed by: NURSE PRACTITIONER

## 2021-08-03 PROCEDURE — 87491 CHLMYD TRACH DNA AMP PROBE: CPT | Performed by: NURSE PRACTITIONER

## 2021-08-03 PROCEDURE — G0463 HOSPITAL OUTPT CLINIC VISIT: HCPCS

## 2021-08-03 ASSESSMENT — ANXIETY QUESTIONNAIRES
2. NOT BEING ABLE TO STOP OR CONTROL WORRYING: NOT AT ALL
6. BECOMING EASILY ANNOYED OR IRRITABLE: NOT AT ALL
7. FEELING AFRAID AS IF SOMETHING AWFUL MIGHT HAPPEN: NOT AT ALL
GAD7 TOTAL SCORE: 1
1. FEELING NERVOUS, ANXIOUS, OR ON EDGE: NOT AT ALL
3. WORRYING TOO MUCH ABOUT DIFFERENT THINGS: SEVERAL DAYS
5. BEING SO RESTLESS THAT IT IS HARD TO SIT STILL: NOT AT ALL

## 2021-08-03 ASSESSMENT — PATIENT HEALTH QUESTIONNAIRE - PHQ9: 5. POOR APPETITE OR OVEREATING: NOT AT ALL

## 2021-08-03 ASSESSMENT — MIFFLIN-ST. JEOR: SCORE: 1792.8

## 2021-08-03 NOTE — LETTER
8/3/2021       RE: Juanita oGnzalez  [id number]  [facility] [address]     Dear Colleague,    Thank you for referring your patient, Juanita Gonzalez, to the [facility] [place] at [facility]. Please see a copy of my visit note below.    Subjective:  Juanita Gonzalez is a 25 yr old female, , who presents to clinic today for a routine IUD string check.  Mirena IUD was placed 2019. She has no specific concerns. Has never tried to feel her IUD strings.  Has infrequent, sporadic light spotting. Experiences mild cramping for a couple days each month accompanied by other PMS/ menstrual like symptoms. Tolerable, not bothersome to patient, and much improved from dysmenorrhea experienced before IUD was placed. Has occasional deep pelvic pain with intercourse; resolves with position change.  Sexually active with male partner, monogamous relationship; desires STD testing today.     Past Medical History:   Diagnosis Date     Allergic conjunctivitis      Anisometropia      Asthma      Attention deficit disorder with hyperactivity(314.01)     stopped medication 1 year ago     Myopia      Nonorganic enuresis     voiding dysfunction      Obesity, unspecified      Wears contact lenses      Past Surgical History:   Procedure Laterality Date     DENTAL SURGERY       GRAFT BONE TO FINGER  3/18/2013    Procedure: GRAFT BONE TO FINGER;  Right Second Metacarpal Curettage and Allograft;  Surgeon: Jeremiah Yu MD;  Location: [facility]     HC TOOTH EXTRACTION W/FORCEP       LE FORT ONE N/A 2015    Procedure: LE FORT ONE;  Surgeon: Vitaliy Yun DDS;  Location: [facility] OR     LE FORT ONE , OSTEOTOMY SAGITTAL SPLIT, COMBINED N/A 12/3/2014    Procedure: COMBINED LE FORT ONE, OSTEOTOMY SAGITTAL SPLIT;  Surgeon: Vitaliy Yun DDS;  Location: [facility] OR     TONGUE SURGERY  2014    tongue lesion removed     Family History   Problem  "Relation Age of Onset     Asthma Mother      Obesity Father      Hypertension Maternal Grandmother      Cancer Maternal Grandfather      Hypertension Paternal Grandmother      Glaucoma Other         Mat Great Aunt/blindness     Glaucoma Maternal Aunt      Macular Degeneration No family hx of      Melanoma No family hx of      Skin Cancer No family hx of        Objective:  /73 (BP Location: Right arm, Patient Position: Chair)   Pulse 72   Ht 1.626 m (5' 4\")   Wt 105.3 kg (232 lb 1.6 oz)   BMI 39.84 kg/m    General: pleasant female in no acute distress  Psych: normal mentation  Respiratory: unlabored breathing  Pelvic Exam:  Vulva: No external lesions, normal hair distribution, no adenopathy  Vagina: Moist, pink, no abnormal discharge, well rugated, no lesions  Cervix: nulliparous, smooth, pink, no visible lesions; no CMT; IUD strings extend 3cm from the cervical os    Asssessment:  IUD string check  STD testing    Plan:  Normal IUD string check. Return to clinic 11/2022 for next pap smear or sooner, PRN, for IUD string check or other concerns.   STD testing done today; pt will be notified of results by [employer].  Pt expressed understanding and agreement with the plan for care.      Michaela Flores, DNP, APRN, WHNP      "

## 2021-08-03 NOTE — PROGRESS NOTES
Subjective:  Juanita Gonzalez is a 25 yr old female, , who presents to clinic today for a routine IUD string check.  Mirena IUD was placed 2019. She has no specific concerns. Has never tried to feel her IUD strings.  Has infrequent, sporadic light spotting. Experiences mild cramping for a couple days each month accompanied by other PMS/ menstrual like symptoms. Tolerable, not bothersome to patient, and much improved from dysmenorrhea experienced before IUD was placed. Has occasional deep pelvic pain with intercourse; resolves with position change.  Sexually active with male partner, monogamous relationship; desires STD testing today.     Past Medical History:   Diagnosis Date     Allergic conjunctivitis      Anisometropia      Asthma      Attention deficit disorder with hyperactivity(314.01)     stopped medication 1 year ago     Myopia      Nonorganic enuresis     voiding dysfunction      Obesity, unspecified      Wears contact lenses      Past Surgical History:   Procedure Laterality Date     DENTAL SURGERY       GRAFT BONE TO FINGER  3/18/2013    Procedure: GRAFT BONE TO FINGER;  Right Second Metacarpal Curettage and Allograft;  Surgeon: Jeremiah Yu MD;  [facility]     HC TOOTH EXTRACTION W/FORCEP       LE FORT ONE N/A 2015    Procedure: LE FORT ONE;  Surgeon: Vitaliy Yun DDS;  [facility]     LE FORT ONE , OSTEOTOMY SAGITTAL SPLIT, COMBINED N/A 12/3/2014    Procedure: COMBINED LE FORT ONE, OSTEOTOMY SAGITTAL SPLIT;  Surgeon: Vitaliy Yun DDS;  Location: [facility]     TONGUE SURGERY  2014    tongue lesion removed     Family History   Problem Relation Age of Onset     Asthma Mother      Obesity Father      Hypertension Maternal Grandmother      Cancer Maternal Grandfather      Hypertension Paternal Grandmother      Glaucoma Other         Mat Great Aunt/blindness     Glaucoma Maternal Aunt      Macular Degeneration No family hx of      Melanoma No family hx of      Skin  "Cancer No family hx of        Objective:  /73 (BP Location: Right arm, Patient Position: Chair)   Pulse 72   Ht 1.626 m (5' 4\")   Wt 105.3 kg (232 lb 1.6 oz)   BMI 39.84 kg/m    General: pleasant female in no acute distress  Psych: normal mentation  Respiratory: unlabored breathing  Pelvic Exam:  Vulva: No external lesions, normal hair distribution, no adenopathy  Vagina: Moist, pink, no abnormal discharge, well rugated, no lesions  Cervix: nulliparous, smooth, pink, no visible lesions; no CMT; IUD strings extend 3cm from the cervical os    Asssessment:  IUD string check  STD testing    Plan:  Normal IUD string check. Return to clinic 11/2022 for next pap smear or sooner, PRN, for IUD string check or other concerns.   STD testing done today; pt will be notified of results by [email or web address].  Pt expressed understanding and agreement with the plan for care.      Michaela Flores, DNP, APRN, WHNP           "

## 2021-08-04 LAB
C TRACH DNA SPEC QL NAA+PROBE: NEGATIVE
N GONORRHOEA DNA SPEC QL NAA+PROBE: NEGATIVE

## 2021-08-04 ASSESSMENT — ANXIETY QUESTIONNAIRES: GAD7 TOTAL SCORE: 1

## 2021-08-05 DIAGNOSIS — F90.0 ATTENTION DEFICIT HYPERACTIVITY DISORDER (ADHD), PREDOMINANTLY INATTENTIVE TYPE: ICD-10-CM

## 2021-08-05 NOTE — TELEPHONE ENCOUNTER
[facility]    Phone Message    May a detailed message be left on voicemail: yes     Reason for Call: Medication Refill Request    Has the patient contacted the pharmacy for the refill? Yes   Name of medication being requested: amphetamine-dextroamphetamine (ADDERALL XR) 10 MG 24 hr capsule     Provider who prescribed the medication: Adwoa Calabrese  Pharmacy: [facility]  Date medication is needed: 8/9/21         Action Taken: Message routed to:  [facility] med refills

## 2021-08-06 RX ORDER — DEXTROAMPHETAMINE SACCHARATE, AMPHETAMINE ASPARTATE MONOHYDRATE, DEXTROAMPHETAMINE SULFATE AND AMPHETAMINE SULFATE 2.5; 2.5; 2.5; 2.5 MG/1; MG/1; MG/1; MG/1
10 CAPSULE, EXTENDED RELEASE ORAL DAILY
Qty: 30 CAPSULE | Refills: 0 | Status: SHIPPED | OUTPATIENT
Start: 2021-08-06 | End: 2021-09-17

## 2021-08-12 ENCOUNTER — THERAPY VISIT (OUTPATIENT)
Dept: PHYSICAL THERAPY | Facility: CLINIC | Age: 26
End: 2021-08-12
Payer: COMMERCIAL

## 2021-08-12 ENCOUNTER — APPOINTMENT (OUTPATIENT)
Dept: LAB | Facility: CLINIC | Age: 26
End: 2021-08-12
Payer: COMMERCIAL

## 2021-08-12 DIAGNOSIS — M54.2 CERVICALGIA: ICD-10-CM

## 2021-08-12 DIAGNOSIS — G89.29 CHRONIC MIDLINE THORACIC BACK PAIN: ICD-10-CM

## 2021-08-12 DIAGNOSIS — M54.6 CHRONIC MIDLINE THORACIC BACK PAIN: ICD-10-CM

## 2021-08-12 LAB — HEMOCCULT STL QL IA: NEGATIVE

## 2021-08-12 PROCEDURE — 82274 ASSAY TEST FOR BLOOD FECAL: CPT | Performed by: NURSE PRACTITIONER

## 2021-08-12 PROCEDURE — 97110 THERAPEUTIC EXERCISES: CPT | Mod: GP | Performed by: PHYSICAL THERAPIST

## 2021-08-12 PROCEDURE — 97140 MANUAL THERAPY 1/> REGIONS: CPT | Mod: GP | Performed by: PHYSICAL THERAPIST

## 2021-08-12 PROCEDURE — 97112 NEUROMUSCULAR REEDUCATION: CPT | Mod: GP | Performed by: PHYSICAL THERAPIST

## 2021-08-19 ENCOUNTER — THERAPY VISIT (OUTPATIENT)
Dept: PHYSICAL THERAPY | Facility: CLINIC | Age: 26
End: 2021-08-19
Payer: COMMERCIAL

## 2021-08-19 DIAGNOSIS — M54.6 CHRONIC MIDLINE THORACIC BACK PAIN: ICD-10-CM

## 2021-08-19 DIAGNOSIS — G89.29 CHRONIC MIDLINE THORACIC BACK PAIN: ICD-10-CM

## 2021-08-19 DIAGNOSIS — M54.2 CERVICALGIA: ICD-10-CM

## 2021-08-19 PROCEDURE — 97530 THERAPEUTIC ACTIVITIES: CPT | Mod: GP | Performed by: PHYSICAL THERAPIST

## 2021-08-19 PROCEDURE — 97110 THERAPEUTIC EXERCISES: CPT | Mod: GP | Performed by: PHYSICAL THERAPIST

## 2021-08-19 PROCEDURE — 97140 MANUAL THERAPY 1/> REGIONS: CPT | Mod: GP | Performed by: PHYSICAL THERAPIST

## 2021-08-25 ENCOUNTER — TELEPHONE (OUTPATIENT)
Dept: INTERNAL MEDICINE | Facility: CLINIC | Age: 26
End: 2021-08-25

## 2021-08-25 DIAGNOSIS — Z20.822 ENCOUNTER FOR LABORATORY TESTING FOR COVID-19 VIRUS: Primary | ICD-10-CM

## 2021-08-25 NOTE — TELEPHONE ENCOUNTER
[facility]    Phone Message    May a detailed message be left on voicemail: yes     Reason for Call: Other: Pt requesting call back from a nurse. Pt stated she has a couple general questions regarding her health     Action Taken: Message routed to:  [facility] selene

## 2021-08-26 NOTE — CONFIDENTIAL NOTE
Spoke to Juanita via telephone regarding recent call to the call center. She had a recent exposure to COVID and was wondering if she could get an order for covid test. Denies any covid symptoms - but her boyfriend just tested positive and she was with him 2 days ago. She works in healthcare, and thought it was important that she be tested.  COVID swab order placed, gave her the lab number to schedule.  Bernadette Sanchez RN     0

## 2021-09-02 ENCOUNTER — VIRTUAL VISIT (OUTPATIENT)
Dept: DERMATOLOGY | Facility: CLINIC | Age: 26
End: 2021-09-02
Payer: COMMERCIAL

## 2021-09-02 ENCOUNTER — MYC MEDICAL ADVICE (OUTPATIENT)
Dept: DERMATOLOGY | Facility: CLINIC | Age: 26
End: 2021-09-02

## 2021-09-02 DIAGNOSIS — L70.0 ACNE VULGARIS: Primary | ICD-10-CM

## 2021-09-02 PROCEDURE — 99214 OFFICE O/P EST MOD 30 MIN: CPT | Mod: 95 | Performed by: DERMATOLOGY

## 2021-09-02 NOTE — LETTER
9/2/2021         RE: Juanita Gonzalez  [id number]  [facility] [address]        Dear Colleague,    Thank you for referring your patient, Juanita Gonzalez, to the [facility]. Please see a copy of my visit note below.    [facility] Dermatology Note  Encounter Date: Sep 2, 2021  Location of teledermatologist: [facility]. Start time: 3:30pm. End time: 3:45pm.    Dermatology Problem List:  1. Acne vulgaris  - Spironolactone (50 mg bid initiated 3/15/21, dropped to 25mg daily due to dizziness 6/2021, trying to steadily increase now)   - Morning topicals: BPO wash, azelaic acid, clindamycin 1% lotion daily as spot treatment   - Bedtime topicals: gentle cleanser, Tretinoin 0.05% cream at bedtime (increased to this strength on 9/2/21)  - S/p chemical peels x3  2 Post inflammatory hyperpigmentation  - did not tolerate hydroquinone in the past  - s/p skinceuticals micropeel plus x3    Social history: Currently in  school.  ____________________________________________    ASSESSMENT/PLAN:    # Acne vulgaris. Chronic, not yet at goal. Still with new cystic acne appearing on the chin. Patient notes the side effect of dizziness when she would get up when on spironolactone 50mg BID. She decreased these to 25mg daily and the dizziness stopped. I would recommend gradually increasing this medication. For the next two weeks, plan on 1/2 of pill (25mg) in the morning and in the evening. If tolerated, increase to 1/2 of pill (25mg) in the morning and an entire pill (50mg) in the evening. If tolerated after two weeks, can increase to 50mg in the morning and 50mg in the evening. Patient aware she should not get pregnant while on this medication. Notes no side effects of breast tenderness, headaches, muscle cramps, spotting, tetragenic effects.   - AM: Start BPO wash. Recommended Neutrogena Clear Pore Cleanser or CeraVe Acne Foaming  "Cream Cleanser.   - AM: Start azelaic acid in the am. Sent rx. Discussed \"Debbie's choice\" on amazon, if this is not covered by insurance.  - AM: Continue clindamycin 1% lotion as spot treatment for any acne.  - PM: Continue cetaphil gentle facial cleanser.  - PM: Increase to tretinoin 0.05% cream.  - Hold chemical peels at this time. Can consider this in the future.    Procedures Performed:   None.    Follow-up: 3 month in-person, or earlier for new or changing lesions    Staff and Scribe:     Scribe Disclosure:   I, Yesi Wong, am serving as a scribe to document services personally performed by this physician, Dr. Danica Lopez, based on data collection and the provider's statements to me.     Provider Disclosure:   The documentation recorded by the scribe accurately reflects the services I personally performed and the decisions made by me.    Danica Lopez MD    Department of Dermatology  [facility]: Phone: [phone], Fax:[phone]  [facility]: Phone: [phone], Fax: [phone]    ____________________________________________    CC: Derm Problem (acne seems to be better. Hyperpigmentation has improved. Pt is on spiriactalone and has done facial peels. Pt states \"They seemed to work, but I want to see if there's anything that would help more?\")    HPI:  Ms. Juanita Gonzalez is a(n) 25 year old female who presents today as a return patient for chemical peel follow up.    Patient was last seen on 7/19/21 for a chemical peel.    Today patient notes acne seems to be doing better. The hyperpigmentation has improved. Patient currently on spironolactone and has done chemical peels. These seemed to work, but she is wondering if there is anything else that would help more. Got dizziness with full dose of spironolactone so has been taking only 25mg daily. This does not seem " to be as effective.    Patient is otherwise feeling well, without additional concerns.    Labs:  NA    Physical exam:  Vitals: There were no vitals taken for this visit.  SKIN: Telemedicine photographs reviewed. Acceptable, date of images: 9/2/21  - Minor PIH on left cheek and chin. Inflammatory acne x5 lesions in these areas as well. Much improved comedonal acne on the forehead.  - No other lesions of concern on areas examined.     Medications:  Current Outpatient Medications   Medication     albuterol (PROAIR HFA/PROVENTIL HFA/VENTOLIN HFA) 108 (90 Base) MCG/ACT inhaler     amphetamine-dextroamphetamine (ADDERALL XR) 10 MG 24 hr capsule     cetirizine (ZYRTEC) 10 MG tablet     clindamycin (CLEOCIN T) 1 % external lotion     EPINEPHrine (EPIPEN/ADRENACLICK/OR ANY BX GENERIC EQUIV) 0.3 MG/0.3ML injection 2-pack     famotidine (PEPCID) 20 MG tablet     levonorgestrel (MIRENA, 52 MG,) 20 MCG/24HR IUD     loratadine (CLARITIN) 10 MG tablet     montelukast (SINGULAIR) 10 MG tablet     tretinoin (RETIN-A) 0.025 % external cream     tretinoin (RETIN-A) 0.05 % external cream     albuterol (PROVENTIL) (2.5 MG/3ML) 0.083% neb solution     spironolactone (ALDACTONE) 50 MG tablet     No current facility-administered medications for this visit.      Past Medical History:   Patient Active Problem List   Diagnosis     Obesity     Attention deficit hyperactivity disorder (ADHD)     Disturbance in sleep behavior     Acanthosis nigricans     Acne     Seasonal allergies     Eczema     Vitamin D deficiency     Insulin resistance     Anisometropia     Dysmetabolic syndrome X     Myopia     Enchondroma of wrist or hand     Osteoma     Café au lait spot     Cervicalgia     Chronic midline thoracic back pain     Morbid obesity (H)     Past Medical History:   Diagnosis Date     Allergic conjunctivitis      Anisometropia      Asthma      Attention deficit disorder with hyperactivity(314.01)     stopped medication 1 year ago     Myopia       Nonorganic enuresis     voiding dysfunction      Obesity, unspecified      Wears contact lenses         CC No referring provider defined for this encounter. on close of this encounter.      Again, thank you for allowing me to participate in the care of your patient.        Sincerely,        Danica Lopez MD

## 2021-09-02 NOTE — PROGRESS NOTES
"[facility] Dermatology Note  Encounter Date: Sep 2, 2021  Location of teledermatologist: [facility]. Start time: 3:30pm. End time: 3:45pm.    Dermatology Problem List:  1. Acne vulgaris  - Spironolactone (50 mg bid initiated 3/15/21, dropped to 25mg daily due to dizziness 6/2021, trying to steadily increase now)   - Morning topicals: BPO wash, azelaic acid, clindamycin 1% lotion daily as spot treatment   - Bedtime topicals: gentle cleanser, Tretinoin 0.05% cream at bedtime (increased to this strength on 9/2/21)  - S/p chemical peels x3  2 Post inflammatory hyperpigmentation  - did not tolerate hydroquinone in the past  - s/p skinceuticals micropeel plus x3    Social history: Currently in  school.  ____________________________________________    ASSESSMENT/PLAN:    # Acne vulgaris. Chronic, not yet at goal. Still with new cystic acne appearing on the chin. Patient notes the side effect of dizziness when she would get up when on spironolactone 50mg BID. She decreased these to 25mg daily and the dizziness stopped. I would recommend gradually increasing this medication. For the next two weeks, plan on 1/2 of pill (25mg) in the morning and in the evening. If tolerated, increase to 1/2 of pill (25mg) in the morning and an entire pill (50mg) in the evening. If tolerated after two weeks, can increase to 50mg in the morning and 50mg in the evening. Patient aware she should not get pregnant while on this medication. Notes no side effects of breast tenderness, headaches, muscle cramps, spotting, tetragenic effects.   - AM: Start BPO wash. Recommended Neutrogena Clear Pore Cleanser or CeraVe Acne Foaming Cream Cleanser.   - AM: Start azelaic acid in the am. Sent rx. Discussed \"Debbie's choice\" on amazon, if this is not covered by insurance.  - AM: Continue clindamycin 1% lotion as spot treatment for any acne.  - PM: Continue cetaphil gentle facial cleanser.  - PM: " "Increase to tretinoin 0.05% cream.  - Hold chemical peels at this time. Can consider this in the future.    Procedures Performed:   None.    Follow-up: 3 month in-person, or earlier for new or changing lesions    Staff and Scribe:     Scribe Disclosure:   I, Yesi Wong, am serving as a scribe to document services personally performed by this physician, Dr. Danica Lopez, based on data collection and the provider's statements to me.     Provider Disclosure:   The documentation recorded by the scribe accurately reflects the services I personally performed and the decisions made by me.    Danica Lopez MD    Department of Dermatology  [facility]: Phone: [phone], Fax:[phone]  [facility]: Phone: [phone], Fax: [phone]    ____________________________________________    CC: Derm Problem (acne seems to be better. Hyperpigmentation has improved. Pt is on spiriactalone and has done facial peels. Pt states \"They seemed to work, but I want to see if there's anything that would help more?\")    HPI:  Ms. Juanita Gonzalez is a(n) 25 year old female who presents today as a return patient for chemical peel follow up.    Patient was last seen on 7/19/21 for a chemical peel.    Today patient notes acne seems to be doing better. The hyperpigmentation has improved. Patient currently on spironolactone and has done chemical peels. These seemed to work, but she is wondering if there is anything else that would help more. Got dizziness with full dose of spironolactone so has been taking only 25mg daily. This does not seem to be as effective.    Patient is otherwise feeling well, without additional concerns.    Labs:  NA    Physical exam:  Vitals: There were no vitals taken for this visit.  SKIN: Telemedicine photographs reviewed. Acceptable, date of images: 9/2/21  - Minor PIH on " left cheek and chin. Inflammatory acne x5 lesions in these areas as well. Much improved comedonal acne on the forehead.  - No other lesions of concern on areas examined.     Medications:  Current Outpatient Medications   Medication     albuterol (PROAIR HFA/PROVENTIL HFA/VENTOLIN HFA) 108 (90 Base) MCG/ACT inhaler     amphetamine-dextroamphetamine (ADDERALL XR) 10 MG 24 hr capsule     cetirizine (ZYRTEC) 10 MG tablet     clindamycin (CLEOCIN T) 1 % external lotion     EPINEPHrine (EPIPEN/ADRENACLICK/OR ANY BX GENERIC EQUIV) 0.3 MG/0.3ML injection 2-pack     famotidine (PEPCID) 20 MG tablet     levonorgestrel (MIRENA, 52 MG,) 20 MCG/24HR IUD     loratadine (CLARITIN) 10 MG tablet     montelukast (SINGULAIR) 10 MG tablet     tretinoin (RETIN-A) 0.025 % external cream     tretinoin (RETIN-A) 0.05 % external cream     albuterol (PROVENTIL) (2.5 MG/3ML) 0.083% neb solution     spironolactone (ALDACTONE) 50 MG tablet     No current facility-administered medications for this visit.      Past Medical History:   Patient Active Problem List   Diagnosis     Obesity     Attention deficit hyperactivity disorder (ADHD)     Disturbance in sleep behavior     Acanthosis nigricans     Acne     Seasonal allergies     Eczema     Vitamin D deficiency     Insulin resistance     Anisometropia     Dysmetabolic syndrome X     Myopia     Enchondroma of wrist or hand     Osteoma     Café au lait spot     Cervicalgia     Chronic midline thoracic back pain     Morbid obesity (H)     Past Medical History:   Diagnosis Date     Allergic conjunctivitis      Anisometropia      Asthma      Attention deficit disorder with hyperactivity(314.01)     stopped medication 1 year ago     Myopia      Nonorganic enuresis     voiding dysfunction      Obesity, unspecified      Wears contact lenses         CC No referring provider defined for this encounter. on close of this encounter.

## 2021-09-02 NOTE — PATIENT INSTRUCTIONS
"In the AM:  - I recommend using a benzoyl peroxide wash. My preferred ones are Neutrogena Clear Pore Cleanser or CeraVe Acne Foaming Cream Cleanser.  - Then apply azelaic acid. If this is not covered by insurance, you can get \"Debbie's Choice\" which can be found on www.Amazon.com.  - Then apply clindamycin 1% lotion to spot treat any areas.      In the PM:  - Use cetaphil gentle cleanser.  - Apply tretinoin 0.05% cream.      For the oral medication:  - I would recommend gradually increasing this medication. For the next two weeks, plan on 1/2 of pill (25mg) in the morning 1/2 pill (25mg) and in the evening. If tolerated, you can increase to 1/2 of pill (25mg) in the morning and an entire pill (50mg) in the evening. If tolerated, you can increase to 50mg in the morning and 50mg in the evening.  "

## 2021-09-02 NOTE — LETTER
September 16, 2021      Juanita Gonzalez   [id number]  [facility] [address]        Dear Juanita Gonzalez,    In order to ensure that we are providing the best quality care, we would like to remind you that per your last visit with Dr Lopez you should:      Please cancel visit with Alyssa 9/27/21    Schedule a return in person visit with Dr Lopez around 12/2/21     We have been unable to reach you by phone (or [facility]). Please call your clinic or use [facility] to make an appointment with your provider. If any medication is prescribed to you by this provider, please be sure to schedule on or around the date stated above so you do not run out of medication.  Please let us know if you have any questions and we would be happy to help.     Thank you for trusting us with your care.    Sincerely,     [facility]  [phone]

## 2021-09-02 NOTE — NURSING NOTE
"Juanita Gonzalez's goals for this visit include:   Chief Complaint   Patient presents with     Derm Problem     acne seems to be better. Hyperpigmentation has improved. Pt is on spiriactalone and has done facial peels. Pt states \"They seemed to work, but I want to see if there's anything that would help more?\"       She requests these members of her care team be copied on today's visit information:     PCP: Adwoa Calabrese    Referring Provider:  No referring provider defined for this encounter.    There were no vitals taken for this visit.    Do you need any medication refills at today's visit? No    RASHI Rosenthal.        "

## 2021-09-04 ENCOUNTER — HEALTH MAINTENANCE LETTER (OUTPATIENT)
Age: 26
End: 2021-09-04

## 2021-09-09 NOTE — TELEPHONE ENCOUNTER
2nd attempt, called patient and left message to schedule as noted below.    Per the last visit with Dr Lopez patient should schedule the following appointments:      Please cancel visit with Alyssa 9/27/21    Schedule a return in person visit with Dr Lopez around 12/2/21      Call center please assist patient with scheduling these appointments.      Ashwini Ford  Surgical Specialties Procedure   [facility] [place]  9/9/2021 10:18 AM

## 2021-09-10 ENCOUNTER — LAB (OUTPATIENT)
Dept: LAB | Facility: CLINIC | Age: 26
End: 2021-09-10

## 2021-09-10 DIAGNOSIS — Z20.822 ENCOUNTER FOR LABORATORY TESTING FOR COVID-19 VIRUS: ICD-10-CM

## 2021-09-10 PROCEDURE — U0005 INFEC AGEN DETEC AMPLI PROBE: HCPCS | Performed by: NURSE PRACTITIONER

## 2021-09-11 LAB — SARS-COV-2 RNA RESP QL NAA+PROBE: NEGATIVE

## 2021-09-15 ENCOUNTER — THERAPY VISIT (OUTPATIENT)
Dept: PHYSICAL THERAPY | Facility: CLINIC | Age: 26
End: 2021-09-15
Payer: COMMERCIAL

## 2021-09-15 DIAGNOSIS — F90.0 ATTENTION DEFICIT HYPERACTIVITY DISORDER (ADHD), PREDOMINANTLY INATTENTIVE TYPE: ICD-10-CM

## 2021-09-15 DIAGNOSIS — M54.6 CHRONIC MIDLINE THORACIC BACK PAIN: ICD-10-CM

## 2021-09-15 DIAGNOSIS — M54.2 CERVICALGIA: ICD-10-CM

## 2021-09-15 DIAGNOSIS — G89.29 CHRONIC MIDLINE THORACIC BACK PAIN: ICD-10-CM

## 2021-09-15 PROCEDURE — 97112 NEUROMUSCULAR REEDUCATION: CPT | Mod: GP | Performed by: PHYSICAL THERAPY ASSISTANT

## 2021-09-15 PROCEDURE — 97140 MANUAL THERAPY 1/> REGIONS: CPT | Mod: GP | Performed by: PHYSICAL THERAPY ASSISTANT

## 2021-09-15 PROCEDURE — 97110 THERAPEUTIC EXERCISES: CPT | Mod: GP | Performed by: PHYSICAL THERAPY ASSISTANT

## 2021-09-15 NOTE — TELEPHONE ENCOUNTER
Reason for Call:  Medication or medication refill:    Do you use a [facility]?  Name of the pharmacy and phone number for the current request:  [facility] [place], [place] - [address] [place] [address] [phone]    Name of the medication requested: amphetamine-dextroamphetamine (ADDERALL XR) 10 MG 24 hr capsule    Other request: Pt calling and would like to see if she can received her  Rx by the end of the week as soon as possible.    Can we leave a detailed message on this number? YES    Phone number patient can be reached at: Home number on file [phone] (home)    Best Time: anytime    Call taken on 9/15/2021 at 1:30 PM by Guru Laura

## 2021-09-16 NOTE — TELEPHONE ENCOUNTER
3rd attempt. Patient has not called to schedule.  Appointment reminder letter sent to patient.      Ashwini Ford  Surgical Specialties Procedure   [employer] [facility] [place]  9/16/2021 7:54 AM

## 2021-09-17 RX ORDER — DEXTROAMPHETAMINE SACCHARATE, AMPHETAMINE ASPARTATE MONOHYDRATE, DEXTROAMPHETAMINE SULFATE AND AMPHETAMINE SULFATE 2.5; 2.5; 2.5; 2.5 MG/1; MG/1; MG/1; MG/1
10 CAPSULE, EXTENDED RELEASE ORAL DAILY
Qty: 30 CAPSULE | Refills: 0 | Status: SHIPPED | OUTPATIENT
Start: 2021-11-16 | End: 2022-03-15

## 2021-09-17 RX ORDER — DEXTROAMPHETAMINE SACCHARATE, AMPHETAMINE ASPARTATE MONOHYDRATE, DEXTROAMPHETAMINE SULFATE AND AMPHETAMINE SULFATE 2.5; 2.5; 2.5; 2.5 MG/1; MG/1; MG/1; MG/1
10 CAPSULE, EXTENDED RELEASE ORAL DAILY
Qty: 30 CAPSULE | Refills: 0 | Status: SHIPPED | OUTPATIENT
Start: 2021-09-17 | End: 2022-03-15

## 2021-09-17 RX ORDER — DEXTROAMPHETAMINE SACCHARATE, AMPHETAMINE ASPARTATE MONOHYDRATE, DEXTROAMPHETAMINE SULFATE AND AMPHETAMINE SULFATE 2.5; 2.5; 2.5; 2.5 MG/1; MG/1; MG/1; MG/1
10 CAPSULE, EXTENDED RELEASE ORAL DAILY
Qty: 30 CAPSULE | Refills: 0 | Status: SHIPPED | OUTPATIENT
Start: 2021-10-17 | End: 2022-03-15

## 2021-12-10 ENCOUNTER — OFFICE VISIT (OUTPATIENT)
Dept: OBGYN | Facility: CLINIC | Age: 26
End: 2021-12-10
Attending: OBSTETRICS & GYNECOLOGY
Payer: COMMERCIAL

## 2021-12-10 VITALS
SYSTOLIC BLOOD PRESSURE: 114 MMHG | BODY MASS INDEX: 38.7 KG/M2 | WEIGHT: 226.7 LBS | HEIGHT: 64 IN | HEART RATE: 90 BPM | DIASTOLIC BLOOD PRESSURE: 73 MMHG

## 2021-12-10 DIAGNOSIS — Z30.431 IUD CHECK UP: Primary | ICD-10-CM

## 2021-12-10 PROCEDURE — 99213 OFFICE O/P EST LOW 20 MIN: CPT | Performed by: OBSTETRICS & GYNECOLOGY

## 2021-12-10 PROCEDURE — G0463 HOSPITAL OUTPT CLINIC VISIT: HCPCS

## 2021-12-10 ASSESSMENT — MIFFLIN-ST. JEOR: SCORE: 1763.3

## 2021-12-10 NOTE — LETTER
12/10/2021       RE: Juanita Gonzalez  [id number]  [facility] [address]     Dear Colleague,    Thank you for referring your patient, Juanita Gonzalez, to the [facility] [place] at [facility]. Please see a copy of my visit note below.    [facility]  Gynecology Problem Visit    SUBJECTIVE    Juanita Gonzalez is a 26 year old G0 who is here for a string check. Juanita has had cramping and random spotting over the last few weeks, as well as some sharp right sided pains. She is worried that the IUD isn't in the right spot.    Overall, Juanita has felt anxious since the IUD was placed, as she has had lots of random cramping and spotting and she doesn't know where she is in her cycle. She is considering having the IUD taken out.    Her LMP is: No LMP recorded. (Menstrual status: IUD).    PAST MEDICAL HISTORY  Past Medical History:   Diagnosis Date     Allergic conjunctivitis      Anisometropia      Asthma      Attention deficit disorder with hyperactivity(314.01)     stopped medication 1 year ago     Myopia      Nonorganic enuresis     voiding dysfunction      Obesity, unspecified      Wears contact lenses        MEDICATIONS  Current Outpatient Medications   Medication     albuterol (PROAIR HFA/PROVENTIL HFA/VENTOLIN HFA) 108 (90 Base) MCG/ACT inhaler     albuterol (PROVENTIL) (2.5 MG/3ML) 0.083% neb solution     amphetamine-dextroamphetamine (ADDERALL XR) 10 MG 24 hr capsule     amphetamine-dextroamphetamine (ADDERALL XR) 10 MG 24 hr capsule     amphetamine-dextroamphetamine (ADDERALL XR) 10 MG 24 hr capsule     azelaic acid (AZELEX) 20 % external cream     cetirizine (ZYRTEC) 10 MG tablet     clindamycin (CLEOCIN T) 1 % external lotion     EPINEPHrine (EPIPEN/ADRENACLICK/OR ANY BX GENERIC EQUIV) 0.3 MG/0.3ML injection 2-pack     famotidine (PEPCID) 20 MG tablet     levonorgestrel (MIRENA, 52 MG,) 20 MCG/24HR  "IUD     loratadine (CLARITIN) 10 MG tablet     montelukast (SINGULAIR) 10 MG tablet     spironolactone (ALDACTONE) 50 MG tablet     tretinoin (RETIN-A) 0.025 % external cream     tretinoin (RETIN-A) 0.05 % external cream     No current facility-administered medications for this visit.       ALLERGIES  Allergies   Allergen Reactions     Bactrim Hives       REVIEW OF SYSTEMS  A 10 point review of systems including Constitutional, Eyes, Respiratory, Cardiovascular, Gastroenterology, Genitourinary, Integumentary, Musculoskeletal, and Psychiatric, were all negative, except for pertinent positives noted in the above HPI.    OBJECTIVE  /73 (BP Location: Right arm, Patient Position: Chair)   Pulse 90   Ht 1.626 m (5' 4\")   Wt 102.8 kg (226 lb 11.2 oz)   Breastfeeding No   BMI 38.91 kg/m      General: Alert, without distress   HEENT: normocephalic, without obvious abnormality   Pelvic: normal external female genitalia; normal vagina with physiologic discharge; normal cervix without lesions/masses and IUD strings present; normal anus/perineum   Extremities: normal    ASSESSMENT  Juanita Gonzalez is a 26 year old G0 who is here for an IUD check up.    PLAN  -offered US for peace of mind that IUD isn't malpositioned, though string check normal. She will schedule at her convenience.  -discussed that contraception should provide peace of mind and that she may change her contraception at any time if she desires. I referred Juanita to [email or web address] to consider her options and that she may make an appointment with me at any time for an IUD removal/provision of other contraception.    Alicia Nieves MD, MSCI    [facility]/OBGYN    "

## 2021-12-10 NOTE — PROGRESS NOTES
[facility]  Gynecology Problem Visit    SUBJECTIVE    Juanita Gonzalez is a 26 year old G0 who is here for a string check. Juanita has had cramping and random spotting over the last few weeks, as well as some sharp right sided pains. She is worried that the IUD isn't in the right spot.    Overall, Juanita has felt anxious since the IUD was placed, as she has had lots of random cramping and spotting and she doesn't know where she is in her cycle. She is considering having the IUD taken out.    Her LMP is: No LMP recorded. (Menstrual status: IUD).    PAST MEDICAL HISTORY  Past Medical History:   Diagnosis Date     Allergic conjunctivitis      Anisometropia      Asthma      Attention deficit disorder with hyperactivity(314.01)     stopped medication 1 year ago     Myopia      Nonorganic enuresis     voiding dysfunction      Obesity, unspecified      Wears contact lenses        MEDICATIONS  Current Outpatient Medications   Medication     albuterol (PROAIR HFA/PROVENTIL HFA/VENTOLIN HFA) 108 (90 Base) MCG/ACT inhaler     albuterol (PROVENTIL) (2.5 MG/3ML) 0.083% neb solution     amphetamine-dextroamphetamine (ADDERALL XR) 10 MG 24 hr capsule     amphetamine-dextroamphetamine (ADDERALL XR) 10 MG 24 hr capsule     amphetamine-dextroamphetamine (ADDERALL XR) 10 MG 24 hr capsule     azelaic acid (AZELEX) 20 % external cream     cetirizine (ZYRTEC) 10 MG tablet     clindamycin (CLEOCIN T) 1 % external lotion     EPINEPHrine (EPIPEN/ADRENACLICK/OR ANY BX GENERIC EQUIV) 0.3 MG/0.3ML injection 2-pack     famotidine (PEPCID) 20 MG tablet     levonorgestrel (MIRENA, 52 MG,) 20 MCG/24HR IUD     loratadine (CLARITIN) 10 MG tablet     montelukast (SINGULAIR) 10 MG tablet     spironolactone (ALDACTONE) 50 MG tablet     tretinoin (RETIN-A) 0.025 % external cream     tretinoin (RETIN-A) 0.05 % external cream     No current facility-administered medications for this visit.       ALLERGIES  Allergies   Allergen  "Reactions     Bactrim Hives       REVIEW OF SYSTEMS  A 10 point review of systems including Constitutional, Eyes, Respiratory, Cardiovascular, Gastroenterology, Genitourinary, Integumentary, Musculoskeletal, and Psychiatric, were all negative, except for pertinent positives noted in the above HPI.    OBJECTIVE  /73 (BP Location: Right arm, Patient Position: Chair)   Pulse 90   Ht 1.626 m (5' 4\")   Wt 102.8 kg (226 lb 11.2 oz)   Breastfeeding No   BMI 38.91 kg/m      General: Alert, without distress   HEENT: normocephalic, without obvious abnormality   Pelvic: normal external female genitalia; normal vagina with physiologic discharge; normal cervix without lesions/masses and IUD strings present; normal anus/perineum   Extremities: normal    ASSESSMENT  Juanita Gonzalez is a 26 year old G0 who is here for an IUD check up.    PLAN  -offered US for peace of mind that IUD isn't malpositioned, though string check normal. She will schedule at her convenience.  -discussed that contraception should provide peace of mind and that she may change her contraception at any time if she desires. I referred Juanita to [email or web address] to consider her options and that she may make an appointment with me at any time for an IUD removal/provision of other contraception.    Alicia Nieves MD, MSCI    [facility]  "

## 2021-12-10 NOTE — NURSING NOTE
Chief Complaint   Patient presents with     IUD     Mirena IUD check. Been cramping consistently x 3 weeks. Have some sharp pains. Worried that IUD moved. Have not tried to feel for strings. Is scared to.       See MARITZA Lopez 12/10/2021

## 2022-01-03 ENCOUNTER — NURSE TRIAGE (OUTPATIENT)
Dept: NURSING | Facility: CLINIC | Age: 27
End: 2022-01-03
Payer: COMMERCIAL

## 2022-01-03 NOTE — TELEPHONE ENCOUNTER
Pt is calling.    Possible COVID.  HA, chest tightness, dry cough.  History of asthma. Nasal congestion.  Increase fatigue.  Would like COVID testing.  Denies fever.   Denies chest pain, wheezing, difficulty breathing. Chest feels tight, but she is using her Albuterol inhaler and this does help with the chest tightness.     Care advice reviewed. I advised her to do an E-Visit. Log into [employer] for that.  She verbalized understanding and will log in to do the E-Visit.    COVID-19 testing at [facility] is by appointment only. You'll need to schedule a time to get tested. If you have symptoms (signs) of COVID, please log in to [employer] to complete an e-visit (virtual visit). This is the first step to getting tested.    If you don't have COVID symptoms and want to get tested, you should also log in to [employer] for an e-visit. This includes people who:    have had close contact with a COVID-positive person    want to be tested before or after travel    have taken part in high-risk activities    have a school testing mandate, or     were told to get tested by their care team or the health department.     A [employer] e-visit is the fastest way for you to be seen by our care team. Please choose  Next available provider  to complete an e-visit. When you choose this option, the average response time is less than one hour.  After the e-visit, you'll be able to self-schedule your test at one of our testing locations. To learn more about our testing locations or for other details, please visit our COVID-19 Resource Hub.    [employer] is also the fastest way to get your test results. You'll get your results in [employer] within 3 days. If you don't use [employer], you'll get your results in the mail in 7 to 10 days. If your test is positive and you don't view your result in [employer] within 1 business day, you'll get a phone call with your result. A positive result means that you have COVID-19.    If you have an upcoming procedure at   [facility], you'll need to be tested for COVID. The test needs to happen 2 to 4 days before your procedure. If you have an upcoming procedure, we will contact you to schedule a COVID test.    If you don't have a [employer] account, please call [phone] to set up a virtual visit. You can also find community testing sites in [place] at [place]/get-tested/testing-locations. If you live in [place], please visit [email or web address].      COVID 19 Nurse Triage Plan/Patient Instructions    Please be aware that novel coronavirus (COVID-19) may be circulating in the community. If you develop symptoms such as fever, cough, or SOB or if you have concerns about the presence of another infection including coronavirus (COVID-19), please contact your health care provider or visit [email or web address].     Disposition/Instructions    Virtual Visit with provider recommended. Reference Visit Selection Guide.    Thank you for taking steps to prevent the spread of this virus.  o Limit your contact with others.  o Wear a simple mask to cover your cough.  o Wash your hands well and often.    Resources    [facility]: About COVID-19: [email or web address]    CDC: What to Do If You're Sick: www.cdc.gov/coronavirus/2019-ncov/about/steps-when-sick.html    CDC: Ending Home Isolation: www.cdc.gov/coronavirus/2019-ncov/hcp/disposition-in-home-patients.html     CDC: Caring for Someone: www.cdc.gov/coronavirus/2019-ncov/if-you-are-sick/care-for-someone.html     [facility]: Interim Guidance for Hospital Discharge to Home: www.health.state.[place]/diseases/coronavirus/hcp/hospdischarge.pdf    [facility] clinical trials (COVID-19 research studies): [email or web address]     Below are the COVID-19 hotlines at the [place] Department of Health ([facility]). Interpreters are available.   o For health questions: Call 807-356-6791 or 1-571.701.4867 (7 a.m. to  7 p.m.)  o For questions about schools and childcare: Call 838-168-0079 or 1-954.671.6254 (7 a.m. to 7 p.m.)     Reason for Disposition    COVID-19 Testing, questions about    Additional Information    Negative: SEVERE difficulty breathing (e.g., struggling for each breath, speaks in single words)    Negative: Difficult to awaken or acting confused (e.g., disoriented, slurred speech)    Negative: Bluish (or gray) lips or face now    Negative: Shock suspected (e.g., cold/pale/clammy skin, too weak to stand, low BP, rapid pulse)    Negative: Sounds like a life-threatening emergency to the triager    Negative: [1] COVID-19 exposure AND [2] no symptoms    Negative: COVID-19 vaccine reaction suspected (e.g., fever, headache, muscle aches) occurring 1 to 3 days after getting vaccine    Negative: COVID-19 vaccine, questions about    Negative: [1] Lives with someone known to have influenza (flu test positive) AND [2] flu-like symptoms (e.g., cough, runny nose, sore throat, SOB; with or without fever)    Negative: [1] Adult with possible COVID-19 symptoms AND [2] triager concerned about severity of symptoms or other causes    Negative: COVID-19 and breastfeeding, questions about    Negative: SEVERE or constant chest pain or pressure (Exception: mild central chest pain, present only when coughing)    Negative: MODERATE difficulty breathing (e.g., speaks in phrases, SOB even at rest, pulse 100-120)    Negative: [1] Headache AND [2] stiff neck (can't touch chin to chest)    Negative: MILD difficulty breathing (e.g., minimal/no SOB at rest, SOB with walking, pulse <100)    Negative: Chest pain or pressure    Negative: Patient sounds very sick or weak to the triager    Negative: Fever > 103 F (39.4 C)    Negative: [1] Fever > 101 F (38.3 C) AND [2] age > 60 years    Negative: [1] Fever > 100.0 F (37.8 C) AND [2] bedridden (e.g., nursing home patient, CVA, chronic illness, recovering from surgery)    Negative: HIGH RISK for severe  COVID complications (e.g., age > 64 years, obesity with BMI > 25, pregnant, chronic lung disease or other chronic medical condition)  (Exception: Already seen by PCP and no new or worsening symptoms.)    Negative: [1] HIGH RISK patient AND [2] influenza is widespread in the community AND [3] ONE OR MORE respiratory symptoms: cough, sore throat, runny or stuffy nose    Negative: [1] HIGH RISK patient AND [2] influenza exposure within the last 7 days AND [3] ONE OR MORE respiratory symptoms: cough, sore throat, runny or stuffy nose    Negative: [1] COVID-19 infection suspected by caller or triager AND [2] mild symptoms (cough, fever, or others) AND [3] negative COVID-19 rapid test    Negative: Fever present > 3 days (72 hours)    Negative: [1] Fever returns after gone for over 24 hours AND [2] symptoms worse or not improved    Negative: [1] Continuous (nonstop) coughing interferes with work or school AND [2] no improvement using cough treatment per protocol    Negative: Cough present > 3 weeks    Negative: [1] COVID-19 diagnosed by positive lab test AND [2] NO symptoms (e.g., cough, fever, others)    Negative: [1] COVID-19 diagnosed by positive lab test AND [2] mild symptoms (e.g., cough, fever, others) AND [3] no complications or SOB    Negative: [1] COVID-19 diagnosed by doctor (or NP/PA) AND [2] mild symptoms (e.g., cough, fever, others) AND [3] no complications or SOB    Negative: [1] COVID-19 diagnosed AND [2] has mild nausea, vomiting or diarrhea    Negative: COVID-19 Home Isolation, questions about    Protocols used: CORONAVIRUS (COVID-19) DIAGNOSED OR [id number]-YL 8.25.2021    Lindy García RN  [facility] Nurse Advisor  1/3/2022 at 5:05 PM

## 2022-02-19 ENCOUNTER — HEALTH MAINTENANCE LETTER (OUTPATIENT)
Age: 27
End: 2022-02-19

## 2022-03-15 ENCOUNTER — VIRTUAL VISIT (OUTPATIENT)
Dept: INTERNAL MEDICINE | Facility: CLINIC | Age: 27
End: 2022-03-15
Payer: COMMERCIAL

## 2022-03-15 DIAGNOSIS — Z86.16 HISTORY OF COVID-19: ICD-10-CM

## 2022-03-15 DIAGNOSIS — F51.04 PSYCHOPHYSIOLOGICAL INSOMNIA: ICD-10-CM

## 2022-03-15 DIAGNOSIS — F90.0 ATTENTION DEFICIT HYPERACTIVITY DISORDER (ADHD), PREDOMINANTLY INATTENTIVE TYPE: Primary | ICD-10-CM

## 2022-03-15 DIAGNOSIS — Z78.9 EPISODE OF BINGE CONSUMPTION OF ALCOHOL: ICD-10-CM

## 2022-03-15 PROCEDURE — 99214 OFFICE O/P EST MOD 30 MIN: CPT | Mod: 95 | Performed by: NURSE PRACTITIONER

## 2022-03-15 RX ORDER — DEXTROAMPHETAMINE SACCHARATE, AMPHETAMINE ASPARTATE MONOHYDRATE, DEXTROAMPHETAMINE SULFATE AND AMPHETAMINE SULFATE 2.5; 2.5; 2.5; 2.5 MG/1; MG/1; MG/1; MG/1
10 CAPSULE, EXTENDED RELEASE ORAL DAILY
Qty: 30 CAPSULE | Refills: 0 | Status: SHIPPED | OUTPATIENT
Start: 2022-03-15 | End: 2022-04-19

## 2022-03-15 RX ORDER — DEXTROAMPHETAMINE SACCHARATE, AMPHETAMINE ASPARTATE MONOHYDRATE, DEXTROAMPHETAMINE SULFATE AND AMPHETAMINE SULFATE 2.5; 2.5; 2.5; 2.5 MG/1; MG/1; MG/1; MG/1
10 CAPSULE, EXTENDED RELEASE ORAL DAILY
Qty: 30 CAPSULE | Refills: 0 | Status: SHIPPED | OUTPATIENT
Start: 2022-05-14 | End: 2022-08-02

## 2022-03-15 RX ORDER — DEXTROAMPHETAMINE SACCHARATE, AMPHETAMINE ASPARTATE MONOHYDRATE, DEXTROAMPHETAMINE SULFATE AND AMPHETAMINE SULFATE 2.5; 2.5; 2.5; 2.5 MG/1; MG/1; MG/1; MG/1
10 CAPSULE, EXTENDED RELEASE ORAL DAILY
Qty: 30 CAPSULE | Refills: 0 | Status: SHIPPED | OUTPATIENT
Start: 2022-04-14 | End: 2022-04-19

## 2022-03-15 NOTE — NURSING NOTE
Juanita Gonzalez is a 26 year old female patient that presents today in clinic for the following:    No chief complaint on file.    The patient's allergies and medications were reviewed as noted. A set of vitals were recorded as noted without incident. The patient does not have any other questions for the provider.    Oj Mosquera, EMT at 11:25 AM on 3/15/2022

## 2022-03-15 NOTE — PROGRESS NOTES
"Juanita is a 26 year old who is being evaluated via a billable video visit.      How would you like to obtain your AVS? MyChart  If the video visit is dropped, the invitation should be resent by: Send to e-mail at: [email or web address]  Will anyone else be joining your video visit? No      Video Start Time: 11:35 AM    Assessment & Plan     Attention deficit hyperactivity disorder (ADHD), predominantly inattentive type  Doing well with current regimen, refill provided.   - amphetamine-dextroamphetamine (ADDERALL XR) 10 MG 24 hr capsule; Take 1 capsule (10 mg) by mouth daily  - amphetamine-dextroamphetamine (ADDERALL XR) 10 MG 24 hr capsule; Take 1 capsule (10 mg) by mouth daily  - amphetamine-dextroamphetamine (ADDERALL XR) 10 MG 24 hr capsule; Take 1 capsule (10 mg) by mouth daily    Psychophysiological insomnia  Difficulty falling asleep regardless of whether or not taking Adderall; notes mind racing. Recommend sleep hygiene with regular bedtime/wake time, avoid screen time for 1-2 hour prior to bed, and melatonin 5 mg.   - melatonin 5 MG tablet; Take 1 tablet (5 mg) by mouth nightly as needed for sleep    History of COVID-19  3rd +COVID around Jan 1st 2022, with symptoms persisting into mid-February. Continues to have SOB with lying down, recommend follow-up in clinic as scheduled for pulmonary assessment, and use albuterol inhaler 15-30 min prior to bedtime.     Episode of binge consumption of alcohol  Notes she has cut back from prior alcohol use, but reports \"blacking out\" with drinking up to 4 drinks in an evening. Reviewed high-risk with impacting behaviors, as well as risk for over-use and alcohol poisoning. Advised no more than 1 drink in a given day. She agrees with the plan.    MDM: 2+ problem visit, prescription drug management    Follow-up in about 2 weeks as scheduled for routine physical.    [facility] [place]    Kanchan Grant " "is a 26 year old who presents for the following health issues     HPI     ADHD symptoms--overall good. 10 mg feels \"perfect\", notices needs more during school and can tell when she's off of it. Mind doesn't stop going; but functioning well if she stays on it. tolerating well and no SEs.  Will be starting new job at [employer], 7-11 am M-Th, rotating weekends. Going back to school in the fall, [employer], [employer] .   Hard to fall asleep whether or not she's on adderall. Mom gives her a lavendar spray for her pillow.   COVID first of the year. 3rd time she's gotten it. When lying down, difficult to catch her breath or certain positions. Hard to lay on her back. Has tried her inhaler/nebulizer. Neb made her feel dizzy so only did 2x. Each time she had COVID her symptoms were very different, the first time was much worse, lasted about 1 month, 2nd time a few weeks, this time coughed a lot but couldn't cough up as much. Now symptoms are resolved, even into mid-February felt sick. No longer has cough.    Stopped drinking for a while, was concerned about using too much, month cleanse but recognizes she blacks out. Used to drink more to excess.  Had 3 tequila shots and a canned drink, doesn't remember what she said or did.     Review of Systems   Constitutional, HEENT, cardiovascular, pulmonary, gi and gu systems are negative, except as otherwise noted.      Objective           Vitals:  No vitals were obtained today due to virtual visit.    Physical Exam   GENERAL: Healthy, alert and no distress  EYES: Eyes grossly normal to inspection.  No discharge or erythema, or obvious scleral/conjunctival abnormalities.  RESP: No audible wheeze, cough, or visible cyanosis.  No visible retractions or increased work of breathing.    SKIN: Visible skin clear. No significant rash, abnormal pigmentation or lesions.  NEURO: Cranial nerves grossly intact.  Mentation and speech appropriate for age.  PSYCH: Mentation appears " normal, affect normal/bright, judgement and insight intact, normal speech and appearance well-groomed.                Video-Visit Details    Type of service:  Video Visit    Video End Time:11:49 AM    Originating Location (pt. Location): Home    Distant Location (provider location):  [facility] INTER[facility] MEDIC[facility] [place]     Platform used for Video Visit: [employer]

## 2022-03-30 ENCOUNTER — OFFICE VISIT (OUTPATIENT)
Dept: INTERNAL MEDICINE | Facility: CLINIC | Age: 27
End: 2022-03-30
Payer: COMMERCIAL

## 2022-03-30 VITALS
TEMPERATURE: 98.3 F | HEART RATE: 81 BPM | HEIGHT: 64 IN | SYSTOLIC BLOOD PRESSURE: 94 MMHG | DIASTOLIC BLOOD PRESSURE: 63 MMHG | RESPIRATION RATE: 16 BRPM | WEIGHT: 231.2 LBS | BODY MASS INDEX: 39.47 KG/M2 | OXYGEN SATURATION: 97 %

## 2022-03-30 DIAGNOSIS — K64.4 EXTERNAL HEMORRHOIDS: ICD-10-CM

## 2022-03-30 DIAGNOSIS — F41.9 ANXIETY: ICD-10-CM

## 2022-03-30 DIAGNOSIS — K59.01 SLOW TRANSIT CONSTIPATION: ICD-10-CM

## 2022-03-30 DIAGNOSIS — F33.1 MODERATE EPISODE OF RECURRENT MAJOR DEPRESSIVE DISORDER (H): ICD-10-CM

## 2022-03-30 DIAGNOSIS — F51.04 PSYCHOPHYSIOLOGICAL INSOMNIA: ICD-10-CM

## 2022-03-30 DIAGNOSIS — Z00.00 ROUTINE HISTORY AND PHYSICAL EXAMINATION OF ADULT: Primary | ICD-10-CM

## 2022-03-30 DIAGNOSIS — F90.0 ATTENTION DEFICIT HYPERACTIVITY DISORDER (ADHD), PREDOMINANTLY INATTENTIVE TYPE: ICD-10-CM

## 2022-03-30 PROCEDURE — 90732 PPSV23 VACC 2 YRS+ SUBQ/IM: CPT | Performed by: NURSE PRACTITIONER

## 2022-03-30 PROCEDURE — 90471 IMMUNIZATION ADMIN: CPT | Performed by: NURSE PRACTITIONER

## 2022-03-30 PROCEDURE — 99395 PREV VISIT EST AGE 18-39: CPT | Mod: 25 | Performed by: NURSE PRACTITIONER

## 2022-03-30 RX ORDER — DOCUSATE SODIUM 100 MG/1
100 CAPSULE, LIQUID FILLED ORAL 2 TIMES DAILY PRN
Qty: 60 CAPSULE | Refills: 1 | Status: SHIPPED | OUTPATIENT
Start: 2022-03-30 | End: 2023-01-24

## 2022-03-30 RX ORDER — FLUOXETINE 10 MG/1
10 CAPSULE ORAL DAILY
Qty: 30 CAPSULE | Refills: 1 | Status: SHIPPED | OUTPATIENT
Start: 2022-03-30 | End: 2022-04-19

## 2022-03-30 ASSESSMENT — ASTHMA QUESTIONNAIRES
QUESTION_2 LAST FOUR WEEKS HOW OFTEN HAVE YOU HAD SHORTNESS OF BREATH: NOT AT ALL
ACT_TOTALSCORE: 25
QUESTION_4 LAST FOUR WEEKS HOW OFTEN HAVE YOU USED YOUR RESCUE INHALER OR NEBULIZER MEDICATION (SUCH AS ALBUTEROL): NOT AT ALL
QUESTION_1 LAST FOUR WEEKS HOW MUCH OF THE TIME DID YOUR ASTHMA KEEP YOU FROM GETTING AS MUCH DONE AT WORK, SCHOOL OR AT HOME: NONE OF THE TIME
QUESTION_3 LAST FOUR WEEKS HOW OFTEN DID YOUR ASTHMA SYMPTOMS (WHEEZING, COUGHING, SHORTNESS OF BREATH, CHEST TIGHTNESS OR PAIN) WAKE YOU UP AT NIGHT OR EARLIER THAN USUAL IN THE MORNING: NOT AT ALL
QUESTION_5 LAST FOUR WEEKS HOW WOULD YOU RATE YOUR ASTHMA CONTROL: COMPLETELY CONTROLLED
ACT_TOTALSCORE: 25

## 2022-03-30 ASSESSMENT — PAIN SCALES - GENERAL: PAINLEVEL: NO PAIN (0)

## 2022-03-30 ASSESSMENT — ANXIETY QUESTIONNAIRES
1. FEELING NERVOUS, ANXIOUS, OR ON EDGE: SEVERAL DAYS
2. NOT BEING ABLE TO STOP OR CONTROL WORRYING: SEVERAL DAYS
IF YOU CHECKED OFF ANY PROBLEMS ON THIS QUESTIONNAIRE, HOW DIFFICULT HAVE THESE PROBLEMS MADE IT FOR YOU TO DO YOUR WORK, TAKE CARE OF THINGS AT HOME, OR GET ALONG WITH OTHER PEOPLE: SOMEWHAT DIFFICULT
3. WORRYING TOO MUCH ABOUT DIFFERENT THINGS: MORE THAN HALF THE DAYS
GAD7 TOTAL SCORE: 6
6. BECOMING EASILY ANNOYED OR IRRITABLE: SEVERAL DAYS
7. FEELING AFRAID AS IF SOMETHING AWFUL MIGHT HAPPEN: NOT AT ALL
5. BEING SO RESTLESS THAT IT IS HARD TO SIT STILL: NOT AT ALL

## 2022-03-30 ASSESSMENT — PATIENT HEALTH QUESTIONNAIRE - PHQ9
SUM OF ALL RESPONSES TO PHQ QUESTIONS 1-9: 14
5. POOR APPETITE OR OVEREATING: SEVERAL DAYS

## 2022-03-30 NOTE — PATIENT INSTRUCTIONS
Patient Education     Understanding Hemorrhoids  Hemorrhoid tissues are  cushions  of blood vessels that swell slightly during bowel movements. Too much pressure on the anal canal can make these tissues stay enlarged. They may become inflamed and cause symptoms. This can happen both inside and outside the anal canal.     Parts of the anal canal  The parts of the anal canal are:     Internal hemorrhoid tissue is in the upper area of the anal canal.    The rectum is the last several inches of the colon. This is where stool is stored prior to bowel movements.    Anal sphincters are ring-shaped muscles that expand and contract to control the anal opening.    External hemorrhoid tissue lies under the anal skin.    The anus is the passage between the rectum and the outside of the body.  Normal hemorrhoid tissue  Hemorrhoid tissues play a vital role in helping your body get rid of waste. Food passes from the stomach through the intestines. The waste (stool) then travels through the colon to the rectum. It is stored in the rectum until it s ready to be passed from the anus. During bowel movements, hemorrhoids swell with blood and become slightly larger. This swelling helps protect and cushion the anal canal as stool passes from the body. Once the stool has passed, the tissues stop swelling and go back to normal.   Problem hemorrhoids  Pressure due to straining or other factors can cause hemorrhoid tissues to stay swollen. When this happens to the hemorrhoid tissues in the anal canal, they re called internal hemorrhoids. Swollen tissues around the anal opening are called external hemorrhoids. Depending on the location, your symptoms can differ.       Internal hemorrhoids often happen in clusters around the wall of the anal canal. They are often painless. But they may prolapse (protrude out of the anus) due to straining or pressure from hard stool. After the bowel movement is over, they may then reduce (go back inside the  body). Internal hemorrhoids often bleed. They can also discharge mucus.    External hemorrhoids are located at the anal opening, just beneath the skin. These tissues rarely cause problems unless they thrombose (form a blood clot). When this happens, a hard, bluish lump may appear. A thrombosed hemorrhoid also causes sudden, severe pain. In time, the clot may go away on its own. This sometimes leaves a  skin tag  of tissue stretched by the clot.  Hemorrhoid symptoms  Hemorrhoid symptoms may include:     Pain or a burning sensation    Bleeding during bowel movements    Protrusion of tissue from the anus    Feeling of blockage or fullness in the rectum during bowel movements    Itching around the anus    Mucus discharge from the anus  Causes of hemorrhoids  There s no single cause of hemorrhoids. Most often, though, they are caused by too much pressure on the anal canal. This can be due to:     Chronic (ongoing) constipation    Straining during bowel movements    Sitting too long on the toilet    Strenuous exercise or heavy lifting    Pregnancy and childbirth    Aging    Diarrhea  Alan last reviewed this educational content on 4/1/2019 2000-2021 [employer]. All rights reserved. This information is not intended as a substitute for professional medical care. Always follow your healthcare professional's instructions.

## 2022-03-30 NOTE — NURSING NOTE
Chief Complaint   Patient presents with     Physical     Patient comes in for a physical exam.         Nikita Coyle MA on 3/30/2022 at 2:13 PM

## 2022-03-30 NOTE — PROGRESS NOTES
SUBJECTIVE:  Juanita Gonzalez is a 26 year old female with pmh of   Patient Active Problem List   Diagnosis     Obesity     Attention deficit hyperactivity disorder (ADHD)     Disturbance in sleep behavior     Acanthosis nigricans     Acne     Seasonal allergies     Eczema     Vitamin D deficiency     Insulin resistance     Anisometropia     Dysmetabolic syndrome X     Myopia     Enchondroma of wrist or hand     Osteoma     Café au lait spot     Cervicalgia     Chronic midline thoracic back pain     Morbid obesity (H)     who comes in for preventive care examination today.   She has the following concerns    Feeling stressed/all over the place. Just started working at [employer], and also still working at [employer], is tired and working a lot.   Adderall threw her off a little bit. Picked up in the evening and started, took a couple days off to re-set, now feeling better taking in the AM.   Trouble with sleep and with work schedule, hard to get a good night sleep. Awake at 2:30 am today to start at 4 am, but schedule is variable, so hours are off.  Anxiety/Depression--Has a therapist at [facility], but doesn't feel it's helpful. Therapist has television on in the background, or was visiting her niece in the hospital during pt visit. Following with her for 1 year.  To manage mood, tried to start reading again or restart hobbies. Has been hard for her to focus on reading recently, would get frustrated, enjoys getting back into it. Drawing, going for walks, had been going to the gym but got covid and scared to go back into the world since. Has been dealing with anxiety since high school, but thought it was related to the stress of high school.  Feels jittery, can't be herself or comfortable.   Mom lives in [place]. Not in touch with her dad anymore. Uncle (alcoholic) and cousin here in [place].  Likes life, wants to be happy, some days more emotional and wonders if related to her period. Normally not an  "\"emotional\" person, but cried 3x last week. Talks to her mom regularly, doesn't always want to talk with her about \"adult stuff,\" like her credit score.  At times feels overwhelmed but would never do anything to hurt herself, wants to be here and enjoys life.   Drinks less than she used to. Went 30 days without drinking at the beginning of the year as a cleanse. Can't just have 1 drink; sometimes doesn't remember the next day. Has been drinking since age 19, used to be able to drink more, now max 3 drinks, otherwise blacks out if overdoes it, doesn't remember things the next day.  Per chart review, took Fluoxetine 10-30 mg for OCD and anxiety in [id number], switched to Citalopram 20 mg in [id number], discontinued d/t excessive sleep at the time and didn't feel helpful for mood.   Drinks a lot of coffee--2 cups and Red Bull, and then adderall, heart was racing.   Also reports rectal bleeding--bright red blood on toilet paper and in toilet. Was on her period last week but made sure it was coming from the rectum. Not sure if hemorrhoids. Small pebble bowel movement and yesterday, feeling bloated.     Menses:  No LMP recorded. (Menstrual status: IUD).  Menstrual cycles are irregular, gets cramps before, but unpredictable, sometimes coming every 2 weeks. Bleeding light/spotting.  No bleeding for the first 1.5 years, cramps were a lot worse before the IUD. Is tolerable, more annoying. Uses Midol, seems to help.   Cries before her period. Thinks cycle ended yesterday but not sure.     PAP HX:   Last   Lab Results   Component Value Date    PAP NIL 11/21/2019     History of abnormal None    Breast:  Patient performs self breast exams  No  Breast concerns No, sometimes tender around her period.  No results found.    Sexual Hx:  Sexually active  not at present  Partner(s) are  male   IS interested in STD testing    Pregnancy:   none      Medications and allergies reviewed by me today.     Family History   Problem Relation Age of " Onset     Asthma Mother      Obesity Father      Hypertension Maternal Grandmother      Cancer Maternal Grandfather      Hypertension Paternal Grandmother      Glaucoma Other         Mat Great Aunt/blindness     Glaucoma Maternal Aunt      Macular Degeneration No family hx of      Melanoma No family hx of      Skin Cancer No family hx of        Social History     Tobacco Use     Smoking status: Never Smoker     Smokeless tobacco: Never Used   Vaping Use     Vaping Use: Never used   Substance Use Topics     Alcohol use: Yes     Comment: Occasional with friends.     Drug use: Not Currently     Types: Marijuana     Comment: occas marijuana       Immunization History   Administered Date(s) Administered     COVID-19,PF,Pfizer (12+ Yrs) 03/01/2021, 03/22/2021     DTAP (<7y) 1997, 01/28/1998, 03/30/1998, 01/20/1999, 10/23/2002     FLU 6-35 months 12/04/2006, 10/10/2007, 10/30/2008, 10/29/2009, 12/01/2010, 10/18/2011, 10/17/2012     HEPA 10/30/2008, 06/01/2009     HPV 06/01/2009, 10/29/2009, 03/29/2010     HPV Quadrivalent 06/01/2009, 10/29/2009, 03/29/2010     HepA-ped 2 Dose 10/30/2008, 06/01/2009     HepB 1997, 1997, 06/29/1998     Hib (PRP-T) 1997, 01/28/1998, 03/30/1998, 01/20/1999     Influenza (H1N1) 01/19/2010     Influenza (IIV3) PF 10/14/1998, 11/18/1998, 12/04/2006, 10/10/2007, 10/30/2008, 10/29/2009, 12/01/2010, 10/18/2011, 10/17/2012     Influenza Vaccine IM > 6 months Valent IIV4 (Alfuria,Fluzone) 12/05/2013, 01/15/2015, 10/13/2015, 11/30/2016, 11/02/2017, 12/06/2018, 10/21/2019     MMR 11/18/1998, 10/23/2002     Mantoux Tuberculin Skin Test 02/27/2019     Meningococcal (Menactra ) 06/01/2009, 10/14/2014     Meningococcal (Menveo ) 10/14/2014     Pneumo Conj 13-V (2010&after) 09/06/2012     Poliovirus, inactivated (IPV) 1997, 01/28/1998, 09/29/1999, 10/23/2002     TD (ADULT, 7+) 02/27/2019     TDAP Vaccine (Boostrix) 06/01/2009     Tdap (Adacel,Boostrix) 06/01/2009     Varicella  "11/28/1998, 10/30/2008         Review Of Systems    Constitutional: no fevers, chills, night sweats or unintentional weight change   Eyes: no vision change, diplopia or red eyes   Ears, Nose, Mouth, Throat: no tinnitus or hearing change, no epistaxis or nasal discharge, no oral lesions, throat clear   Cardiovascular: no chest pain, palpitations, or pain with walking, no orthopnea or PND   Respiratory: no dyspnea, cough, shortness of breath or wheezing   GI: no nausea, vomiting, +constipation, abdominal bloating, see HPI  : no change in urine, no dysuria or hematuria, no sexual dysfunction   Musculoskeletal: no joint or muscle pain or swelling   Integumentary: no concerning lesions or moles   Neuro: no loss of strength or sensation, no numbness or tingling, no tremor, no dizziness, no headache    Psych: see HPI      OBJECTIVE:    BP 94/63   Pulse 81   Temp 98.3  F (36.8  C) (Oral)   Resp 16   Ht 1.626 m (5' 4\")   Wt 104.9 kg (231 lb 3.2 oz)   SpO2 97%   BMI 39.69 kg/m     Wt Readings from Last 1 Encounters:   03/30/22 104.9 kg (231 lb 3.2 oz)       Constitutional: no distress, comfortable, pleasant   Eyes: anicteric, normal extra-ocular movements   Ears, Nose and Throat: tympanic membranes clear, nose clear and free of lesions, throat clear, neck supple with full range of motion, no thyromegaly.   Cardiovascular: regular rate and rhythm, normal S1 and S2, no murmurs, rubs or gallops, peripheral pulses full and symmetric   Respiratory: clear to auscultation, no wheezes or crackles, normal breath sounds   Gastrointestinal: positive bowel sounds, non-distended, soft, mild generalized tenderness throughout, no rebound or guarding, no hepatosplenomegaly, no masses   Genitourinary: pelvic/pap deferred per pt, small external non-thrombosed hemorrhoid  Skin: no concerning lesions, no jaundice   Neurological: cranial nerves intact, normal strength and sensation, reflexes at patella and biceps normal, normal gait, no " tremor   Psychological: appropriate mood   Lymphatic: no cervical lymphadenopathy    ASSESSMENT/PLAN:  Pt is a 26 year old female here for preventive examination    1. Routine history and physical examination of adult  Encouraged work on healthy lifestyle with increased physical activity, nutritious diet with good portion control, focusing on lean proteins and vegetable/fruit intake, stress management and safety.    2. Anxiety  3. Psychophysiological insomnia  4. Moderate episode of recurrent major depressive disorder (H)  Encouraged establishing with a new therapist as her current one has not been a good fit. Start fluoxetine 10 mg daily, reviewed potential side effects and expect to take 8-12 weeks to see maximal benefit. If tolerating well over first 2 weeks will increase to 20 mg.  Advised cutting back on alcohol--if feels unable to limit herself to 1 drink, encouraged connecting with AA, which she has considered in the past.   - Adult Mental Health  Referral; Future  - FLUoxetine (PROZAC) 10 MG capsule; Take 1 capsule (10 mg) by mouth daily  Dispense: 30 capsule; Refill: 1    5. Attention deficit hyperactivity disorder (ADHD), predominantly inattentive type  - Adult Mental Health  Referral; Future    6. Slow transit constipation  7. External hemorrhoids  Reviewed conservative management (see AVS), keep stool soft, stay well-hydrated, increase fiber in diet, avoid straining or sitting for prolonged periods. If persists or worsening will refer to Colorectal clinic.  - docusate sodium (COLACE) 100 MG capsule; Take 1 capsule (100 mg) by mouth 2 times daily as needed for constipation  Dispense: 60 capsule; Refill: 1    FOLLOW UP: Return in about 3 months (around 6/30/2022) for using a video visit.    GYN TESTING:  Breast examination performed.No  Pelvic examination performed No   Pap   No --Due Nov 2022, declines today  STD testing No     PREVENTATIVE TESTING:  Immunizations reviewed and updated in  Epic--declines COVID booster, PPSV23 given today  Labs ordered None    57 minutes spent on the date of the encounter doing chart review, history and exam, documentation and further activities per the note    Adwoa Calabrese, APRN CNP

## 2022-03-31 ASSESSMENT — ANXIETY QUESTIONNAIRES: GAD7 TOTAL SCORE: 6

## 2022-04-19 ENCOUNTER — VIRTUAL VISIT (OUTPATIENT)
Dept: INTERNAL MEDICINE | Facility: CLINIC | Age: 27
End: 2022-04-19
Payer: COMMERCIAL

## 2022-04-19 DIAGNOSIS — R68.89 THROAT SYMPTOM: ICD-10-CM

## 2022-04-19 DIAGNOSIS — J45.20 MILD INTERMITTENT ASTHMA WITHOUT COMPLICATION: ICD-10-CM

## 2022-04-19 DIAGNOSIS — F90.0 ATTENTION DEFICIT HYPERACTIVITY DISORDER (ADHD), PREDOMINANTLY INATTENTIVE TYPE: ICD-10-CM

## 2022-04-19 DIAGNOSIS — F33.1 MODERATE EPISODE OF RECURRENT MAJOR DEPRESSIVE DISORDER (H): ICD-10-CM

## 2022-04-19 DIAGNOSIS — F41.9 ANXIETY: Primary | ICD-10-CM

## 2022-04-19 PROCEDURE — 99214 OFFICE O/P EST MOD 30 MIN: CPT | Mod: GT | Performed by: NURSE PRACTITIONER

## 2022-04-19 RX ORDER — FLUOXETINE 10 MG/1
10 CAPSULE ORAL DAILY
Qty: 90 CAPSULE | Refills: 1 | Status: SHIPPED | OUTPATIENT
Start: 2022-04-19 | End: 2022-12-21

## 2022-04-19 RX ORDER — DEXTROAMPHETAMINE SACCHARATE, AMPHETAMINE ASPARTATE MONOHYDRATE, DEXTROAMPHETAMINE SULFATE AND AMPHETAMINE SULFATE 2.5; 2.5; 2.5; 2.5 MG/1; MG/1; MG/1; MG/1
10 CAPSULE, EXTENDED RELEASE ORAL DAILY
Qty: 30 CAPSULE | Refills: 0 | Status: SHIPPED | OUTPATIENT
Start: 2022-06-13 | End: 2022-08-02

## 2022-04-19 RX ORDER — DEXTROAMPHETAMINE SACCHARATE, AMPHETAMINE ASPARTATE MONOHYDRATE, DEXTROAMPHETAMINE SULFATE AND AMPHETAMINE SULFATE 2.5; 2.5; 2.5; 2.5 MG/1; MG/1; MG/1; MG/1
10 CAPSULE, EXTENDED RELEASE ORAL DAILY
Qty: 30 CAPSULE | Refills: 0 | Status: SHIPPED | OUTPATIENT
Start: 2022-07-13 | End: 2022-08-02

## 2022-04-19 RX ORDER — ALBUTEROL SULFATE 90 UG/1
2 AEROSOL, METERED RESPIRATORY (INHALATION) EVERY 6 HOURS PRN
Qty: 8.5 G | Refills: 2 | Status: SHIPPED | OUTPATIENT
Start: 2022-04-19

## 2022-04-19 ASSESSMENT — ANXIETY QUESTIONNAIRES
3. WORRYING TOO MUCH ABOUT DIFFERENT THINGS: NOT AT ALL
5. BEING SO RESTLESS THAT IT IS HARD TO SIT STILL: NOT AT ALL
7. FEELING AFRAID AS IF SOMETHING AWFUL MIGHT HAPPEN: NOT AT ALL
GAD7 TOTAL SCORE: 1
2. NOT BEING ABLE TO STOP OR CONTROL WORRYING: NOT AT ALL
6. BECOMING EASILY ANNOYED OR IRRITABLE: NOT AT ALL
1. FEELING NERVOUS, ANXIOUS, OR ON EDGE: NOT AT ALL
IF YOU CHECKED OFF ANY PROBLEMS ON THIS QUESTIONNAIRE, HOW DIFFICULT HAVE THESE PROBLEMS MADE IT FOR YOU TO DO YOUR WORK, TAKE CARE OF THINGS AT HOME, OR GET ALONG WITH OTHER PEOPLE: NOT DIFFICULT AT ALL

## 2022-04-19 ASSESSMENT — PATIENT HEALTH QUESTIONNAIRE - PHQ9
5. POOR APPETITE OR OVEREATING: SEVERAL DAYS
SUM OF ALL RESPONSES TO PHQ QUESTIONS 1-9: 1

## 2022-04-19 NOTE — PATIENT INSTRUCTIONS
Thank you for visiting the Primary Care Center today at the [facility]! The following is some information about our clinic:     Primary Care Center Frequently-Asked Questions    (1) How do I schedule appointments at the [facility]?     Primary Care--to schedule or make changes to an existing appointment, please call our primary care line at [phone].    Labs--to schedule a lab appointment at the [facility] you can use [employer] or call [phone]. If you have a [place] location that is closer to home, you can reach out to that location for scheduling options.     Imaging--if you need to schedule a CT, X-ray, MRI, ultrasound, or other imaging study you can call [phone] to schedule at the [facility] or any other [facility] imaging location.     Referrals--if a referral to another specialty was ordered you can expect a phone call from their scheduling team. If you have not heard from them in a week, please call us or send us a [employer] message to check the status or get a scheduling number. Please note that this only applies to internal [facility] referrals. If the referral is external you would need to contact their office for scheduling.     (2) I have a question about my visit, who do I contact?     You can call us at the primary care line at [phone] to ask questions about your visit. You can also send a secure message through [employer], which is reviewed by clinic staff. Please note that [employer] messages have a twenty-four to forty-eight business hour turnaround time and should not be used for urgent concerns.    (3) How will I get the results of my tests?    If you are signed up for [employer] all tests will be released to you within twenty-four hours of resulting. Please allow three to five days for your doctor to review your results and place a note interpreting the results. If you do not have [employer] you will receive your  results through mail seven to ten business days following the return of the tests. Please note that if there should be any urgent or concerning results that your doctor or their registered nurse will reach out to you the same day as the tests come back. If you have follow up questions about your results or would like to discuss the results in detail please schedule a follow up with your provider either in person or virtually.     (4) How do I get refills of my prescriptions?     You should always first contact your pharmacy for refills of your medications. If submitting a refill request on [employer], please be sure to submit the request only once--repeat requests can cause delays in refill. If you are requesting a NEW medication or a medication related to new symptoms you will need to schedule an appointment with a provider prior to approval. Please note: Routine medication refills have up to one to three business day turnaround whereas controlled substances refills have up to five to seven business day turnaround.    (5) I have new symptoms, what do I do?     If you are having an immediate medical emergency, you should dial 911 for assistance.   For anything urgent that needs to be seen within a few hours to one day you should visit a local urgent care for assistance.  For non-urgent symptoms that need to be seen within a few days to a week you can schedule with an available provider in primary care by going to [employer] or calling [phone].   If you are not sure how serious your symptoms are or you would like to receive medical advice you can always call [phone] to speak with a triage nurse.

## 2022-04-19 NOTE — PROGRESS NOTES
Juanita is a 26 year old who is being evaluated via a billable video visit.      How would you like to obtain your AVS? MyChart  If the video visit is dropped, the invitation should be resent by: Send to e-mail at: [email or web address]  Will anyone else be joining your video visit? No      Video Start Time: 2:06 PM    Assessment & Plan     Anxiety  Moderate episode of recurrent major depressive disorder (H)  Feels mood improving since starting Fluoxetine 10 mg daily.  Denies side effects.  Encouraged to continue with self-care and stress management, working on regular exercise and sleep routine. Will continue with current regimen, refills provided.   - FLUoxetine (PROZAC) 10 MG capsule; Take 1 capsule (10 mg) by mouth daily    Attention deficit hyperactivity disorder (ADHD), predominantly inattentive type  Refill provided.  - amphetamine-dextroamphetamine (ADDERALL XR) 10 MG 24 hr capsule; Take 1 capsule (10 mg) by mouth daily  - amphetamine-dextroamphetamine (ADDERALL XR) 10 MG 24 hr capsule; Take 1 capsule (10 mg) by mouth daily    Mild intermittent asthma without complication  Uses infrequently, will provide refill to have available at the pharmacy when needed.  - albuterol (PROAIR HFA/PROVENTIL HFA/VENTOLIN HFA) 108 (90 Base) MCG/ACT inhaler; Inhale 2 puffs into the lungs every 6 hours as needed for shortness of breath / dyspnea or wheezing    Throat symptom  Describes a white area in back of throat--I did not see this on her physical exam last month, though sounds like tonsillar stone. Reviewed good oral hygiene, gargling with salt water or Listerine. Follow-up in clinic if worsening (worsening difficulty swallowing, throat pain, etc.).      MDM 2+ problem visit, prescription drug management    Return in about 7 months (around 11/19/2022) for in person for pap/pelvic exam.    [facility] INTERNAL MEDICINE [facility]   Juanita is a 26 year old who  presents for the following health issues     HPI       Mood follow-up--Feeling okay over the last few weeks. Started Fluoxetine on 3/30/22; feels happy, feels like mood has improved. No SEs, denies worsening mood or SI. Sleep seems more consistent, not quite as tired. Never had a good sleeping schedule, brain was going fast/mind all over the place and exhausting in the past.  No additional hemorrhoid bleeding. Has tried stool softener, more now as needed, eating more fruits and vegetables, smoothies.  Throat symptom--Feels like something stuck in the back of the throat, on the L side of the uvula, looked in the back of the throat and saw a white-yellowish dot. Has been there for a while. Called triage line, recommended salt water gargles. Can cough after eating, has to drink a lot of fluids. Not painful, more just annoying. Wonders if anything else she can do to treat.    Review of Systems   Constitutional, HEENT, cardiovascular, pulmonary, gi and gu systems are negative, except as otherwise noted.      Objective    Vitals - Patient Reported  Weight (Patient Reported): 100.7 kg (222 lb)  Pain Score: No Pain (0)      Vitals:  No vitals were obtained today due to virtual visit.    Physical Exam   GENERAL: Healthy, alert and no distress  EYES: Eyes grossly normal to inspection.  No discharge or erythema, or obvious scleral/conjunctival abnormalities.  RESP: No audible wheeze, cough, or visible cyanosis.  No visible retractions or increased work of breathing.    SKIN: Visible skin clear. No significant rash, abnormal pigmentation or lesions.  NEURO: Cranial nerves grossly intact.  Mentation and speech appropriate for age.  PSYCH: Mentation appears normal, affect normal/bright, judgement and insight intact, normal speech and appearance well-groomed.                Video-Visit Details    Type of service:  Video Visit    Video End Time:2:21 PM    Originating Location (pt. Location): Home    Distant Location (provider  location):  [facility] INTERNAL MEDICINE [place]     Platform used for Video Visit: [employer]

## 2022-04-19 NOTE — PROGRESS NOTES
"Juanita is a 26 year old who is being evaluated via a billable video visit.      How would you like to obtain your AVS? MyChart  If the video visit is dropped, the invitation should be resent by: Text to cell phone: [phone]  Will anyone else be joining your video visit? No  {If patient encounters technical issues they should call [phone] :[id number]}    Video Start Time: {video visit start/end time for provider to select:152948}  Video-Visit Details    Type of service:  Video Visit    Video End Time:{video visit start/end time for provider to select:152948}    Originating Location (pt. Location): {video visit patient location:[id number]::\"Home\"}    Distant Location (provider location):  [facility] INTERNAL MEDICINE [place]     Platform used for Video Visit: {Virtual Visit Platforms:912466::\"[employer]\"}  "

## 2022-04-20 ASSESSMENT — ANXIETY QUESTIONNAIRES: GAD7 TOTAL SCORE: 1

## 2022-04-21 ENCOUNTER — TELEPHONE (OUTPATIENT)
Dept: INTERNAL MEDICINE | Facility: CLINIC | Age: 27
End: 2022-04-21
Payer: COMMERCIAL

## 2022-05-11 ENCOUNTER — TELEPHONE (OUTPATIENT)
Dept: INTERNAL MEDICINE | Facility: CLINIC | Age: 27
End: 2022-05-11
Payer: COMMERCIAL

## 2022-05-11 NOTE — TELEPHONE ENCOUNTER
[facility] Call Center    Phone Message    May a detailed message be left on voicemail: yes     Reason for Call: Other: Per call from PT has a follow up question from last visit and is requesting a call back to discuss ASAP. No further information given.      Action Taken: Message routed to:  [facility]    Travel Screening: Not Applicable

## 2022-05-12 NOTE — TELEPHONE ENCOUNTER
Spoke to patient who had some questions about her medication and needing some advise from PCP.     Patient is unable to talk at the moment, as she is at work. Pt was going to send in a [employer] message to try to ask her question. However if message does not make sense, pt asked that we call her back, or that she will call the clinic back if she does not hear from us.     Informed the patient that PCP was out of clinic on vacation this week, so mychart message would be best option. Flaca Hummel LPN 5/12/2022 10:09 AM

## 2022-06-28 ENCOUNTER — NURSE TRIAGE (OUTPATIENT)
Dept: NURSING | Facility: CLINIC | Age: 27
End: 2022-06-28

## 2022-06-28 NOTE — TELEPHONE ENCOUNTER
Nurse Triage SBAR    Is this a 2nd Level Triage?   No    Office visit made for Pt care.     Situation:  Earache/cold symptoms    Background/Assessment:    Pt reporting, just feeling off and not herself.  Reporting, the last few days with a dry and little sore throat.  Sinus drainage and a ear ache in the left ear.   No fever, hot sweats, cold sweats or the chills.   But feels a little congested.      The left ear feels like it is full.  Like something is stuff in the ear.    Pt can her popping when she swallows.   No ringing in the left ear.   Just popping sound.   Some ear pain in the left ear.    Pt was tested for Covid and it was NEGATIVE.  Pt took a home test and a lab test.  Both were negative.    Pt has a visit for tomorrow morning.  Pt wanting to know what she can take to relieve her symptoms for Pt care.  I suggested Tylenol or Advil for fever or pain.   Benadryl, Claritin or sudafed for sinus drainage and congestion.       Pt ask if she could take Theraflu night time medication.   I mentioned she could.  But do not take all the other things I mentioned.  Because Theraflu has Tylenol and antihistamine in the  Medication already.      So she does not want to double up on medication.       Pt agreed with plan of care.   Mentioned she would try Tea with honey first.  If that did not work.   She would take some Thera flu instead for her symptoms.   Until she see the provider tomorrow for Pt care.    No further action.   Pt has office visit on 6/29/2022 in the morning for Pt care.    Elenita Soria RN  Central Triage Red Flags/Med Refills        Protocol Recommended Disposition:   See Today Or Tomorrow In Office    Reason for Disposition    Patient wants to be seen    Additional Information    Negative: Sounds like a life-threatening emergency to the triager    Negative: Moving the earlobe or touching the ear clearly increases the pain    Negative: Pink or red swelling behind the ear    Negative: Stiff neck  (can't touch chin to chest)    Negative: Patient sounds very sick or weak to the triager    Negative: Severe earache pain    Negative: Fever > 103 F (39.4 C)    Negative: Pointed object was inserted into the ear canal (e.g., a pencil, stick, or wire)    Negative: White, yellow, or green discharge    Negative: Diabetes mellitus or a weak immune system (e.g., HIV positive, cancer chemotherapy, transplant patient)    Negative: Bloody discharge or unexplained bleeding from ear canal    Negative: New blurred vision or vision changes    Negative: All other earaches (Exceptions: earache lasting < 1 hour, and earache from air travel)    Protocols used: EARACHE-A-OH

## 2022-06-29 ENCOUNTER — APPOINTMENT (OUTPATIENT)
Dept: CT IMAGING | Facility: CLINIC | Age: 27
End: 2022-06-29
Attending: EMERGENCY MEDICINE
Payer: COMMERCIAL

## 2022-06-29 ENCOUNTER — HOSPITAL ENCOUNTER (EMERGENCY)
Facility: CLINIC | Age: 27
Discharge: HOME | End: 2022-06-29
Attending: EMERGENCY MEDICINE | Admitting: EMERGENCY MEDICINE
Payer: COMMERCIAL

## 2022-06-29 VITALS
WEIGHT: 221 LBS | RESPIRATION RATE: 16 BRPM | OXYGEN SATURATION: 100 % | DIASTOLIC BLOOD PRESSURE: 91 MMHG | TEMPERATURE: 98 F | HEIGHT: 64 IN | BODY MASS INDEX: 37.73 KG/M2 | HEART RATE: 91 BPM | SYSTOLIC BLOOD PRESSURE: 137 MMHG

## 2022-06-29 DIAGNOSIS — S02.5XXA CLOSED AVULSION FRACTURE OF TOOTH, INITIAL ENCOUNTER: ICD-10-CM

## 2022-06-29 DIAGNOSIS — S06.0X0A CONCUSSION WITHOUT LOSS OF CONSCIOUSNESS, INITIAL ENCOUNTER: ICD-10-CM

## 2022-06-29 DIAGNOSIS — S01.511A LIP LACERATION, INITIAL ENCOUNTER: ICD-10-CM

## 2022-06-29 PROCEDURE — 70450 CT HEAD/BRAIN W/O DYE: CPT

## 2022-06-29 PROCEDURE — 70450 CT HEAD/BRAIN W/O DYE: CPT | Mod: 26 | Performed by: RADIOLOGY

## 2022-06-29 PROCEDURE — 99284 EMERGENCY DEPT VISIT MOD MDM: CPT | Performed by: EMERGENCY MEDICINE

## 2022-06-29 PROCEDURE — 70486 CT MAXILLOFACIAL W/O DYE: CPT | Mod: 26 | Performed by: RADIOLOGY

## 2022-06-29 PROCEDURE — 70486 CT MAXILLOFACIAL W/O DYE: CPT

## 2022-06-29 PROCEDURE — 99284 EMERGENCY DEPT VISIT MOD MDM: CPT | Mod: 25

## 2022-06-29 RX ORDER — CHLORHEXIDINE GLUCONATE ORAL RINSE 1.2 MG/ML
15 SOLUTION DENTAL 4 TIMES DAILY
Qty: 473 ML | Refills: 0 | Status: SHIPPED | OUTPATIENT
Start: 2022-06-29 | End: 2023-01-17

## 2022-06-29 RX ORDER — LIDOCAINE HYDROCHLORIDE AND EPINEPHRINE 10; 10 MG/ML; UG/ML
INJECTION, SOLUTION INFILTRATION; PERINEURAL
Status: DISCONTINUED
Start: 2022-06-29 | End: 2022-06-29 | Stop reason: HOSPADM

## 2022-06-29 RX ORDER — AMOXICILLIN 500 MG/1
500 CAPSULE ORAL 3 TIMES DAILY
Qty: 30 CAPSULE | Refills: 0 | Status: SHIPPED | OUTPATIENT
Start: 2022-06-29 | End: 2022-07-09

## 2022-06-29 ASSESSMENT — ENCOUNTER SYMPTOMS: WOUND: 1

## 2022-06-29 NOTE — LETTER
June 29, 2022      To Whom It May Concern:      Juanita Gonzalez was seen in our Emergency Department today, 06/29/22.  I expect her condition to improve over the next 1-2 days.  She may return to work/school when improved.    Sincerely,        Wilfredo Mccormick MD

## 2022-06-29 NOTE — DISCHARGE INSTRUCTIONS
Please make an appointment to follow up with a dentist and Your Primary Care Provider as soon as possible for reevaluation and for assessment of chipped tooth.    Amoxicillin and Peridex rinse as directed.    Avoid any activities that could result in another head injury until you are symptom-free for at least 1 week.    Tylenol and or ibuprofen for pain.    Return to the emergency department for any problems.

## 2022-06-29 NOTE — ED NOTES
Bed: [facility]  Expected date:   Expected time:   Means of arrival:   Comments:  Avail - stretcher

## 2022-06-29 NOTE — PROCEDURES
Narrative: Procedure: Laceration Repair        LACERATION:  Three ,1cm oral lip lacerations with retained tooth in upper lip visualized on CT scan. <1cm laceration on upper outer lip, 2 x <1cm lacerations on inner surface of upper lip, left near tooth 7 and 8.       LOCATION:  Upper lip      FUNCTION:  Distally sensation and circulation are intact.      ANESTHESIA:  Local using lidocaine 1% with epinepherine total of 1 mLs      PREPARATION:  Irrigation with Normal Saline      DEBRIDEMENT:  wound explored and 2 pinpoint sized tooth fragments removed      CLOSURE:  Wound was not closed, bacitracin applied out outer lip laceration, inner lip lacerations left open
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
## 2022-08-02 ENCOUNTER — VIRTUAL VISIT (OUTPATIENT)
Dept: INTERNAL MEDICINE | Facility: CLINIC | Age: 27
End: 2022-08-02
Payer: COMMERCIAL

## 2022-08-02 DIAGNOSIS — F41.9 ANXIETY: ICD-10-CM

## 2022-08-02 DIAGNOSIS — F33.1 MODERATE EPISODE OF RECURRENT MAJOR DEPRESSIVE DISORDER (H): ICD-10-CM

## 2022-08-02 DIAGNOSIS — F33.8 SEASONAL AFFECTIVE DISORDER (H): Primary | ICD-10-CM

## 2022-08-02 DIAGNOSIS — F90.1 ATTENTION DEFICIT HYPERACTIVITY DISORDER (ADHD), PREDOMINANTLY HYPERACTIVE TYPE: ICD-10-CM

## 2022-08-02 PROCEDURE — 99214 OFFICE O/P EST MOD 30 MIN: CPT | Mod: GT | Performed by: NURSE PRACTITIONER

## 2022-08-02 RX ORDER — DEXTROAMPHETAMINE SACCHARATE, AMPHETAMINE ASPARTATE MONOHYDRATE, DEXTROAMPHETAMINE SULFATE AND AMPHETAMINE SULFATE 2.5; 2.5; 2.5; 2.5 MG/1; MG/1; MG/1; MG/1
10 CAPSULE, EXTENDED RELEASE ORAL DAILY
Qty: 30 CAPSULE | Refills: 0 | Status: SHIPPED | OUTPATIENT
Start: 2022-10-07 | End: 2022-11-06

## 2022-08-02 RX ORDER — DEXTROAMPHETAMINE SACCHARATE, AMPHETAMINE ASPARTATE MONOHYDRATE, DEXTROAMPHETAMINE SULFATE AND AMPHETAMINE SULFATE 2.5; 2.5; 2.5; 2.5 MG/1; MG/1; MG/1; MG/1
10 CAPSULE, EXTENDED RELEASE ORAL DAILY
Qty: 30 CAPSULE | Refills: 0 | Status: SHIPPED | OUTPATIENT
Start: 2022-08-12 | End: 2022-09-11

## 2022-08-02 RX ORDER — DEXTROAMPHETAMINE SACCHARATE, AMPHETAMINE ASPARTATE MONOHYDRATE, DEXTROAMPHETAMINE SULFATE AND AMPHETAMINE SULFATE 2.5; 2.5; 2.5; 2.5 MG/1; MG/1; MG/1; MG/1
10 CAPSULE, EXTENDED RELEASE ORAL DAILY
Qty: 30 CAPSULE | Refills: 0 | Status: SHIPPED | OUTPATIENT
Start: 2022-09-09 | End: 2022-10-09
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
## 2022-08-02 NOTE — PATIENT INSTRUCTIONS
"Seasonal Affective Disorder--Light Therapy   - 10,000 Lux strength light has been been shown to be effective.   - Use 15-30 minutes a day. Try to use first thing in the morning.   - Set light at or just slightly above eye-level, 10-12 inches from your face.      Tips for Managing ADHD:    1. Break down complex tasks into small steps. Stay focused by setting a timer for 30 minutes and work on keeping your concentration for that amount of time. Take a break as a reward after finishing the task. Allow transition time to reflect on the steps needed to complete the next complex task before shifting activities.    2. Make checklists and \"To Do\" lists, adding time estimates and level of prioritization for each task.    3. Write down instructions and key take-aways as needed at meetings (work, school, or family).    4. Keep a consistent daily/weekly routine--this can help with memorization and mastering tasks.     5. Keep a regular sleep/wake cycle, aim for 7-9 hours of sleep. This helps improve memory functioning, mood, and maximizes mental and physical stamina.    6. Exercise daily. Research shows that adults can maintain cognitive skills with regular physical activity; exercise improves executive function and mood.    7. Consider individual therapy to provide coaching for ADHD symptoms.    8. Helpful books to read: Delivered from Distraction and Answers to Distraction, both by Zachary Bermudez and Antony Mora--provide information and education on ADHD and ideas to help manage symptoms.     "

## 2022-08-03 ENCOUNTER — TELEPHONE (OUTPATIENT)
Dept: INTERNAL MEDICINE | Facility: CLINIC | Age: 27
End: 2022-08-03

## 2022-10-22 ENCOUNTER — HEALTH MAINTENANCE LETTER (OUTPATIENT)
Age: 27
End: 2022-10-22

## 2022-11-22 DIAGNOSIS — F90.1 ATTENTION DEFICIT HYPERACTIVITY DISORDER (ADHD), PREDOMINANTLY HYPERACTIVE TYPE: Primary | ICD-10-CM

## 2022-11-22 RX ORDER — DEXTROAMPHETAMINE SACCHARATE, AMPHETAMINE ASPARTATE MONOHYDRATE, DEXTROAMPHETAMINE SULFATE AND AMPHETAMINE SULFATE 2.5; 2.5; 2.5; 2.5 MG/1; MG/1; MG/1; MG/1
10 CAPSULE, EXTENDED RELEASE ORAL DAILY
Qty: 30 CAPSULE | Refills: 0 | Status: SHIPPED | OUTPATIENT
Start: 2023-01-23 | End: 2023-02-22

## 2022-11-22 RX ORDER — DEXTROAMPHETAMINE SACCHARATE, AMPHETAMINE ASPARTATE MONOHYDRATE, DEXTROAMPHETAMINE SULFATE AND AMPHETAMINE SULFATE 2.5; 2.5; 2.5; 2.5 MG/1; MG/1; MG/1; MG/1
10 CAPSULE, EXTENDED RELEASE ORAL DAILY
Qty: 30 CAPSULE | Refills: 0 | Status: SHIPPED | OUTPATIENT
Start: 2022-11-22 | End: 2022-12-22

## 2022-11-22 RX ORDER — DEXTROAMPHETAMINE SACCHARATE, AMPHETAMINE ASPARTATE MONOHYDRATE, DEXTROAMPHETAMINE SULFATE AND AMPHETAMINE SULFATE 2.5; 2.5; 2.5; 2.5 MG/1; MG/1; MG/1; MG/1
10 CAPSULE, EXTENDED RELEASE ORAL DAILY
Qty: 30 CAPSULE | Refills: 0 | Status: SHIPPED | OUTPATIENT
Start: 2022-12-23 | End: 2023-01-17

## 2022-11-22 RX ORDER — DEXTROAMPHETAMINE SACCHARATE, AMPHETAMINE ASPARTATE MONOHYDRATE, DEXTROAMPHETAMINE SULFATE AND AMPHETAMINE SULFATE 2.5; 2.5; 2.5; 2.5 MG/1; MG/1; MG/1; MG/1
10 CAPSULE, EXTENDED RELEASE ORAL DAILY
Qty: 30 CAPSULE | Refills: 0 | Status: CANCELLED | OUTPATIENT
Start: 2022-11-22

## 2022-11-22 NOTE — TELEPHONE ENCOUNTER
Medication last filled on 10/22/22, Qty 30, 0 refills.  Per  data.      Valerie Hannon RN on 11/22/2022 at 2:21 PM

## 2022-11-22 NOTE — TELEPHONE ENCOUNTER
[facility] Call Center    Phone Message    May a detailed message be left on voicemail: yes     Reason for Call: Medication Refill Request    Has the patient contacted the pharmacy for the refill? Yes   Name of medication being requested: Adderall 10mg  Provider who prescribed the medication: Adwoa Calabrese APRN CNP  Pharmacy: [phone]  Date medication is needed: asap   The patient has 3 days left of her current prescription just FYI thank you/      Action Taken: Message routed to:  [facility] pcc    Travel Screening: Not Applicable

## 2022-12-19 DIAGNOSIS — F41.9 ANXIETY: ICD-10-CM

## 2022-12-19 DIAGNOSIS — F33.1 MODERATE EPISODE OF RECURRENT MAJOR DEPRESSIVE DISORDER (H): ICD-10-CM

## 2022-12-21 RX ORDER — FLUOXETINE 10 MG/1
10 CAPSULE ORAL DAILY
Qty: 90 CAPSULE | Refills: 0 | Status: SHIPPED | OUTPATIENT
Start: 2022-12-21 | End: 2023-01-17

## 2022-12-21 NOTE — TELEPHONE ENCOUNTER
FLUOXETINE HCL 10MG CAPS      Last Written Prescription Date:  4/19/22  Last Fill Quantity: 90,   # refills: 1  Last Office Visit : 8/2/22  Future Office visit:  none    Routing refill request to provider for review/approval because:  Overdue for PHQ-9      90d grf sent

## 2023-01-17 ENCOUNTER — VIRTUAL VISIT (OUTPATIENT)
Dept: INTERNAL MEDICINE | Facility: CLINIC | Age: 28
End: 2023-01-17
Payer: COMMERCIAL

## 2023-01-17 DIAGNOSIS — F33.1 MODERATE EPISODE OF RECURRENT MAJOR DEPRESSIVE DISORDER (H): ICD-10-CM

## 2023-01-17 DIAGNOSIS — F90.1 ATTENTION DEFICIT HYPERACTIVITY DISORDER (ADHD), PREDOMINANTLY HYPERACTIVE TYPE: ICD-10-CM

## 2023-01-17 DIAGNOSIS — F41.9 ANXIETY: ICD-10-CM

## 2023-01-17 DIAGNOSIS — L70.0 ACNE VULGARIS: ICD-10-CM

## 2023-01-17 PROCEDURE — 99214 OFFICE O/P EST MOD 30 MIN: CPT | Mod: GT | Performed by: NURSE PRACTITIONER

## 2023-01-17 RX ORDER — FLUOXETINE 10 MG/1
10 CAPSULE ORAL DAILY
Qty: 90 CAPSULE | Refills: 2 | Status: SHIPPED | OUTPATIENT
Start: 2023-01-17 | End: 2023-07-19

## 2023-01-17 RX ORDER — SPIRONOLACTONE 25 MG/1
TABLET ORAL
Qty: 180 TABLET | Refills: 2 | Status: SHIPPED | OUTPATIENT
Start: 2023-01-17 | End: 2023-07-19

## 2023-01-17 RX ORDER — DEXTROAMPHETAMINE SACCHARATE, AMPHETAMINE ASPARTATE MONOHYDRATE, DEXTROAMPHETAMINE SULFATE AND AMPHETAMINE SULFATE 2.5; 2.5; 2.5; 2.5 MG/1; MG/1; MG/1; MG/1
10 CAPSULE, EXTENDED RELEASE ORAL DAILY
Qty: 30 CAPSULE | Refills: 0 | Status: SHIPPED | OUTPATIENT
Start: 2023-03-22 | End: 2023-07-19

## 2023-01-17 RX ORDER — DEXTROAMPHETAMINE SACCHARATE, AMPHETAMINE ASPARTATE MONOHYDRATE, DEXTROAMPHETAMINE SULFATE AND AMPHETAMINE SULFATE 2.5; 2.5; 2.5; 2.5 MG/1; MG/1; MG/1; MG/1
10 CAPSULE, EXTENDED RELEASE ORAL DAILY
Qty: 30 CAPSULE | Refills: 0 | Status: SHIPPED | OUTPATIENT
Start: 2023-02-22 | End: 2023-07-19

## 2023-01-17 NOTE — PATIENT INSTRUCTIONS
Thank you for visiting the Primary Care Center today at the [facility]! The following is some information about our clinic:     Primary Care Center Frequently-Asked Questions    (1) How do I schedule appointments at the [facility]?     Primary Care--to schedule or make changes to an existing appointment, please call our primary care line at [phone].    Labs--to schedule a lab appointment at the [facility] you can use [employer] or call [phone]. If you have a [place] location that is closer to home, you can reach out to that location for scheduling options.     Imaging--if you need to schedule a CT, X-ray, MRI, ultrasound, or other imaging study you can call [phone] to schedule at the [facility] or any other [facility] imaging location.     Referrals--if a referral to another specialty was ordered you can expect a phone call from their scheduling team. If you have not heard from them in a week, please call us or send us a [employer] message to check the status or get a scheduling number. Please note that this only applies to internal [facility] referrals. If the referral is external you would need to contact their office for scheduling.     (2) I have a question about my visit, who do I contact?     You can call us at the primary care line at [phone] to ask questions about your visit. You can also send a secure message through [employer], which is reviewed by clinic staff. Please note that [employer] messages have a twenty-four to forty-eight business hour turnaround time and should not be used for urgent concerns.    (3) How will I get the results of my tests?    If you are signed up for [employer] all tests will be released to you within twenty-four hours of resulting. Please allow three to five days for your doctor to review your results and place a note interpreting the results. If you do not have [employer] you will receive your  results through mail seven to ten business days following the return of the tests. Please note that if there should be any urgent or concerning results that your doctor or their registered nurse will reach out to you the same day as the tests come back. If you have follow up questions about your results or would like to discuss the results in detail please schedule a follow up with your provider either in person or virtually.     (4) How do I get refills of my prescriptions?     You should always first contact your pharmacy for refills of your medications. If submitting a refill request on [employer], please be sure to submit the request only once--repeat requests can cause delays in refill. If you are requesting a NEW medication or a medication related to new symptoms you will need to schedule an appointment with a provider prior to approval. Please note: Routine medication refills have up to one to three business day turnaround whereas controlled substances refills have up to five to seven business day turnaround.    (5) I have new symptoms, what do I do?     If you are having an immediate medical emergency, you should dial 911 for assistance.   For anything urgent that needs to be seen within a few hours to one day you should visit a local urgent care for assistance.  For non-urgent symptoms that need to be seen within a few days to a week you can schedule with an available provider in primary care by going to [employer] or calling [phone].   If you are not sure how serious your symptoms are or you would like to receive medical advice you can always call [phone] to speak with a triage nurse.

## 2023-01-17 NOTE — PROGRESS NOTES
Juanita is a 27 year old who is being evaluated via a billable video visit.      How would you like to obtain your AVS? MyChart  If the video visit is dropped, the invitation should be resent by: Text to cell phone: [phone]  Will anyone else be joining your video visit? Dionne Vela VF      Assessment & Plan     Anxiety  Moderate episode of recurrent major depressive disorder (H)  Refill provided for fluoxetine, feels current dose is helping manage her mood well. Regarding sleep, encouraged good sleep hygiene with avoiding screens in 1-2 hours prior to bedtime, relaxing activities in the evenings, increase physical activity during the day etc. She agrees with the plan.  - FLUoxetine (PROZAC) 10 MG capsule; Take 1 capsule (10 mg) by mouth daily    Acne vulgaris  Can restart spironolactone for acne. Continue with skin hygiene, will hold off on topicals for now as these were previously too irritating for her skin.   - spironolactone (ALDACTONE) 25 MG tablet; Take 1 tablet (25 mg) by mouth daily for 14 days, THEN 1 tablet (25 mg) 2 times daily for 30 days.    Attention deficit hyperactivity disorder (ADHD), predominantly hyperactive type  Encouraged taking Adderall earlier in the day to help prevent interference with sleep at bedtime. Okay to hold doses on weekends.  - amphetamine-dextroamphetamine (ADDERALL XR) 10 MG 24 hr capsule; Take 1 capsule (10 mg) by mouth daily  - amphetamine-dextroamphetamine (ADDERALL XR) 10 MG 24 hr capsule; Take 1 capsule (10 mg) by mouth daily    MDM: 2+ problem visit, prescription drug management      Return in about 6 months (around 7/17/2023).    PINKY Berger [facility] INTERNAL MEDICINE [facility]   Juanita is a 27 year old, presenting for the following health issues:  No chief complaint on file.      HPI     Just finished school in December, associates of science in AP aesthetics. Unsure what to do next. Not  "currently working. Had been taking 24 credits.   Feels mood is doing well. Sleep impacted, thinks just related to current situation. Feels her \"normal\" self with current doses of Fluoxetine and Adderall. Some days gets up later.   Had been seeing dermatology a couple years ago, no longer using any of the medications for her acne. Spironolactone was making her dizzy so reduced the dose, but did help with her skin. Topicals dried out the oils but were irritating. Now using a PCA product from school for face wash/face mask, feels better able to take care of her skin.   Living with boyfriend currently, just returned from [place].     Review of Systems   Constitutional, HEENT, cardiovascular, pulmonary, gi and gu systems are negative, except as otherwise noted.      Objective    Vitals - Patient Reported  Height (Patient Reported): 162.6 cm (5' 4\")  Pain Score: No Pain (0)        Physical Exam   GENERAL: Healthy, alert and no distress  EYES: Eyes grossly normal to inspection.  No discharge or erythema, or obvious scleral/conjunctival abnormalities.  RESP: No audible wheeze, cough, or visible cyanosis.  No visible retractions or increased work of breathing.    SKIN: Visible skin clear. No significant rash, abnormal pigmentation or lesions. Mild-moderate acne on face.  NEURO: Cranial nerves grossly intact.  Mentation and speech appropriate for age.  PSYCH: Mentation appears normal, affect normal/bright, judgement and insight intact, normal speech and appearance well-groomed.                Video-Visit Details    Type of service:  Video Visit   Video Start Time: 9:38 AM  Video End Time:9:56 AM    Originating Location (pt. Location): Home    Distant Location (provider location):  Off-site  Platform used for Video Visit: Mitali"

## 2023-01-19 DIAGNOSIS — K59.01 SLOW TRANSIT CONSTIPATION: ICD-10-CM

## 2023-01-24 RX ORDER — DOCUSATE SODIUM 100 MG/1
100 CAPSULE, LIQUID FILLED ORAL 2 TIMES DAILY PRN
Qty: 60 CAPSULE | Refills: 11 | Status: SHIPPED | OUTPATIENT
Start: 2023-01-24 | End: 2024-02-10

## 2023-06-01 ENCOUNTER — HEALTH MAINTENANCE LETTER (OUTPATIENT)
Age: 28
End: 2023-06-01

## 2023-07-19 ENCOUNTER — VIRTUAL VISIT (OUTPATIENT)
Dept: INTERNAL MEDICINE | Facility: CLINIC | Age: 28
End: 2023-07-19
Payer: COMMERCIAL

## 2023-07-19 DIAGNOSIS — F33.1 MODERATE EPISODE OF RECURRENT MAJOR DEPRESSIVE DISORDER (H): ICD-10-CM

## 2023-07-19 DIAGNOSIS — F41.9 ANXIETY: ICD-10-CM

## 2023-07-19 DIAGNOSIS — J30.2 SEASONAL ALLERGIC RHINITIS, UNSPECIFIED TRIGGER: Primary | ICD-10-CM

## 2023-07-19 DIAGNOSIS — L70.0 ACNE VULGARIS: ICD-10-CM

## 2023-07-19 DIAGNOSIS — F90.1 ATTENTION DEFICIT HYPERACTIVITY DISORDER (ADHD), PREDOMINANTLY HYPERACTIVE TYPE: ICD-10-CM

## 2023-07-19 PROCEDURE — 99213 OFFICE O/P EST LOW 20 MIN: CPT | Performed by: NURSE PRACTITIONER

## 2023-07-19 RX ORDER — DEXTROAMPHETAMINE SACCHARATE, AMPHETAMINE ASPARTATE MONOHYDRATE, DEXTROAMPHETAMINE SULFATE AND AMPHETAMINE SULFATE 2.5; 2.5; 2.5; 2.5 MG/1; MG/1; MG/1; MG/1
10 CAPSULE, EXTENDED RELEASE ORAL DAILY
Qty: 30 CAPSULE | Refills: 0 | Status: SHIPPED | OUTPATIENT
Start: 2023-09-19 | End: 2024-01-10

## 2023-07-19 RX ORDER — FLUTICASONE PROPIONATE 50 MCG
1 SPRAY, SUSPENSION (ML) NASAL DAILY
Qty: 9.9 ML | Refills: 1 | Status: SHIPPED | OUTPATIENT
Start: 2023-07-19

## 2023-07-19 RX ORDER — FLUOXETINE 10 MG/1
10 CAPSULE ORAL DAILY
Qty: 90 CAPSULE | Refills: 2 | Status: SHIPPED | OUTPATIENT
Start: 2023-07-19

## 2023-07-19 RX ORDER — DEXTROAMPHETAMINE SACCHARATE, AMPHETAMINE ASPARTATE MONOHYDRATE, DEXTROAMPHETAMINE SULFATE AND AMPHETAMINE SULFATE 2.5; 2.5; 2.5; 2.5 MG/1; MG/1; MG/1; MG/1
10 CAPSULE, EXTENDED RELEASE ORAL DAILY
Qty: 30 CAPSULE | Refills: 0 | Status: SHIPPED | OUTPATIENT
Start: 2023-07-19 | End: 2023-08-18

## 2023-07-19 RX ORDER — DEXTROAMPHETAMINE SACCHARATE, AMPHETAMINE ASPARTATE MONOHYDRATE, DEXTROAMPHETAMINE SULFATE AND AMPHETAMINE SULFATE 2.5; 2.5; 2.5; 2.5 MG/1; MG/1; MG/1; MG/1
10 CAPSULE, EXTENDED RELEASE ORAL DAILY
Qty: 30 CAPSULE | Refills: 0 | Status: SHIPPED | OUTPATIENT
Start: 2023-08-19 | End: 2023-09-18

## 2023-07-19 RX ORDER — SPIRONOLACTONE 25 MG/1
TABLET ORAL
Qty: 180 TABLET | Refills: 1 | Status: SHIPPED | OUTPATIENT
Start: 2023-07-19

## 2023-07-19 NOTE — NURSING NOTE
Is the patient currently in the state of [place]? YES    Visit mode:TELEPHONE    If the visit is dropped, the patient can be reconnected by: TELEPHONE VISIT: [phone]    Will anyone else be joining the visit? NO      How would you like to obtain your AVS? MyChart    Are changes needed to the allergy or medication list? YES: Duplicate Adderall marked for removal    Reason for visit: RECHECK    ABRAHAM GALVAN

## 2023-08-23 ENCOUNTER — TELEPHONE (OUTPATIENT)
Dept: INTERNAL MEDICINE | Facility: CLINIC | Age: 28
End: 2023-08-23
Payer: COMMERCIAL

## 2023-12-18 NOTE — TELEPHONE ENCOUNTER
Reason for call:  Patient reporting a symptom    Symptom or request: swollen forearm, stiff and tightness, bright red     Duration (how long have symptoms been present): 2 days    Have you been treated for this before? No    Additional comments: Patient will be flying to [place] from [place] at 6PM. Allergy to bug bites.     Phone Number patient can be reached at:  Home number on file [phone] (home)    Best Time:  ASAP    Can we leave a detailed message on this number:  YES    Call taken on 11/5/2019 at 3:05 PM by Birdie Olivo     WOUND CARE         SUPPLIES  Clean cotton swabs (Q-tips)  Vaseline (petroleum jelly)         FIRST 24 HOURS  If there is a dressing over the wound, leave it in place.  If bleeding saturates the dressing, apply firm pressure for 15-20 minutes.  If bleeding is still present, then apply pressure with an ice pack for 15-20 minutes.  If bleeding does not stop, contact our office.  You may take Tylenol, as directed on the bottle, to relieve any discomfort.         CLEAN WOUND DAILY AFTER THE FIRST 24 HOURS  Wash hands with soap and water.  Gently remove dressing.  Gently wash site with soap and water.  This can be done while showering.  DO NOT FORCE CRUST OFF.  Apply Vaseline (petroleum jelly)  Cover wound with a bandage  CONTINUE ABOVE WOUND CARE UNTIL SUTURES ARE REMOVED OR NON-SUTURED WOUND IS HEALED.         PLEASE AVOID  Hot tubs - swimming pools - bathtubs  Excessive stretching or exercising if sutures are present  Exposing uncovered wound to dust or dirt         SIGNS OF INFECTION  Redness extending away from the surgical site (small amount of redness around the site is normal)  Fever  Warmth at surgical site  Green or yellow discharge  Pain         PATHOLOGY RESULTS  The physician may states that he/she is sending a specimen to a lab for diagnosis.  We will contact you with the results by mail or telephone within 2 weeks.  If you have not been contacted by then, please call us.         [facility]  Dermatology Department  [phone]  Monday through Friday  7:00 - 5:00 PM

## 2024-01-10 DIAGNOSIS — F90.1 ATTENTION DEFICIT HYPERACTIVITY DISORDER (ADHD), PREDOMINANTLY HYPERACTIVE TYPE: ICD-10-CM

## 2024-01-10 RX ORDER — DEXTROAMPHETAMINE SACCHARATE, AMPHETAMINE ASPARTATE MONOHYDRATE, DEXTROAMPHETAMINE SULFATE AND AMPHETAMINE SULFATE 2.5; 2.5; 2.5; 2.5 MG/1; MG/1; MG/1; MG/1
10 CAPSULE, EXTENDED RELEASE ORAL DAILY
Qty: 30 CAPSULE | Refills: 0 | Status: SHIPPED | OUTPATIENT
Start: 2024-01-10

## 2024-01-10 NOTE — TELEPHONE ENCOUNTER
amphetamine-dextroamphetamine (ADDERALL XR) 10 MG 24 hr capsule   30 capsule 0 9/19/2023 10/19/2023       7/19/2023  [facility] Internal Medicine Adwoa Ambriz APRN CNP  Internal Medicine    Routed : controlled

## 2024-02-07 DIAGNOSIS — K59.01 SLOW TRANSIT CONSTIPATION: ICD-10-CM

## 2024-02-10 RX ORDER — DOCUSATE SODIUM 100 MG/1
CAPSULE, LIQUID FILLED ORAL
Qty: 60 CAPSULE | Refills: 5 | Status: SHIPPED | OUTPATIENT
Start: 2024-02-10

## 2024-03-24 NOTE — PROGRESS NOTES
Juanita is a 27 year old who is being evaluated via a billable telephone visit.      What phone number would you like to be contacted at? [phone]  How would you like to obtain your AVS? Adelehart    Distant Location (provider location):  On-site    Assessment & Plan     Seasonal allergic rhinitis, unspecified trigger  Can use Fluticasone nasal spray for seasonal allergies, reviewed instructions for use.  - fluticasone (FLONASE) 50 MCG/ACT nasal spray; Spray 1 spray into both nostrils daily    Attention deficit hyperactivity disorder (ADHD), predominantly hyperactive type  Refill provided; tolerating well.   - amphetamine-dextroamphetamine (ADDERALL XR) 10 MG 24 hr capsule; Take 1 capsule (10 mg) by mouth daily for 30 days  - amphetamine-dextroamphetamine (ADDERALL XR) 10 MG 24 hr capsule; Take 1 capsule (10 mg) by mouth daily for 30 days  - amphetamine-dextroamphetamine (ADDERALL XR) 10 MG 24 hr capsule; Take 1 capsule (10 mg) by mouth daily for 30 days    Anxiety  Moderate episode of recurrent major depressive disorder (H)  Feels mood is well-controlled on current regimen, refills provided for Fluoxetine 10 mg daily.  - FLUoxetine (PROZAC) 10 MG capsule; Take 1 capsule (10 mg) by mouth daily    Acne vulgaris  Tolerating spironolactone well on lower dose than previously. Recommend in-person follow-up for routine physical and BP check. She agrees with the plan.  - spironolactone (ALDACTONE) 25 MG tablet; Take 1 tablet (25 mg) by mouth daily for 14 days, then increase to 1 tablet (25 mg) twice a day.      20 minutes spent by me on the date of the encounter doing chart review, history and exam, documentation and further activities per the note        Return in about 3 months (around 10/19/2023) for Routine preventive., due for pap/pelvic    PINKY Berger [facility] INTERNAL MEDICINE [facility]   Juanita is a 27 year old, presenting for the following health  issues:  RECHECK    HPI     ADHD--hasn't taken Adderall in the last 2 weeks, impacts sleep, feels she wants to be awake/can't settle down. Sleep is bad anyway, though can fall asleep once she does go to bed. Otherwise was going well.   Graduated college in May, enjoying summer. 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 No longer working at [employer].     Mood--doing well on Fluoxetine 10 mg, not feeling down or depressed. Summer weather & sunshine help.    Asthma--needs albuterol more during colder weather, lungs get tight in the winter.   Bad allergies. Hot outside and cold inside, sneezing and itching nose/eyes. Hasn't tried anything OTC to treat.     Acne--hasn't been taking spironolactone, misplaced it. Had been tolerating it well when she was taking it.    Review of Systems   Constitutional, HEENT, cardiovascular, pulmonary, gi and gu systems are negative, except as otherwise noted.      Objective    Vitals - Patient Reported  Systolic (Patient Reported):  (Not today)  Weight (Patient Reported):  (Not recently)  Pain Score: No Pain (0)        Physical Exam   General: healthy, alert and no distress  PSYCH: Alert and oriented times 3; coherent speech, normal   rate and volume, able to articulate logical thoughts, able   to abstract reason, no tangential thoughts, no hallucinations   or delusions  Her affect is normal and pleasant  RESP: No cough, no audible wheezing, able to talk in full sentences  Remainder of exam unable to be completed due to telephone visits                Phone call duration: 12 minutes     present

## 2024-06-02 ENCOUNTER — HEALTH MAINTENANCE LETTER (OUTPATIENT)
Age: 29
End: 2024-06-02

## 2025-01-08 NOTE — TELEPHONE ENCOUNTER
Controlled Substance Refill Request for amphetamine-dextroamphetamine (ADDERALL XR) 20 MG per 24 hr capsule  Problem List Complete:  Yes    Last Written Prescription Date:  11/28/17  Last Fill Quantity: 30 capsule,   # refills: 0    [place] [facility] [place]06/[place]    Clinic visit frequency required: Q 1 month     Future Office visit:     Controlled substance agreement on file: No.     Processing:  Patient will  in clinic   checked in past 6 months?  No, route to RN    
Last visit 9-6-17:  ASSESSMENT/PLAN:         1. Attention deficit hyperactivity disorder (ADHD), predominantly inattentive type    2. Screening for STD (sexually transmitted disease)    3. Irregular menses    4. Stress    - refilled adderall XR 20 mg once daily for ADHD, inattentive type  - lifestyle modifications including improved sleep hygiene with blue light filter/night mode on phone, increased physical activity, breathing/counting techniques, journaling, and therapist  - screen for GC, vaginal swab given new sexual partner  - Ob/Gyn referral given irregular menses despite endorsed compliance with OCP concerning for HPA axis dysregulation eg PCOS, low likelihood for trauma or local pathologic process at this time based on lack of signs/symptoms     FOLLOW UP: in 4-6 week(s) if symptoms not improving     The patient was seen and discussed with Dr. Jose Luis Millan, the attending, who agrees with the plan as stated above.     Richar Santiago MD  IM/PEDS, PGY3  pager [phone]     Attending:  Patient seen, examined and discussed with resident.  Agree with above assessment and plan.  Spent over 50% of the visit in face-to face counseling.  Total visit time: 40 minutes.     Jose Luis Millan MD    Spoke with Dr. Millan.  Juanita needs clinic appt.  Number listed has a male that answers and states this is the wrong number.  Left message at home number for Juanita to call us for message.  Coleen Huggins RN      
Spoke with Nina. She will have Juanita call back when she is available.  Jia Middleton RN    
166.4

## 2025-06-14 ENCOUNTER — HEALTH MAINTENANCE LETTER (OUTPATIENT)
Age: 30
End: 2025-06-14